# Patient Record
Sex: FEMALE | Race: WHITE | NOT HISPANIC OR LATINO | Employment: FULL TIME | URBAN - METROPOLITAN AREA
[De-identification: names, ages, dates, MRNs, and addresses within clinical notes are randomized per-mention and may not be internally consistent; named-entity substitution may affect disease eponyms.]

---

## 2017-06-27 ENCOUNTER — HOSPITAL ENCOUNTER (OUTPATIENT)
Dept: RADIOLOGY | Facility: MEDICAL CENTER | Age: 47
End: 2017-06-27
Attending: NURSE PRACTITIONER
Payer: MEDICAID

## 2017-06-27 DIAGNOSIS — Z12.31 VISIT FOR SCREENING MAMMOGRAM: ICD-10-CM

## 2017-06-27 PROCEDURE — 77063 BREAST TOMOSYNTHESIS BI: CPT

## 2017-08-09 ENCOUNTER — OFFICE VISIT (OUTPATIENT)
Dept: MEDICAL GROUP | Facility: CLINIC | Age: 47
End: 2017-08-09
Payer: MEDICAID

## 2017-08-09 VITALS
RESPIRATION RATE: 16 BRPM | TEMPERATURE: 97.7 F | HEIGHT: 65 IN | OXYGEN SATURATION: 96 % | WEIGHT: 256 LBS | DIASTOLIC BLOOD PRESSURE: 78 MMHG | HEART RATE: 76 BPM | BODY MASS INDEX: 42.65 KG/M2 | SYSTOLIC BLOOD PRESSURE: 114 MMHG

## 2017-08-09 DIAGNOSIS — R53.83 LETHARGIC: ICD-10-CM

## 2017-08-09 DIAGNOSIS — G47.00 INSOMNIA, UNSPECIFIED TYPE: ICD-10-CM

## 2017-08-09 DIAGNOSIS — Z23 NEED FOR VACCINATION: ICD-10-CM

## 2017-08-09 DIAGNOSIS — Z13.6 SCREENING FOR CARDIOVASCULAR CONDITION: ICD-10-CM

## 2017-08-09 DIAGNOSIS — R19.7 DIARRHEA IN ADULT PATIENT: ICD-10-CM

## 2017-08-09 DIAGNOSIS — F41.9 ANXIETY: ICD-10-CM

## 2017-08-09 PROCEDURE — 90715 TDAP VACCINE 7 YRS/> IM: CPT | Performed by: PHYSICIAN ASSISTANT

## 2017-08-09 PROCEDURE — 90471 IMMUNIZATION ADMIN: CPT | Performed by: PHYSICIAN ASSISTANT

## 2017-08-09 PROCEDURE — 99213 OFFICE O/P EST LOW 20 MIN: CPT | Mod: 25 | Performed by: PHYSICIAN ASSISTANT

## 2017-08-09 RX ORDER — HYDROXYZINE HYDROCHLORIDE 25 MG/1
25 TABLET, FILM COATED ORAL 3 TIMES DAILY PRN
Qty: 90 TAB | Refills: 0 | Status: SHIPPED | OUTPATIENT
Start: 2017-08-09 | End: 2017-09-11 | Stop reason: SDUPTHER

## 2017-08-09 ASSESSMENT — PAIN SCALES - GENERAL: PAINLEVEL: NO PAIN

## 2017-08-09 NOTE — ASSESSMENT & PLAN NOTE
The pateint states that she hasn't slept well in a very long time. She sates that she has insomnia, often not falling asleep until 1am after laying for hours. She states that she frequently wakes up in the evening and some times ruffin not fall back asleep. She states her boyfriend would tell her if she snored but she doesn't think she snores. She drinks 3 cups of coffee every morning.

## 2017-08-09 NOTE — ASSESSMENT & PLAN NOTE
Since her gallbladder removal about 1 year ago, the patient states she has had intermittent diarrhea with no apparent particular food correlation. She feels it may be correlated to stress but has recently become avoidant of her normal daily activities because she is afraid she will need to use the restroom and it will be locked.

## 2017-08-09 NOTE — ASSESSMENT & PLAN NOTE
Patient is unsure why but she has been extremely lethargic as of late. She states that she will come home from work and fall directly asleep because she is so tired.

## 2017-08-09 NOTE — MR AVS SNAPSHOT
"Lori Mendes   2017 11:20 AM   Office Visit   MRN: [id number]    [facility]  [phone]    Description:  Female : 1970   Provider:  Renetta Lozoya PA-C           Reason for Visit     Diarrhea x 1 year - diet changes - nothing works    Immunizations Tdap      Allergies as of 2017     Allergen Noted Reactions    Morphine 2015         You were diagnosed with     Screening for cardiovascular condition   [430174]       Lethargic   [382880]       Insomnia, unspecified type   [8359378]       Diarrhea in adult patient   [846977]       Anxiety   [131256]       Need for vaccination   [469784]         Vital Signs     Blood Pressure Pulse Temperature Respirations Height Weight    114/78 mmHg 76 36.5 °C (97.7 °F) 16 1.651 m (5' 5\") 116.121 kg (256 lb)    Body Mass Index Oxygen Saturation Smoking Status             42.60 kg/m2 96% Never Smoker          Basic Information     Date Of Birth Sex Race Ethnicity Preferred Language    1970 Female White Non- English      Your appointments     Sep 20, 2017  9:00 AM   Established Patient with Renetta Lozoya PA-C   [facility] (--)    [address] [address] [phone]           You will be receiving a confirmation call a few days before your appointment from our automated call confirmation system.            Pablo 15, 2018 11:20 AM   Telemedicine Clinic New Patient with A Rotation, [facility], [facility] [place]   Centralized Scheduling (--)    [address]  [place] [id number] [phone]              Problem List              ICD-10-CM Priority Class Noted - Resolved    Anxiety disorder (Chronic) F41.9   2012 - Present    Vitamin D deficiency E55.9   2012 - Present    Lumbar radiculitis M54.16   2012 - Present    Obesity (BMI 30-39.9) E66.9   2013 - Present    Other screening mammogram Z12.31   " 3/20/2013 - Present    Seasonal allergies J30.2   3/20/2013 - Present    Epigastric abdominal pain R10.13   10/8/2014 - Present    Acute cholecystitis K81.0   10/9/2014 - Present    Liver enzyme elevation R74.8   10/10/2014 - Present    Depression F32.9   9/1/2015 - Present    Absolute anemia D64.9   1/8/2016 - Present    Left ear pain H92.02   9/30/2016 - Present    Insomnia G47.00   8/9/2017 - Present    Lethargic R53.83   8/9/2017 - Present    Diarrhea in adult patient R19.7   8/9/2017 - Present      Health Maintenance        Date Due Completion Dates    PAP SMEAR 9/27/2015 9/27/2012    IMM INFLUENZA (1) 9/1/2017 ---    MAMMOGRAM 6/27/2018 6/27/2017, 3/23/2016, 10/27/2014, 8/28/2013    IMM DTaP/Tdap/Td Vaccine (2 - Td) 8/9/2027 8/9/2017            Current Immunizations     Tdap Vaccine 8/9/2017      Below and/or attached are the medications your provider expects you to take. Review all of your home medications and newly ordered medications with your provider and/or pharmacist. Follow medication instructions as directed by your provider and/or pharmacist. Please keep your medication list with you and share with your provider. Update the information when medications are discontinued, doses are changed, or new medications (including over-the-counter products) are added; and carry medication information at all times in the event of emergency situations     Allergies:  MORPHINE - (reactions not documented)               Medications  Valid as of: August 09, 2017 -  3:07 PM    Generic Name Brand Name Tablet Size Instructions for use    Cholecalciferol (Cap) VITAMIN D3 5000 UNIT Take 1 Cap by mouth every day.        Citalopram Hydrobromide (Tab) CELEXA 40 MG Take 1 Tab by mouth every day.        DiphenhydrAMINE HCl   Take  by mouth.        Gabapentin (Cap) NEURONTIN 300 MG Take 2 Caps by mouth 2 Times a Day.        HydrOXYzine HCl (Tab) ATARAX 25 MG Take 1 Tab by mouth 3 times a day as needed for Anxiety.         Norgestimate-Eth Estradiol (Tab) ORTHO-CYCLEN 0.25-35 MG-MCG Take 1 Tab by mouth every day.        .                 Medicines prescribed today were sent to:     [facility] [place] - [address]    [address] [place] [address]    Phone: [phone] Fax: [phone]    Open 24 Hours?: No    [id number] - [place], [place] - [address]    [address] [place] [address]    Phone: [phone] Fax: [phone]    Open 24 Hours?: No      Medication refill instructions:       If your prescription bottle indicates you have medication refills left, it is not necessary to call your provider’s office. Please contact your pharmacy and they will refill your medication.    If your prescription bottle indicates you do not have any refills left, you may request refills at any time through one of the following ways: The online Urvew system (except Urgent Care), by calling your provider’s office, or by asking your pharmacy to contact your provider’s office with a refill request. Medication refills are processed only during regular business hours and may not be available until the next business day. Your provider may request additional information or to have a follow-up visit with you prior to refilling your medication.   *Please Note: Medication refills are assigned a new Rx number when refilled electronically. Your pharmacy may indicate that no refills were authorized even though a new prescription for the same medication is available at the pharmacy. Please request the medicine by name with the pharmacy before contacting your provider for a refill.        Your To Do List     Future Labs/Procedures Complete By Expires    CBC WITHOUT DIFFERENTIAL  As directed 2/9/2018    TSH WITH REFLEX TO FT4  As directed 8/9/2018      Referral     A referral request has been sent to our patient care coordination department. Please allow 3-5 business days for us to  process this request and contact you either by phone or mail. If you do not hear from us by the 5th business day, please call us at [phone].           [employer] Data Access Code: Activation code not generated  Current Cequel Data Status: Active

## 2017-08-09 NOTE — PROGRESS NOTES
Chief Complaint   Patient presents with   • Diarrhea     x 1 year - diet changes - nothing works   • Immunizations     Tdap       HISTORY OF PRESENT ILLNESS: Patient is a 47 y.o. female established patient who presents today for evaluation and management of:    Diarrhea in adult patient  Since her gallbladder removal about 1 year ago, the patient states she has had intermittent diarrhea with no apparent particular food correlation. She feels it may be correlated to stress but has recently become avoidant of her normal daily activities because she is afraid she will need to use the restroom and it will be locked.     Insomnia  The pateint states that she hasn't slept well in a very long time. She sates that she has insomnia, often not falling asleep until 1am after laying for hours. She states that she frequently wakes up in the evening and some times ruffin not fall back asleep. She states her boyfriend would tell her if she snored but she doesn't think she snores. She drinks 3 cups of coffee every morning.     Lethargic  Patient is unsure why but she has been extremely lethargic as of late. She states that she will come home from work and fall directly asleep because she is so tired.        Patient Active Problem List    Diagnosis Date Noted   • Insomnia 08/09/2017   • Lethargic 08/09/2017   • Diarrhea in adult patient 08/09/2017   • Left ear pain 09/30/2016   • Absolute anemia 01/08/2016   • Depression 09/01/2015   • Liver enzyme elevation 10/10/2014   • Acute cholecystitis 10/09/2014   • Epigastric abdominal pain 10/08/2014   • Other screening mammogram 03/20/2013   • Seasonal allergies 03/20/2013   • Obesity (BMI 30-39.9) 01/04/2013   • Vitamin D deficiency 02/07/2012   • Lumbar radiculitis 02/07/2012   • Anxiety disorder 01/09/2012       Allergies:Morphine    Current Outpatient Prescriptions   Medication Sig Dispense Refill   • hydrOXYzine (ATARAX) 25 MG Tab Take 1 Tab by mouth 3 times a day as needed for Anxiety.  90 Tab 0   • citalopram (CELEXA) 40 MG Tab Take 1 Tab by mouth every day. 30 Tab 11   • gabapentin (NEURONTIN) 300 MG Cap Take 2 Caps by mouth 2 Times a Day. 120 Cap 11   • cholecalciferol (VITAMIN D3) 5000 UNIT Cap Take 1 Cap by mouth every day. 30 Cap 5   • DiphenhydrAMINE HCl (ALLERGY MED PO) Take  by mouth.     • norgestimate-ethinyl estradiol (ORTHO-CYCLEN) 0.25-35 MG-MCG per tablet Take 1 Tab by mouth every day. 28 Tab 11     No current facility-administered medications for this visit.       Social History   Substance Use Topics   • Smoking status: Never Smoker    • Smokeless tobacco: Former User     Types: Chew     Quit date: 01/02/2000      Comment: unable to quantify chewing   • Alcohol Use: No       Family Status   Relation Status Death Age   • Mother Alive      64 in 2011   • Father Alive      65 in 2011     Family History   Problem Relation Age of Onset   • Hypertension Father    • Cancer Maternal Grandmother 50     breast cancer, cervical cancer and lung cancer   • Cancer Paternal Grandmother 60     breast cancer       Review of Systems:   Constitutional: Negative for fever, chills, weight loss and malaise.   HENT: Negative for ear pain, nosebleeds, congestion, sore throat and neck pain.    Eyes: Negative for blurred vision.   Respiratory: Negative for cough, sputum production, shortness of breath and wheezing.    Cardiovascular: Negative for chest pain, palpitations, orthopnea and leg swelling.   Gastrointestinal: Positive for occasional nausea. Negative for heartburn, vomiting and abdominal pain.   Genitourinary: Negative for dysuria, urgency and frequency.   Musculoskeletal: Negative for myalgias, back pain and joint pain.   Skin: Negative for rash and itching.   Neurological: Negative for dizziness, tingling, tremors, sensory change, focal weakness and headaches.   Endo/Heme/Allergies: Does not bruise/bleed easily.   Psychiatric/Behavioral: Negative for depression, suicidal ideas and memory loss.   "The patient is frequently nervous/anxious and does have insomnia.      Exam:  Blood pressure 114/78, pulse 76, temperature 36.5 °C (97.7 °F), resp. rate 16, height 1.651 m (5' 5\"), weight 116.121 kg (256 lb), SpO2 96 %.  Body mass index is 42.6 kg/(m^2).  General:  Obese female in NAD  Head: is grossly normal.  Neck: Supple without masses. Thyroid is not visibly enlarged.  Pulmonary: Clear to ausculation. Normal effort. No rales, ronchi, or wheezing.  Cardiovascular: Regular rate and rhythm without murmur. Carotid and radial pulses are intact and equal bilaterally.  Extremities: no clubbing, cyanosis, or edema.    Medical decision-making and discussion:  1. Screening for cardiovascular condition  - LIPID PANEL    2. Lethargic  - LIPID PANEL  - TSH WITH REFLEX TO FT4; Future  - CMP (12)  - CBC WITHOUT DIFFERENTIAL; Future  - REFERRAL TO SLEEP STUDIES    3. Insomnia, unspecified type  Patient advised regarding sleep hygiene. The patient should turn off all screens including cell phones, television and computer screens approximately 1 hour before the time they intend to fall asleep, instead reading books or magazines. They should stop drinking caffeine 4-6 hours before they intend to fall asleep and should be consuming no more than 1-2 small servings of caffeine daily. They should increase daily exercise especially in the morning but should limit exercise close to bed time. If they have problems with overnight waking to urinate they should consume their daily water intake in the early part of the day, having their last glass of fluids approximately 1-2 hours before they intend to fall asleep.   - REFERRAL TO SLEEP STUDIES    4. Diarrhea in adult patient  Patient advised to consume constipating foods as she is able and to cut out her daily probiotic pill for now to determine if this may be the cause.   - CLOSTRIDIUM DIFFICILE CULTURE    5. Anxiety  Advised to complete a 4 week gradual taper off of celexa including " taking 1/2 dose daily for 2 weeks then, 1/2 dose every other day for 2 weeks. Advised to continuously check in and advised her family that she is doing this so that if she or anyone notices she is becoming extra grumpy or anxious or sad that she may have actually have been helped by her celexa.   Advised to use atarax for rescue anxiety attacks and, if needed she can take 1-2 pills QAM.   - hydrOXYzine (ATARAX) 25 MG Tab; Take 1 Tab by mouth 3 times a day as needed for Anxiety.  Dispense: 90 Tab; Refill: 0    6. Need for vaccination  - Tdap =>8yo IM      Return in about 6 weeks (around 9/20/2017) for anxiety medication follow up, florian female annual appt.

## 2017-09-11 DIAGNOSIS — F41.9 ANXIETY: ICD-10-CM

## 2017-09-12 RX ORDER — HYDROXYZINE HYDROCHLORIDE 25 MG/1
25 TABLET, FILM COATED ORAL 3 TIMES DAILY PRN
Qty: 90 TAB | Refills: 0 | Status: SHIPPED | OUTPATIENT
Start: 2017-09-12 | End: 2017-09-20 | Stop reason: SDUPTHER

## 2017-09-12 NOTE — TELEPHONE ENCOUNTER
From: Lori Mendes  Sent: 9/11/2017 12:52 PM PDT  Subject: Medication Renewal Request    Lori Mendes would like a refill of the following medications:  hydrOXYzine (ATARAX) 25 MG Tab [Renetta Lozoya, P.A.-C.]    Preferred pharmacy: [facility] - [place], [place] - [address]    Comment:  Medication is working very well. Need to refill ASAP, I do not have enough to make it to my next appointment Sept. 20th.

## 2017-09-14 ENCOUNTER — HOSPITAL ENCOUNTER (OUTPATIENT)
Facility: MEDICAL CENTER | Age: 47
End: 2017-09-14
Attending: PHYSICIAN ASSISTANT
Payer: MEDICAID

## 2017-09-14 PROCEDURE — 87493 C DIFF AMPLIFIED PROBE: CPT

## 2017-09-15 ENCOUNTER — NON-PROVIDER VISIT (OUTPATIENT)
Dept: MEDICAL GROUP | Facility: CLINIC | Age: 47
End: 2017-09-15
Payer: MEDICAID

## 2017-09-15 ENCOUNTER — HOSPITAL ENCOUNTER (OUTPATIENT)
Facility: MEDICAL CENTER | Age: 47
End: 2017-09-15
Attending: NURSE PRACTITIONER
Payer: MEDICAID

## 2017-09-15 ENCOUNTER — HOSPITAL ENCOUNTER (OUTPATIENT)
Facility: MEDICAL CENTER | Age: 47
End: 2017-09-15
Attending: PHYSICIAN ASSISTANT
Payer: MEDICAID

## 2017-09-15 DIAGNOSIS — F32.A DEPRESSION, UNSPECIFIED DEPRESSION TYPE: ICD-10-CM

## 2017-09-15 DIAGNOSIS — R53.83 LETHARGIC: ICD-10-CM

## 2017-09-15 DIAGNOSIS — Z01.89 ROUTINE LAB DRAW: ICD-10-CM

## 2017-09-15 LAB
AMBIGUOUS DTTM AMBI4: NORMAL
C DIFF DNA SPEC QL NAA+PROBE: NEGATIVE
C DIFF TOX GENS STL QL NAA+PROBE: NEGATIVE
SIGNIFICANT IND 70042: NORMAL
SOURCE SOURCE: NORMAL

## 2017-09-15 PROCEDURE — 85027 COMPLETE CBC AUTOMATED: CPT

## 2017-09-15 PROCEDURE — 80053 COMPREHEN METABOLIC PANEL: CPT | Mod: 91

## 2017-09-15 PROCEDURE — 82306 VITAMIN D 25 HYDROXY: CPT

## 2017-09-15 PROCEDURE — 36415 COLL VENOUS BLD VENIPUNCTURE: CPT | Performed by: NURSE PRACTITIONER

## 2017-09-15 PROCEDURE — 80061 LIPID PANEL: CPT

## 2017-09-15 PROCEDURE — 80053 COMPREHEN METABOLIC PANEL: CPT

## 2017-09-15 PROCEDURE — 84443 ASSAY THYROID STIM HORMONE: CPT

## 2017-09-15 PROCEDURE — 85025 COMPLETE CBC W/AUTO DIFF WBC: CPT

## 2017-09-15 PROCEDURE — 84439 ASSAY OF FREE THYROXINE: CPT

## 2017-09-15 PROCEDURE — 99000 SPECIMEN HANDLING OFFICE-LAB: CPT | Performed by: NURSE PRACTITIONER

## 2017-09-16 LAB
25(OH)D3 SERPL-MCNC: 27 NG/ML (ref 30–100)
ALBUMIN SERPL BCP-MCNC: 3.8 G/DL (ref 3.2–4.9)
ALBUMIN SERPL BCP-MCNC: 3.9 G/DL (ref 3.2–4.9)
ALBUMIN/GLOB SERPL: 1.2 G/DL
ALBUMIN/GLOB SERPL: 1.4 G/DL
ALP SERPL-CCNC: 50 U/L (ref 30–99)
ALP SERPL-CCNC: 52 U/L (ref 30–99)
ALT SERPL-CCNC: 13 U/L (ref 2–50)
ALT SERPL-CCNC: 15 U/L (ref 2–50)
ANION GAP SERPL CALC-SCNC: 9 MMOL/L (ref 0–11.9)
ANION GAP SERPL CALC-SCNC: 9 MMOL/L (ref 0–11.9)
AST SERPL-CCNC: 12 U/L (ref 12–45)
AST SERPL-CCNC: 17 U/L (ref 12–45)
BASOPHILS # BLD AUTO: 0.7 % (ref 0–1.8)
BASOPHILS # BLD: 0.07 K/UL (ref 0–0.12)
BILIRUB SERPL-MCNC: 0.4 MG/DL (ref 0.1–1.5)
BILIRUB SERPL-MCNC: 0.4 MG/DL (ref 0.1–1.5)
BUN SERPL-MCNC: 14 MG/DL (ref 8–22)
BUN SERPL-MCNC: 15 MG/DL (ref 8–22)
CALCIUM SERPL-MCNC: 9.6 MG/DL (ref 8.5–10.5)
CALCIUM SERPL-MCNC: 9.6 MG/DL (ref 8.5–10.5)
CHLORIDE SERPL-SCNC: 104 MMOL/L (ref 96–112)
CHLORIDE SERPL-SCNC: 106 MMOL/L (ref 96–112)
CHOLEST SERPL-MCNC: 202 MG/DL (ref 100–199)
CO2 SERPL-SCNC: 24 MMOL/L (ref 20–33)
CO2 SERPL-SCNC: 24 MMOL/L (ref 20–33)
CREAT SERPL-MCNC: 0.59 MG/DL (ref 0.5–1.4)
CREAT SERPL-MCNC: 0.59 MG/DL (ref 0.5–1.4)
EOSINOPHIL # BLD AUTO: 0.17 K/UL (ref 0–0.51)
EOSINOPHIL NFR BLD: 1.8 % (ref 0–6.9)
ERYTHROCYTE [DISTWIDTH] IN BLOOD BY AUTOMATED COUNT: 48 FL (ref 35.9–50)
ERYTHROCYTE [DISTWIDTH] IN BLOOD BY AUTOMATED COUNT: 49 FL (ref 35.9–50)
GFR SERPL CREATININE-BSD FRML MDRD: >60 ML/MIN/1.73 M 2
GFR SERPL CREATININE-BSD FRML MDRD: >60 ML/MIN/1.73 M 2
GLOBULIN SER CALC-MCNC: 2.8 G/DL (ref 1.9–3.5)
GLOBULIN SER CALC-MCNC: 3.3 G/DL (ref 1.9–3.5)
GLUCOSE SERPL-MCNC: 77 MG/DL (ref 65–99)
GLUCOSE SERPL-MCNC: 88 MG/DL (ref 65–99)
HCT VFR BLD AUTO: 41.6 % (ref 37–47)
HCT VFR BLD AUTO: 42.2 % (ref 37–47)
HDLC SERPL-MCNC: 98 MG/DL
HGB BLD-MCNC: 13.4 G/DL (ref 12–16)
HGB BLD-MCNC: 13.5 G/DL (ref 12–16)
IMM GRANULOCYTES # BLD AUTO: 0.07 K/UL (ref 0–0.11)
IMM GRANULOCYTES NFR BLD AUTO: 0.7 % (ref 0–0.9)
LDLC SERPL CALC-MCNC: 83 MG/DL
LYMPHOCYTES # BLD AUTO: 3.44 K/UL (ref 1–4.8)
LYMPHOCYTES NFR BLD: 36.4 % (ref 22–41)
MCH RBC QN AUTO: 29.7 PG (ref 27–33)
MCH RBC QN AUTO: 30.3 PG (ref 27–33)
MCHC RBC AUTO-ENTMCNC: 31.8 G/DL (ref 33.6–35)
MCHC RBC AUTO-ENTMCNC: 32.5 G/DL (ref 33.6–35)
MCV RBC AUTO: 93.3 FL (ref 81.4–97.8)
MCV RBC AUTO: 93.6 FL (ref 81.4–97.8)
MONOCYTES # BLD AUTO: 0.52 K/UL (ref 0–0.85)
MONOCYTES NFR BLD AUTO: 5.5 % (ref 0–13.4)
NEUTROPHILS # BLD AUTO: 5.19 K/UL (ref 2–7.15)
NEUTROPHILS NFR BLD: 54.9 % (ref 44–72)
NRBC # BLD AUTO: 0 K/UL
NRBC BLD AUTO-RTO: 0 /100 WBC
PLATELET # BLD AUTO: 311 K/UL (ref 164–446)
PLATELET # BLD AUTO: 324 K/UL (ref 164–446)
PMV BLD AUTO: 11.6 FL (ref 9–12.9)
PMV BLD AUTO: 11.7 FL (ref 9–12.9)
POTASSIUM SERPL-SCNC: 4.6 MMOL/L (ref 3.6–5.5)
POTASSIUM SERPL-SCNC: 5 MMOL/L (ref 3.6–5.5)
PROT SERPL-MCNC: 6.6 G/DL (ref 6–8.2)
PROT SERPL-MCNC: 7.2 G/DL (ref 6–8.2)
RBC # BLD AUTO: 4.46 M/UL (ref 4.2–5.4)
RBC # BLD AUTO: 4.51 M/UL (ref 4.2–5.4)
SODIUM SERPL-SCNC: 137 MMOL/L (ref 135–145)
SODIUM SERPL-SCNC: 139 MMOL/L (ref 135–145)
T4 FREE SERPL-MCNC: 0.77 NG/DL (ref 0.53–1.43)
TRIGL SERPL-MCNC: 105 MG/DL (ref 0–149)
TSH SERPL DL<=0.005 MIU/L-ACNC: 2.58 UIU/ML (ref 0.3–3.7)
WBC # BLD AUTO: 9.5 K/UL (ref 4.8–10.8)
WBC # BLD AUTO: 9.6 K/UL (ref 4.8–10.8)

## 2017-09-20 ENCOUNTER — OFFICE VISIT (OUTPATIENT)
Dept: MEDICAL GROUP | Facility: CLINIC | Age: 47
End: 2017-09-20
Payer: MEDICAID

## 2017-09-20 VITALS
DIASTOLIC BLOOD PRESSURE: 68 MMHG | WEIGHT: 261 LBS | HEART RATE: 92 BPM | BODY MASS INDEX: 43.49 KG/M2 | OXYGEN SATURATION: 95 % | SYSTOLIC BLOOD PRESSURE: 128 MMHG | TEMPERATURE: 97.1 F | HEIGHT: 65 IN | RESPIRATION RATE: 16 BRPM

## 2017-09-20 DIAGNOSIS — Z23 NEED FOR VACCINATION: ICD-10-CM

## 2017-09-20 DIAGNOSIS — M54.16 LUMBAR RADICULITIS: ICD-10-CM

## 2017-09-20 DIAGNOSIS — Z30.41 ENCOUNTER FOR BIRTH CONTROL PILLS MAINTENANCE: ICD-10-CM

## 2017-09-20 DIAGNOSIS — F41.9 ANXIETY: ICD-10-CM

## 2017-09-20 DIAGNOSIS — E55.9 VITAMIN D DEFICIENCY: ICD-10-CM

## 2017-09-20 DIAGNOSIS — R19.7 DIARRHEA IN ADULT PATIENT: ICD-10-CM

## 2017-09-20 DIAGNOSIS — F32.A DEPRESSION, UNSPECIFIED DEPRESSION TYPE: ICD-10-CM

## 2017-09-20 DIAGNOSIS — E66.01 MORBID OBESITY WITH BMI OF 40.0-44.9, ADULT (HCC): ICD-10-CM

## 2017-09-20 PROCEDURE — 99214 OFFICE O/P EST MOD 30 MIN: CPT | Mod: 25 | Performed by: PHYSICIAN ASSISTANT

## 2017-09-20 PROCEDURE — 90686 IIV4 VACC NO PRSV 0.5 ML IM: CPT | Performed by: PHYSICIAN ASSISTANT

## 2017-09-20 PROCEDURE — 90471 IMMUNIZATION ADMIN: CPT | Performed by: PHYSICIAN ASSISTANT

## 2017-09-20 RX ORDER — HYDROXYZINE HYDROCHLORIDE 25 MG/1
25 TABLET, FILM COATED ORAL 3 TIMES DAILY PRN
Qty: 90 TAB | Refills: 11 | Status: SHIPPED | OUTPATIENT
Start: 2017-09-20 | End: 2018-04-26

## 2017-09-20 RX ORDER — NORGESTIMATE AND ETHINYL ESTRADIOL 0.25-0.035
1 KIT ORAL DAILY
Qty: 28 TAB | Refills: 11 | Status: SHIPPED | OUTPATIENT
Start: 2017-09-20 | End: 2018-03-12 | Stop reason: SDUPTHER

## 2017-09-20 RX ORDER — CITALOPRAM 40 MG/1
40 TABLET ORAL DAILY
Qty: 30 TAB | Refills: 11 | Status: SHIPPED | OUTPATIENT
Start: 2017-09-20 | End: 2017-10-17 | Stop reason: SDUPTHER

## 2017-09-20 RX ORDER — GABAPENTIN 300 MG/1
600 CAPSULE ORAL 2 TIMES DAILY
Qty: 120 CAP | Refills: 11 | Status: SHIPPED | OUTPATIENT
Start: 2017-09-20 | End: 2017-12-14 | Stop reason: SDUPTHER

## 2017-09-20 ASSESSMENT — PATIENT HEALTH QUESTIONNAIRE - PHQ9: CLINICAL INTERPRETATION OF PHQ2 SCORE: 0

## 2017-09-20 NOTE — PROGRESS NOTES
Chief Complaint   Patient presents with   • Labs Only       HISTORY OF PRESENT ILLNESS: Patient is a 47 y.o. female established patient who presents today for evaluation and management of:    Depression  Patient had been taking celexa 40 mg once per day. She stated that she wanted to stop taking this medication and so weaned herself off slowly. She found at this time that she became very aggressive with her boyfriend and so started taking her 40 mg Celexa dose every day. Her symptoms have since dissipated.    Diarrhea in adult patient  Since starting Atarax twice per day, patient states that her diarrhea has been alleviated.    Lumbar radiculitis  Well-controlled with twice a day 300 mg gabapentin. Patient is not having any adverse side effects at this time.    Morbid obesity with BMI of 40.0-44.9, adult ([facility])  Patient has been exercising approximately 5 days per week in an effort to reduce her weight..     Vitamin D deficiency  Patient continues to take over-the-counter vitamin D supplements 5000 units per day.       Patient Active Problem List    Diagnosis Date Noted   • Morbid obesity with BMI of 40.0-44.9, adult ([facility]) 09/20/2017   • Insomnia 08/09/2017   • Lethargic 08/09/2017   • Diarrhea in adult patient 08/09/2017   • Left ear pain 09/30/2016   • Depression 09/01/2015   • Acute cholecystitis 10/09/2014   • Other screening mammogram 03/20/2013   • Seasonal allergies 03/20/2013   • Obesity (BMI 30-39.9) 01/04/2013   • Vitamin D deficiency 02/07/2012   • Lumbar radiculitis 02/07/2012   • Anxiety disorder 01/09/2012       Allergies:Morphine    Current Outpatient Prescriptions   Medication Sig Dispense Refill   • citalopram (CELEXA) 40 MG Tab Take 1 Tab by mouth every day. 30 Tab 11   • hydrOXYzine (ATARAX) 25 MG Tab Take 1 Tab by mouth 3 times a day as needed for Anxiety. 90 Tab 11   • norgestimate-ethinyl estradiol (ORTHO-CYCLEN) 0.25-35 MG-MCG per tablet Take 1 Tab by mouth every day. 28 Tab 11   • gabapentin  (NEURONTIN) 300 MG Cap Take 2 Caps by mouth 2 Times a Day. 120 Cap 11   • cholecalciferol (VITAMIN D3) 5000 UNIT Cap Take 1 Cap by mouth every day. 30 Cap 5   • DiphenhydrAMINE HCl (ALLERGY MED PO) Take  by mouth.       No current facility-administered medications for this visit.        Social History   Substance Use Topics   • Smoking status: Never Smoker   • Smokeless tobacco: Former User     Types: Chew     Quit date: 1/2/2000      Comment: unable to quantify chewing   • Alcohol use No       Family Status   Relation Status   • Mother Alive    64 in 2011   • Father Alive    65 in 2011     Family History   Problem Relation Age of Onset   • Hypertension Father    • Cancer Maternal Grandmother 50     breast cancer, cervical cancer and lung cancer   • Cancer Paternal Grandmother 60     breast cancer       Review of Systems:   Constitutional: Negative for fever, chills, weight loss and malaise.   HENT: Negative for ear pain, nosebleeds, sore throat and neck pain.    Eyes: Negative for blurred vision.   Respiratory: Negative for cough, sputum production, shortness of breath and wheezing.    Cardiovascular: Negative for chest pain, palpitations, orthopnea and leg swelling.   Gastrointestinal: See history of present illness above. Negative for heartburn, nausea, vomiting and abdominal pain.   Genitourinary: Negative for dysuria, urgency and frequency.   Musculoskeletal: Negative for myalgias, back pain and joint pain.   Skin: Negative for rash and itching.   Neurological: See history of present illness above. Negative for dizziness, tingling, tremors, focal weakness and headaches.   Endo/Heme/Allergies: Does not bruise/bleed easily.   Psychiatric/Behavioral: See history of present illness above. Negative for suicidal ideas and memory loss.  The patient is occasionally nervous/anxious and does not have insomnia.       Exam:  Blood pressure 128/68, pulse 92, temperature 36.2 °C (97.1 °F), resp. rate 16, height 1.651 m (5'  "5\"), weight 118.4 kg (261 lb), SpO2 95 %.  Body mass index is 43.43 kg/m².  General:  Obese female in NAD  Head: is grossly normal.  Neck: Supple without masses. Thyroid is not visibly enlarged.  Pulmonary: Normal effort.   Cardiovascular:Carotid and radial pulses are intact and equal bilaterally.  Extremities: no clubbing, cyanosis, or edema.    Medical decision-making and discussion:  1. Depression, unspecified depression type  Counseling offered and refused.  - citalopram (CELEXA) 40 MG Tab; Take 1 Tab by mouth every day.  Dispense: 30 Tab; Refill: 11    2. Anxiety  Since this seems to be the cause of the patient's diarrhea, Atarax should be continued twice per day to prevent further abdominal pain and diarrhea.  - hydrOXYzine (ATARAX) 25 MG Tab; Take 1 Tab by mouth 3 times a day as needed for Anxiety.  Dispense: 90 Tab; Refill: 11    3. Lumbar radiculitis  - gabapentin (NEURONTIN) 300 MG Cap; Take 2 Caps by mouth 2 Times a Day.  Dispense: 120 Cap; Refill: 11    4. Need for vaccination  - Flu Quad Inj >3 Year Pre-Filled PF    5. Diarrhea in adult patient  See #2 above.    6. Encounter for birth control pills maintenance  Patient is not a smoker and does not high risk for clotting.  - norgestimate-ethinyl estradiol (ORTHO-CYCLEN) 0.25-35 MG-MCG per tablet; Take 1 Tab by mouth every day.  Dispense: 28 Tab; Refill: 11    7. Morbid obesity with BMI of 40.0-44.9, adult ([facility])  - Patient identified as having weight management issue.  Appropriate orders and counseling given.    8. Vitamin D deficiency  Patient will continue taking over-the-counter 5000 units vitamin D3 every day.      Please note that this dictation was created using voice recognition software. I have made every reasonable attempt to correct obvious errors, but I expect that there are errors of grammar and possibly content that I did not discover before finalizing the note.      Return for Female annual.  "

## 2017-09-20 NOTE — ASSESSMENT & PLAN NOTE
Patient had been taking celexa 40 mg once per day. She stated that she wanted to stop taking this medication and so weaned herself off slowly. She found at this time that she became very aggressive with her boyfriend and so started taking her 40 mg Celexa dose every day. Her symptoms have since dissipated.

## 2017-09-20 NOTE — ASSESSMENT & PLAN NOTE
Well-controlled with twice a day 300 mg gabapentin. Patient is not having any adverse side effects at this time.

## 2017-09-21 DIAGNOSIS — Z30.41 ENCOUNTER FOR BIRTH CONTROL PILLS MAINTENANCE: ICD-10-CM

## 2017-09-21 RX ORDER — NORGESTIMATE AND ETHINYL ESTRADIOL 0.25-0.035
1 KIT ORAL DAILY
Qty: 28 TAB | Refills: 11 | OUTPATIENT
Start: 2017-09-21

## 2017-10-17 DIAGNOSIS — F32.A DEPRESSION, UNSPECIFIED DEPRESSION TYPE: ICD-10-CM

## 2017-10-17 RX ORDER — CITALOPRAM 40 MG/1
40 TABLET ORAL DAILY
Qty: 90 TAB | Refills: 0 | Status: SHIPPED | OUTPATIENT
Start: 2017-10-17 | End: 2018-01-11 | Stop reason: SDUPTHER

## 2017-10-17 NOTE — TELEPHONE ENCOUNTER
Was the patient seen in the last year in this department? Yes     Does patient have an active prescription for medications requested? No     Received Request Via: Pharmacy - requesting 90 day supply

## 2017-10-23 ENCOUNTER — OFFICE VISIT (OUTPATIENT)
Dept: MEDICAL GROUP | Facility: CLINIC | Age: 47
End: 2017-10-23
Payer: MEDICAID

## 2017-10-23 VITALS
HEIGHT: 65 IN | WEIGHT: 264 LBS | OXYGEN SATURATION: 94 % | HEART RATE: 98 BPM | BODY MASS INDEX: 43.99 KG/M2 | DIASTOLIC BLOOD PRESSURE: 78 MMHG | TEMPERATURE: 98.6 F | RESPIRATION RATE: 16 BRPM | SYSTOLIC BLOOD PRESSURE: 116 MMHG

## 2017-10-23 DIAGNOSIS — J01.90 ACUTE NON-RECURRENT SINUSITIS, UNSPECIFIED LOCATION: ICD-10-CM

## 2017-10-23 DIAGNOSIS — L03.211 CELLULITIS OF FACE: ICD-10-CM

## 2017-10-23 DIAGNOSIS — H69.93 EUSTACHIAN TUBE DYSFUNCTION, BILATERAL: ICD-10-CM

## 2017-10-23 PROCEDURE — 99212 OFFICE O/P EST SF 10 MIN: CPT | Performed by: PHYSICIAN ASSISTANT

## 2017-10-23 RX ORDER — METHYLPREDNISOLONE 4 MG/1
TABLET ORAL
Qty: 21 TAB | Refills: 0 | Status: SHIPPED | OUTPATIENT
Start: 2017-10-23 | End: 2018-03-26

## 2017-10-23 RX ORDER — AMOXICILLIN AND CLAVULANATE POTASSIUM 875; 125 MG/1; MG/1
1 TABLET, FILM COATED ORAL 2 TIMES DAILY
Qty: 20 TAB | Refills: 0 | Status: SHIPPED | OUTPATIENT
Start: 2017-10-23 | End: 2017-11-02

## 2017-10-23 NOTE — PROGRESS NOTES
"Chief Complaint   Patient presents with   • Sinus Problem       HPI: 7 days of illness including: nasal congestion, clear/whitish rhinorrhea, conjunctivitis, ear pain/congestion, facial pain, bilateral, facial pressure, bilateral, fever greater than 101, myalgias and a very red painful nose. Sinus pain and pressure: bilateral maxillary, bilateral frontal  Symptoms negative for sore throat, swollen glands, cough, chest pain, hemoptysis, wheezing  Treatments tried: OTC medications and neti pot   Since onset, symptoms are worse   Similarly ill exposures: yes, at work.   Medical history negative for asthma  She  reports that she has never smoked. She quit smokeless tobacco use about 17 years ago. Her smokeless tobacco use included Chew.    ROS  No fever. No productive cough. No skin rashes.  No N/V/D      Blood pressure 116/78, pulse 98, temperature 37 °C (98.6 °F), resp. rate 16, height 1.651 m (5' 5\"), weight 119.7 kg (264 lb), SpO2 94 %.  Patient thought to be at high risk for communicable respiratory infection. Safety precautions taken during this visit:  Patient mask worn: No  Provider mask worn:No     Exam:  Gen: alert, No conversational dyspnea. No audible wheeze, nontoxic.  PERRL, conjunctiva slightly injected. No photophobia. No eye discharge.  Ears: normal pinnae. TM: poor mobility, no fluid line visible  Nares patent with thin mucus. Tip of external nose is very erythematous, hot to touch, shiny, and tense with edema.   Sinuses tender over maxillary / frontal sinuses.  Throat: erythematous injection. No exudate.   Neck: supple, with  no adenopathy in the neck or supraclavicular regions  Lungs:  clear to auscultation  Skin: warm and dry. No rash.    Medical Decision Making/Plan:     A/P  1. Acute non-recurrent sinusitis, unspecified location  MethylPREDNISolone (MEDROL DOSEPAK) 4 MG Tablet Therapy Pack    amoxicillin-clavulanate (AUGMENTIN) 875-125 MG Tab   2. Eustachian tube dysfunction, bilateral  " MethylPREDNISolone (MEDROL DOSEPAK) 4 MG Tablet Therapy Pack    amoxicillin-clavulanate (AUGMENTIN) 875-125 MG Tab   3. Cellulitis of face  amoxicillin-clavulanate (AUGMENTIN) 875-125 MG Tab     Treatments advised today in addition to orders above  include: Nasal decongestant, sinus rinse or nasal saline, OTC cough/cold product of patient's choice PRN, fluids and rest and heat application to sinuses, continue Neti Pot.    Followup for worsening symptoms, difficulty breathing, lack of expected recovery, or should new symptoms or problems arise.

## 2017-10-24 DIAGNOSIS — H69.93 EUSTACHIAN TUBE DYSFUNCTION, BILATERAL: ICD-10-CM

## 2017-10-24 DIAGNOSIS — J01.90 ACUTE NON-RECURRENT SINUSITIS, UNSPECIFIED LOCATION: ICD-10-CM

## 2017-10-24 DIAGNOSIS — L03.211 CELLULITIS OF FACE: ICD-10-CM

## 2017-10-30 RX ORDER — AMOXICILLIN AND CLAVULANATE POTASSIUM 875; 125 MG/1; MG/1
1 TABLET, FILM COATED ORAL 2 TIMES DAILY
Qty: 20 TAB | Refills: 0 | OUTPATIENT
Start: 2017-10-30 | End: 2017-11-09

## 2017-10-30 NOTE — TELEPHONE ENCOUNTER
Please see below message from pt. She is requesting another medication stated amoxicillin does not work and is making her sick.

## 2017-10-30 NOTE — TELEPHONE ENCOUNTER
From: Lori Mendes  Sent: 10/24/2017 10:14 PM PDT  Subject: Medication Renewal Request    Lori Mendes would like a refill of the following medications:  amoxicillin-clavulanate (AUGMENTIN) 875-125 MG Tab [Renetta Lozoya, P.A.-C.]    Preferred pharmacy: [facility] - [place], [place] - [address] [place] [address] MAIN    Comment:  The amoxicillin that was prescribed to me on Monday, October 23, 2107, is making me very sick. I have bad diarrhea and vomiting. Could you please prescribe another medicine? Thank you, Lori Mendes [phone]

## 2017-12-14 DIAGNOSIS — M54.16 LUMBAR RADICULITIS: ICD-10-CM

## 2017-12-14 RX ORDER — GABAPENTIN 300 MG/1
600 CAPSULE ORAL 2 TIMES DAILY
Qty: 360 CAP | Refills: 0 | Status: SHIPPED | OUTPATIENT
Start: 2017-12-14 | End: 2018-03-12 | Stop reason: SDUPTHER

## 2018-01-11 DIAGNOSIS — F32.A DEPRESSION, UNSPECIFIED DEPRESSION TYPE: ICD-10-CM

## 2018-01-15 RX ORDER — CITALOPRAM 40 MG/1
TABLET ORAL
Qty: 90 TAB | Refills: 0 | Status: SHIPPED | OUTPATIENT
Start: 2018-01-15 | End: 2018-03-12 | Stop reason: SDUPTHER

## 2018-01-16 ENCOUNTER — APPOINTMENT (OUTPATIENT)
Dept: SCHEDULING | Facility: IMAGING CENTER | Age: 48
End: 2018-01-16
Payer: COMMERCIAL

## 2018-03-12 DIAGNOSIS — M54.16 LUMBAR RADICULITIS: ICD-10-CM

## 2018-03-12 RX ORDER — GABAPENTIN 300 MG/1
CAPSULE ORAL
Qty: 360 CAP | Refills: 0 | Status: SHIPPED | OUTPATIENT
Start: 2018-03-12 | End: 2018-06-28 | Stop reason: SDUPTHER

## 2018-03-22 ENCOUNTER — NON-PROVIDER VISIT (OUTPATIENT)
Dept: URGENT CARE | Facility: PHYSICIAN GROUP | Age: 48
End: 2018-03-22

## 2018-03-22 ENCOUNTER — OFFICE VISIT (OUTPATIENT)
Dept: URGENT CARE | Facility: PHYSICIAN GROUP | Age: 48
End: 2018-03-22
Payer: MEDICAID

## 2018-03-22 ENCOUNTER — APPOINTMENT (OUTPATIENT)
Dept: RADIOLOGY | Facility: IMAGING CENTER | Age: 48
End: 2018-03-22
Attending: PHYSICIAN ASSISTANT
Payer: MEDICAID

## 2018-03-22 VITALS
SYSTOLIC BLOOD PRESSURE: 110 MMHG | RESPIRATION RATE: 16 BRPM | OXYGEN SATURATION: 99 % | HEART RATE: 84 BPM | BODY MASS INDEX: 44.15 KG/M2 | TEMPERATURE: 99 F | WEIGHT: 265 LBS | HEIGHT: 65 IN | DIASTOLIC BLOOD PRESSURE: 74 MMHG

## 2018-03-22 DIAGNOSIS — R10.84 DIFFUSE ABDOMINAL PAIN: ICD-10-CM

## 2018-03-22 DIAGNOSIS — K62.5 BRBPR (BRIGHT RED BLOOD PER RECTUM): ICD-10-CM

## 2018-03-22 DIAGNOSIS — R19.7 DIARRHEA, UNSPECIFIED TYPE: ICD-10-CM

## 2018-03-22 DIAGNOSIS — R11.0 NAUSEA: ICD-10-CM

## 2018-03-22 DIAGNOSIS — R31.9 HEMATURIA, UNSPECIFIED TYPE: ICD-10-CM

## 2018-03-22 LAB
APPEARANCE UR: CLEAR
BILIRUB UR STRIP-MCNC: NEGATIVE MG/DL
COLOR UR AUTO: YELLOW
GLUCOSE UR STRIP.AUTO-MCNC: NEGATIVE MG/DL
INT CON NEG: NORMAL
INT CON POS: NORMAL
KETONES UR STRIP.AUTO-MCNC: NORMAL MG/DL
LEUKOCYTE ESTERASE UR QL STRIP.AUTO: NEGATIVE
NITRITE UR QL STRIP.AUTO: NEGATIVE
PH UR STRIP.AUTO: 6.5 [PH] (ref 5–8)
POC URINE PREGNANCY TEST: NEGATIVE
PROT UR QL STRIP: NEGATIVE MG/DL
RBC UR QL AUTO: NORMAL
SP GR UR STRIP.AUTO: 1.01
UROBILINOGEN UR STRIP-MCNC: NEGATIVE MG/DL

## 2018-03-22 PROCEDURE — 81025 URINE PREGNANCY TEST: CPT | Performed by: PHYSICIAN ASSISTANT

## 2018-03-22 PROCEDURE — 74019 RADEX ABDOMEN 2 VIEWS: CPT | Performed by: FAMILY MEDICINE

## 2018-03-22 PROCEDURE — 81002 URINALYSIS NONAUTO W/O SCOPE: CPT | Performed by: PHYSICIAN ASSISTANT

## 2018-03-22 PROCEDURE — 99215 OFFICE O/P EST HI 40 MIN: CPT | Mod: 25 | Performed by: PHYSICIAN ASSISTANT

## 2018-03-22 RX ORDER — POLYETHYLENE GLYCOL 3350 17 G/17G
17 POWDER, FOR SOLUTION ORAL DAILY
Qty: 1 BOTTLE | Refills: 0 | Status: SHIPPED | OUTPATIENT
Start: 2018-03-22 | End: 2018-04-26

## 2018-03-22 RX ORDER — ONDANSETRON 4 MG/1
4 TABLET, ORALLY DISINTEGRATING ORAL EVERY 8 HOURS PRN
Qty: 20 TAB | Refills: 0 | Status: SHIPPED | OUTPATIENT
Start: 2018-03-22 | End: 2018-04-26

## 2018-03-22 NOTE — PROGRESS NOTES
"  Chief Complaint   Patient presents with   • Abdominal Pain     \"feel bloated\" Fatigue hx gallbladder removal       HISTORY OF PRESENT ILLNESS: Patient is a 47 y.o. female who presents today for the following:    Abdominal pain x 2 months  Worse x 1 month  Bloating x 3 days  + fatigue, pain across the top of the abdomen, chills  Worse with eating; still has pain even without eating  N/D - diarrhea after eating; 2-3 episodes daily  Denies fevers, recent travel, antibiotics, bad food/drink, sick contacts  Blood on the TP but in the bowl or the stool; denies history of hemorrhoids  BM - intermittent constipation    Patient Active Problem List    Diagnosis Date Noted   • Acute non-recurrent sinusitis 10/23/2017   • Cellulitis of face 10/23/2017   • Morbid obesity with BMI of 40.0-44.9, adult ([facility]) 09/20/2017   • Insomnia 08/09/2017   • Lethargic 08/09/2017   • Diarrhea in adult patient 08/09/2017   • Left ear pain 09/30/2016   • Depression 09/01/2015   • Acute cholecystitis 10/09/2014   • Other screening mammogram 03/20/2013   • Seasonal allergies 03/20/2013   • Obesity (BMI 30-39.9) 01/04/2013   • Vitamin D deficiency 02/07/2012   • Lumbar radiculitis 02/07/2012   • Anxiety disorder 01/09/2012       Allergies:Morphine    Current Outpatient Prescriptions Ordered in [facility]   Medication Sig Dispense Refill   • polyethylene glycol 3350 (MIRALAX) Powder Take 17 g by mouth every day. 1 Bottle 0   • ondansetron (ZOFRAN ODT) 4 MG TABLET DISPERSIBLE Take 1 Tab by mouth every 8 hours as needed for Nausea. 20 Tab 0   • citalopram (CELEXA) 40 MG Tab Take 1 Tab by mouth every day. 30 Tab 0   • gabapentin (NEURONTIN) 300 MG Cap TAKE 2 CAPSULES BY MOUTH TWO TIMES DAILY 360 Cap 0   • norgestimate-ethinyl estradiol (SPRINTEC 28) 0.25-35 MG-MCG per tablet Take 1 Tab by mouth every day. 28 Tab 0   • MethylPREDNISolone (MEDROL DOSEPAK) 4 MG Tablet Therapy Pack As directed on the packaging label. 21 Tab 0   • hydrOXYzine (ATARAX) 25 MG Tab " "Take 1 Tab by mouth 3 times a day as needed for Anxiety. 90 Tab 11   • cholecalciferol (VITAMIN D3) 5000 UNIT Cap Take 1 Cap by mouth every day. 30 Cap 5   • DiphenhydrAMINE HCl (ALLERGY MED PO) Take  by mouth.       No current Epic-ordered facility-administered medications on file.        Past Medical History:   Diagnosis Date   • Anxiety disorder 1/9/2012   • Lumbar radiculitis 2/7/2012   • Obesity (BMI 30-39.9) 1/4/2013   • S/P laparoscopic cholecystectomy 10/9/14       Social History   Substance Use Topics   • Smoking status: Never Smoker   • Smokeless tobacco: Former User     Types: Chew     Quit date: 1/2/2000      Comment: unable to quantify chewing   • Alcohol use No       Family Status   Relation Status   • Mother Alive    64 in 2011   • Father Alive    65 in 2011   • Maternal Grandmother    • Paternal Grandmother      Family History   Problem Relation Age of Onset   • Hypertension Father    • Cancer Maternal Grandmother 50     breast cancer, cervical cancer and lung cancer   • Cancer Paternal Grandmother 60     breast cancer       ROS:    Review of Systems   Constitutional: Negative for fever, chills, weight loss and malaise/fatigue.   HENT: Negative for ear pain, nosebleeds, congestion, sore throat and neck pain.    Eyes: Negative for blurred vision.   Respiratory: Negative for cough, sputum production, shortness of breath and wheezing.    Cardiovascular: Negative for chest pain, palpitations, orthopnea and leg swelling.   Gastrointestinal: Negative for heartburn, nausea, vomiting and abdominal pain.   Genitourinary: Negative for dysuria, urgency and frequency.       Exam:  Blood pressure 110/74, pulse 84, temperature 37.2 °C (99 °F), resp. rate 16, height 1.651 m (5' 5\"), weight 120.2 kg (265 lb), SpO2 99 %.  General: Well developed, well nourished. No distress.  HEENT: Head is grossly normal. Moist mucous membranes.  Pulmonary: Clear to ausculation and percussion.  Normal effort. No rales, ronchi, or " wheezing.   Cardiovascular: Regular rate and rhythm without murmur. No edema.   Back: No CVA tenderness noted  Abdomen: Soft,  nondistended. No hepatosplenomegaly. Diffuse tenderness without guarding or rebound. Bowel sounds within normal limits.  Neurologic: Grossly nonfocal.  Lymph: No cervical lymphadenopathy noted.  Skin: Warm, dry, good turgor. No rashes in visible areas.   Psych: Normal mood. Alert and oriented x3. Judgment and insight is normal.    UA: Large blood, otherwise negative    Urine hcg: negative    2V abdomen, per radiology:  Impression       1.  There is a nonobstructive nonspecific bowel gas pattern.  There is no evidence of free intraperitoneal air.     Assessment/Plan:  Discussed reviewing diet, fluid intake, activity level. Patient to try MiraLAX to see if this helps bloating. Follow-up with primary care provider next week as scheduled. May need to consider GI referral.  1. BRBPR (bright red blood per rectum)     2. Diffuse abdominal pain  POCT Urinalysis    [id number] VIEWS    POCT PREGNANCY    polyethylene glycol 3350 (MIRALAX) Powder   3. Nausea  ondansetron (ZOFRAN ODT) 4 MG TABLET DISPERSIBLE   4. Diarrhea, unspecified type     5. Hematuria, unspecified type

## 2018-03-26 ENCOUNTER — OFFICE VISIT (OUTPATIENT)
Dept: MEDICAL GROUP | Facility: CLINIC | Age: 48
End: 2018-03-26
Payer: MEDICAID

## 2018-03-26 VITALS
OXYGEN SATURATION: 98 % | DIASTOLIC BLOOD PRESSURE: 80 MMHG | BODY MASS INDEX: 43.49 KG/M2 | RESPIRATION RATE: 16 BRPM | TEMPERATURE: 98.9 F | HEIGHT: 65 IN | WEIGHT: 261 LBS | SYSTOLIC BLOOD PRESSURE: 126 MMHG | HEART RATE: 102 BPM

## 2018-03-26 DIAGNOSIS — E66.01 MORBID OBESITY WITH BMI OF 40.0-44.9, ADULT (HCC): ICD-10-CM

## 2018-03-26 DIAGNOSIS — R19.7 DIARRHEA IN ADULT PATIENT: ICD-10-CM

## 2018-03-26 DIAGNOSIS — Z90.49 S/P CHOLECYSTECTOMY: ICD-10-CM

## 2018-03-26 PROBLEM — J01.90 ACUTE NON-RECURRENT SINUSITIS: Status: RESOLVED | Noted: 2017-10-23 | Resolved: 2018-03-26

## 2018-03-26 PROCEDURE — 99213 OFFICE O/P EST LOW 20 MIN: CPT | Performed by: PHYSICIAN ASSISTANT

## 2018-03-26 RX ORDER — CHOLESTYRAMINE 4 G/9G
1 POWDER, FOR SUSPENSION ORAL DAILY
Qty: 41 EACH | Refills: 1 | Status: SHIPPED | OUTPATIENT
Start: 2018-03-26 | End: 2018-04-12 | Stop reason: SDUPTHER

## 2018-03-27 PROBLEM — L03.211 CELLULITIS OF FACE: Status: RESOLVED | Noted: 2017-10-23 | Resolved: 2018-03-27

## 2018-03-27 PROBLEM — R53.83 LETHARGIC: Status: RESOLVED | Noted: 2017-08-09 | Resolved: 2018-03-27

## 2018-03-27 NOTE — ASSESSMENT & PLAN NOTE
"This patient has tried many treatments to alleviate her diarrhea. She most recently tried using hydroxyzine which worked at first but is no longer alleviating or diarrhea. See \"status post cholecystectomy\" note above.  "

## 2018-03-27 NOTE — PROGRESS NOTES
"Chief Complaint   Patient presents with   • Diarrhea       HISTORY OF PRESENT ILLNESS: Patient is a 47 y.o. female established patient who presents today for evaluation and management of:    S/P cholecystectomy  Completed in October 2014, the patient states she continues to have diarrhea since the surgery was completed. She states this diarrhea has recently gotten worse without any diet changes.    Diarrhea in adult patient  This patient has tried many treatments to alleviate her diarrhea. She most recently tried using hydroxyzine which worked at first but is no longer alleviating or diarrhea. See \"status post cholecystectomy\" note above.       Patient Active Problem List    Diagnosis Date Noted   • Morbid obesity with BMI of 40.0-44.9, adult ([facility]) 09/20/2017   • Insomnia 08/09/2017   • Diarrhea in adult patient 08/09/2017   • Depression 09/01/2015   • S/P cholecystectomy 10/09/2014   • Obesity (BMI 30-39.9) 01/04/2013   • Vitamin D deficiency 02/07/2012   • Lumbar radiculitis 02/07/2012   • Anxiety disorder 01/09/2012     Allergies:Morphine    Current Outpatient Prescriptions   Medication Sig Dispense Refill   • cholestyramine (QUESTRAN) 4 g packet Take 4 g by mouth every day. Divided with meals. Increase by 1 gram per week for four weeks until symptoms stop. 41 Each 1   • polyethylene glycol 3350 (MIRALAX) Powder Take 17 g by mouth every day. 1 Bottle 0   • ondansetron (ZOFRAN ODT) 4 MG TABLET DISPERSIBLE Take 1 Tab by mouth every 8 hours as needed for Nausea. 20 Tab 0   • citalopram (CELEXA) 40 MG Tab Take 1 Tab by mouth every day. 30 Tab 0   • gabapentin (NEURONTIN) 300 MG Cap TAKE 2 CAPSULES BY MOUTH TWO TIMES DAILY 360 Cap 0   • norgestimate-ethinyl estradiol (SPRINTEC 28) 0.25-35 MG-MCG per tablet Take 1 Tab by mouth every day. 28 Tab 0   • hydrOXYzine (ATARAX) 25 MG Tab Take 1 Tab by mouth 3 times a day as needed for Anxiety. 90 Tab 11   • cholecalciferol (VITAMIN D3) 5000 UNIT Cap Take 1 Cap by mouth every " "day. 30 Cap 5   • DiphenhydrAMINE HCl (ALLERGY MED PO) Take  by mouth.       No current facility-administered medications for this visit.        Social History   Substance Use Topics   • Smoking status: Never Smoker   • Smokeless tobacco: Former User     Types: Chew     Quit date: 2000      Comment: unable to quantify chewing   • Alcohol use No       Family Status   Relation Status   • Mother Alive    64 in    • Father Alive    65 in    • Maternal Grandmother    • Paternal Grandmother      Family History   Problem Relation Age of Onset   • Hypertension Father    • Cancer Maternal Grandmother 50     breast cancer, cervical cancer and lung cancer   • Cancer Paternal Grandmother 60     breast cancer       Review of Systems:   Constitutional: Negative for fever, chills, weight loss and malaise positive for fatigue.   Eyes: Negative for blurred vision.   Respiratory: Negative for cough, or shortness of breath   Cardiovascular: Negative for chest pain, palpitations, orthopnea and leg swelling.   Gastrointestinal: Negative for heartburn, nausea, vomiting and abdominal pain.   Genitourinary: Negative for dysuria, urgency and frequency.   Musculoskeletal: Negative for myalgias, back pain and joint pain.   Skin: Negative for rash and itching.   Neurological: Negative for dizziness, tingling, tremors, sensory change, focal weakness and headaches.   Endo/Heme/Allergies: Does not bruise/bleed easily.   Psychiatric/Behavioral: Negative for depression, suicidal ideas and memory loss.  The patient is not nervous/anxious and does not have insomnia.      Exam:  Blood pressure 126/80, pulse (!) 102, temperature 37.2 °C (98.9 °F), resp. rate 16, height 1.651 m (5' 5\"), weight 118.4 kg (261 lb), SpO2 98 %.  Body mass index is 43.43 kg/m².  General: Morbidly Obese female in NAD  Head: is grossly normal.  Neck: Supple without masses. Thyroid is not visibly enlarged.  Pulmonary: Clear to ausculation. Normal " effort. No rales, ronchi, or wheezing.  Cardiovascular: Regular rate and rhythm without murmur. Carotid and radial pulses are intact and equal bilaterally.  Extremities: no clubbing, cyanosis, or edema.    Medical decision-making and discussion:  1. Diarrhea in adult patient  - cholestyramine (QUESTRAN) 4 g packet; Take 4 g by mouth every day. Divided with meals. Increase by 1 gram per week for four weeks until symptoms stop.  Dispense: 41 Each; Refill: 1    2. S/P cholecystectomy  - cholestyramine (QUESTRAN) 4 g packet; Take 4 g by mouth every day. Divided with meals. Increase by 1 gram per week for four weeks until symptoms stop.  Dispense: 41 Each; Refill: 1    3. Morbid obesity with BMI of 40.0-44.9, adult (HCC)  - Patient identified as having weight management issue.  Appropriate orders and counseling given.      Please note that this dictation was created using voice recognition software. I have made every reasonable attempt to correct obvious errors, but I expect that there are errors of grammar and possibly content that I did not discover before finalizing the note.      Return in about 4 weeks (around 4/23/2018) for diarrhea follow up.

## 2018-03-27 NOTE — ASSESSMENT & PLAN NOTE
Completed in October 2014, the patient states she continues to have diarrhea since the surgery was completed. She states this diarrhea has recently gotten worse without any diet changes.

## 2018-03-27 NOTE — ASSESSMENT & PLAN NOTE
This patient has lost about 3 lbs in the past 5 months. She has had a great deal of diarrhea. She is not exercising any more and hasn't reduced her calorie intake.

## 2018-04-11 ENCOUNTER — PATIENT MESSAGE (OUTPATIENT)
Dept: MEDICAL GROUP | Facility: CLINIC | Age: 48
End: 2018-04-11

## 2018-04-11 DIAGNOSIS — Z90.49 S/P CHOLECYSTECTOMY: ICD-10-CM

## 2018-04-11 DIAGNOSIS — R19.7 DIARRHEA IN ADULT PATIENT: ICD-10-CM

## 2018-04-12 RX ORDER — CHOLESTYRAMINE 4 G/9G
1 POWDER, FOR SUSPENSION ORAL DAILY
Qty: 41 EACH | Refills: 1 | Status: SHIPPED | OUTPATIENT
Start: 2018-04-12 | End: 2018-04-26 | Stop reason: SDUPTHER

## 2018-04-13 NOTE — TELEPHONE ENCOUNTER
From: Lori Mendes  To: Renetta Lozoya P.A.-C.  Sent: 4/11/2018 8:11 AM PDT  Subject: Prescription Question    I need a refill on my Cholestyramine. It is not on my refill list. I have one refill; however, the pharmacy will not let me refill this yet. If you could please let them know I can have more, seems to be working very good.  Thank you,  Lori Mendes  [phone]

## 2018-04-26 ENCOUNTER — OFFICE VISIT (OUTPATIENT)
Dept: MEDICAL GROUP | Facility: CLINIC | Age: 48
End: 2018-04-26
Payer: MEDICAID

## 2018-04-26 VITALS
HEART RATE: 85 BPM | TEMPERATURE: 98.6 F | HEIGHT: 65 IN | RESPIRATION RATE: 16 BRPM | WEIGHT: 262 LBS | SYSTOLIC BLOOD PRESSURE: 128 MMHG | BODY MASS INDEX: 43.65 KG/M2 | OXYGEN SATURATION: 98 % | DIASTOLIC BLOOD PRESSURE: 76 MMHG

## 2018-04-26 DIAGNOSIS — Z90.49 S/P CHOLECYSTECTOMY: ICD-10-CM

## 2018-04-26 DIAGNOSIS — R19.7 DIARRHEA IN ADULT PATIENT: ICD-10-CM

## 2018-04-26 PROCEDURE — 99214 OFFICE O/P EST MOD 30 MIN: CPT | Performed by: PHYSICIAN ASSISTANT

## 2018-04-26 RX ORDER — PHENTERMINE HYDROCHLORIDE 37.5 MG/1
37.5 CAPSULE ORAL EVERY MORNING
Qty: 30 CAP | Refills: 0 | Status: SHIPPED | OUTPATIENT
Start: 2018-04-26 | End: 2018-05-26

## 2018-04-26 RX ORDER — CHOLESTYRAMINE 4 G/9G
1 POWDER, FOR SUSPENSION ORAL 3 TIMES DAILY
Qty: 270 EACH | Refills: 3 | Status: SHIPPED | OUTPATIENT
Start: 2018-04-26 | End: 2018-08-13 | Stop reason: SDUPTHER

## 2018-04-30 PROBLEM — E66.01 MORBID OBESITY WITH BMI OF 40.0-44.9, ADULT (HCC): Status: RESOLVED | Noted: 2017-09-20 | Resolved: 2018-04-30

## 2018-04-30 NOTE — ASSESSMENT & PLAN NOTE
This patient has lost about 3 lbs in the past month. She is not exercising and hasn't reduced her calorie intake.

## 2018-04-30 NOTE — PROGRESS NOTES
"Chief Complaint   Patient presents with   • Diarrhea     Feeling better with meds        HISTORY OF PRESENT ILLNESS: Patient is a 47 y.o. female established patient who presents today for evaluation and management of:    Class 3 obesity due to excess calories without serious comorbidity with body mass index (BMI) of 40.0 to 44.9 in adult ([facility])  This patient has lost about 3 lbs in the past month. She is not exercising and hasn't reduced her calorie intake.     S/P cholecystectomy  Completed in October 2014, the patient states she continues to have diarrhea since the surgery was completed and has been using cholestyramine with great relief of her diarrhea symptoms. She is requesting refill of this medication at this time.    Diarrhea in adult patient  This patient has tried many treatments to alleviate her diarrhea. See \"status post cholecystectomy\" note above.       Patient Active Problem List    Diagnosis Date Noted   • Insomnia 08/09/2017   • Diarrhea in adult patient 08/09/2017   • Depression 09/01/2015   • S/P cholecystectomy 10/09/2014   • Class 3 obesity due to excess calories without serious comorbidity with body mass index (BMI) of 40.0 to 44.9 in adult ([facility]) 01/04/2013   • Vitamin D deficiency 02/07/2012   • Lumbar radiculitis 02/07/2012   • Anxiety disorder 01/09/2012       Allergies:Morphine    Current Outpatient Prescriptions   Medication Sig Dispense Refill   • cholestyramine (QUESTRAN) 4 g packet Take 4 g by mouth 3 times a day. 270 Each 3   • phentermine 37.5 MG capsule Take 1 Cap by mouth every morning for 30 days. 30 Cap 0   • citalopram (CELEXA) 40 MG Tab Take 1 Tab by mouth every day. 30 Tab 0   • gabapentin (NEURONTIN) 300 MG Cap TAKE 2 CAPSULES BY MOUTH TWO TIMES DAILY 360 Cap 0   • norgestimate-ethinyl estradiol (SPRINTEC 28) 0.25-35 MG-MCG per tablet Take 1 Tab by mouth every day. 28 Tab 0   • cholecalciferol (VITAMIN D3) 5000 UNIT Cap Take 1 Cap by mouth every day. 30 Cap 5     No current " "facility-administered medications for this visit.        Social History   Substance Use Topics   • Smoking status: Never Smoker   • Smokeless tobacco: Former User     Types: Chew     Quit date: 2000      Comment: unable to quantify chewing   • Alcohol use No       Family Status   Relation Status   • Mother Alive    64 in    • Father Alive    65 in    • Maternal Grandmother    • Paternal Grandmother      Family History   Problem Relation Age of Onset   • Hypertension Father    • Cancer Maternal Grandmother 50     breast cancer, cervical cancer and lung cancer   • Cancer Paternal Grandmother 60     breast cancer       Review of Systems:   Constitutional: Negative for fever, chills, weight loss and malaise/fatigue.   HENT: Negative for ear pain, nosebleeds, congestion, sore throat and neck pain.    Eyes: Negative for blurred vision.   Respiratory: Negative for cough, and shortness of breath Cardiovascular: Negative for chest pain and leg swelling.   Gastrointestinal: See history of present illness above. Negative for heartburn, nausea, vomiting and abdominal pain.   Skin: Negative for rash and itching.   Neurological: Negative for dizziness and headaches.   Endo/Heme/Allergies: Does not bruise/bleed easily.   Psychiatric/Behavioral: Negative for depression, suicidal ideas and memory loss.  The patient is not nervous/anxious and does not have insomnia.      Exam:  Blood pressure 128/76, pulse 85, temperature 37 °C (98.6 °F), resp. rate 16, height 1.651 m (5' 5\"), weight 118.8 kg (262 lb), SpO2 98 %.  Body mass index is 43.6 kg/m².  General:  Obese female in NAD  Head: is grossly normal.  Neck: Supple without masses. Thyroid is not visibly enlarged.  Pulmonary: Clear to ausculation. Normal effort. No rales, ronchi, or wheezing.  Cardiovascular: Regular rate and rhythm without murmur. Carotid and radial pulses are intact and equal bilaterally.  Extremities: no clubbing, cyanosis, or " edema.    Medical decision-making and discussion:  1. Diarrhea in adult patient  - cholestyramine (QUESTRAN) 4 g packet; Take 4 g by mouth 3 times a day.  Dispense: 270 Each; Refill: 3    2. S/P cholecystectomy  - cholestyramine (QUESTRAN) 4 g packet; Take 4 g by mouth 3 times a day.  Dispense: 270 Each; Refill: 3    3. Class 3 obesity due to excess calories without serious comorbidity with body mass index (BMI) of 40.0 to 44.9 in adult (HCC)  Diet and exercise were reviewed at length today. The patient was advised to begin counting calories in order to reduce her daily calorie intake and to increase her daily cardio vascular exercises to at least one hour daily and referred to lose weight. The following medication was also prescribed in order to help the patient with weight loss.  - phentermine 37.5 MG capsule; Take 1 Cap by mouth every morning for 30 days.  Dispense: 30 Cap; Refill: 0  - [facility] S[facility]; Future  - Controlled Substance Treatment Agreement      Please note that this dictation was created using voice recognition software. I have made every reasonable attempt to correct obvious errors, but I expect that there are errors of grammar and possibly content that I did not discover before finalizing the note.      Return in about 4 weeks (around 5/24/2018) for weight recheck.

## 2018-04-30 NOTE — ASSESSMENT & PLAN NOTE
Completed in October 2014, the patient states she continues to have diarrhea since the surgery was completed and has been using cholestyramine with great relief of her diarrhea symptoms. She is requesting refill of this medication at this time.

## 2018-04-30 NOTE — ASSESSMENT & PLAN NOTE
"This patient has tried many treatments to alleviate her diarrhea. See \"status post cholecystectomy\" note above.  "

## 2018-05-30 ENCOUNTER — OFFICE VISIT (OUTPATIENT)
Dept: MEDICAL GROUP | Facility: CLINIC | Age: 48
End: 2018-05-30
Payer: MEDICAID

## 2018-05-30 VITALS
WEIGHT: 254 LBS | TEMPERATURE: 97.8 F | DIASTOLIC BLOOD PRESSURE: 82 MMHG | RESPIRATION RATE: 14 BRPM | BODY MASS INDEX: 42.32 KG/M2 | HEART RATE: 82 BPM | SYSTOLIC BLOOD PRESSURE: 124 MMHG | OXYGEN SATURATION: 97 % | HEIGHT: 65 IN

## 2018-05-30 DIAGNOSIS — F32.A DEPRESSION, UNSPECIFIED DEPRESSION TYPE: ICD-10-CM

## 2018-05-30 PROCEDURE — 99213 OFFICE O/P EST LOW 20 MIN: CPT | Performed by: PHYSICIAN ASSISTANT

## 2018-05-30 RX ORDER — CITALOPRAM 40 MG/1
40 TABLET ORAL DAILY
Qty: 90 TAB | Refills: 3 | Status: SHIPPED | OUTPATIENT
Start: 2018-05-30 | End: 2019-04-09 | Stop reason: SDUPTHER

## 2018-05-30 RX ORDER — PHENTERMINE HYDROCHLORIDE 37.5 MG/1
37.5 TABLET ORAL EVERY MORNING
Qty: 30 TAB | Refills: 0 | Status: SHIPPED | OUTPATIENT
Start: 2018-05-30 | End: 2018-06-28 | Stop reason: SDUPTHER

## 2018-05-30 RX ORDER — PHENTERMINE HYDROCHLORIDE 37.5 MG/1
TABLET ORAL
Refills: 0 | COMMUNITY
Start: 2018-04-27 | End: 2018-05-30 | Stop reason: SDUPTHER

## 2018-05-30 RX ORDER — TOPIRAMATE 25 MG/1
25 TABLET ORAL 2 TIMES DAILY
Qty: 60 TAB | Refills: 1 | Status: SHIPPED | OUTPATIENT
Start: 2018-05-30 | End: 2018-06-28 | Stop reason: SDUPTHER

## 2018-05-30 ASSESSMENT — PAIN SCALES - GENERAL: PAINLEVEL: NO PAIN

## 2018-05-30 NOTE — ASSESSMENT & PLAN NOTE
This patient is currently using phentermine 37.5 mg every morning for weight loss. She has lost 8 pounds since April 26, 2018. She has been watching her diet much more closely although she has not started exercising to reduce her weight.    1. This provider has no reason to believe this patient is using the controlled substance provided in any way other than prescribed or diverting this medication for use by another person.   2. This controlled substance has alleviated the symptoms it was prescribed to control.   3. This patient denies use of drugs, including alcohol or schedule I controlled substances or prescription drugs that may interact negatively with the controlled substance prescribed or have not been prescribed by a practitioner who is treating the patient.   4. The patient has never attempted to obtain early refills of this prescription.   5. The patient has never claimed that the controlled substance has been lost or stolen.   6. The  is consistent and indicates appropriate use of the medication.   7. No previous blood or urine tests have indicated inappropriate use of this controlled substance by the patient.   8. No controlled substance not authorized under the patient's treatment plan is present in the body of the patient as indicated by urine testing.   9. The patient has not demonstrated aberrant behavior or intoxication.   10. The patient has not increased their dose of the controlled substance without authorization by the provider.   11. The patient has not been reluctant to stop using the controlled substance or requested or demanded a controlled substance that is likely to be abused or cause dependency or addiction.   12. The patient has not been reluctant to cooperate with any examination, analysis or test recommended by the provider.   13. This patient has no substance abuse history.   14. There has been no major change in health of the patient or any diagnosis concerning the mental health  of the patient that would affect the medical appropriateness of prescribing the controlled substance for the patient.   15. There is no other evidence that this patient is chronically using opioids, misusing, abusing, illegally using or addicted to any drug or failing to comply with the instructions of the practitioner concerning the use of the controlled substance.   16. There is no other factor that the practitioner determines is necessary to make an informed professional judgment concerning the medical appropriateness of the prescription.  17. The [place]  for this patient has been reviewed and it has been determined that this prescription is medically necessary.   18. This patient has not been issued another prescription for this same controlled substance for ongoing treatment.   19. Medical records from previous providers have been attempted to be obtained and reviewed and do not show increased risk of abuse of this medication.
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
## 2018-05-31 NOTE — PROGRESS NOTES
Chief Complaint   Patient presents with   • Medication Refill     follow-up / phentermine        HISTORY OF PRESENT ILLNESS: Patient is a 47 y.o. female established patient who presents today for evaluation and management of:    Class 3 obesity due to excess calories without serious comorbidity with body mass index (BMI) of 40.0 to 44.9 in adult (HCC)  This patient is currently using phentermine 37.5 mg every morning for weight loss. She has lost 8 pounds since April 26, 2018. She has been watching her diet much more closely although she has not started exercising to reduce her weight.    1. This provider has no reason to believe this patient is using the controlled substance provided in any way other than prescribed or diverting this medication for use by another person.   2. This controlled substance has alleviated the symptoms it was prescribed to control.   3. This patient denies use of drugs, including alcohol or schedule I controlled substances or prescription drugs that may interact negatively with the controlled substance prescribed or have not been prescribed by a practitioner who is treating the patient.   4. The patient has never attempted to obtain early refills of this prescription.   5. The patient has never claimed that the controlled substance has been lost or stolen.   6. The  is consistent and indicates appropriate use of the medication.   7. No previous blood or urine tests have indicated inappropriate use of this controlled substance by the patient.   8. No controlled substance not authorized under the patient's treatment plan is present in the body of the patient as indicated by urine testing.   9. The patient has not demonstrated aberrant behavior or intoxication.   10. The patient has not increased their dose of the controlled substance without authorization by the provider.   11. The patient has not been reluctant to stop using the controlled substance or requested or demanded a controlled  substance that is likely to be abused or cause dependency or addiction.   12. The patient has not been reluctant to cooperate with any examination, analysis or test recommended by the provider.   13. This patient has no substance abuse history.   14. There has been no major change in health of the patient or any diagnosis concerning the mental health of the patient that would affect the medical appropriateness of prescribing the controlled substance for the patient.   15. There is no other evidence that this patient is chronically using opioids, misusing, abusing, illegally using or addicted to any drug or failing to comply with the instructions of the practitioner concerning the use of the controlled substance.   16. There is no other factor that the practitioner determines is necessary to make an informed professional judgment concerning the medical appropriateness of the prescription.  17. The [place]  for this patient has been reviewed and it has been determined that this prescription is medically necessary.   18. This patient has not been issued another prescription for this same controlled substance for ongoing treatment.   19. Medical records from previous providers have been attempted to be obtained and reviewed and do not show increased risk of abuse of this medication.     Depression  Patient had been taking celexa 40 mg once per day. She stated that she wanted to stop taking this medication and so weaned herself off slowly. She found at this time that she became very aggressive with her boyfriend and so started taking her 40 mg Celexa dose every day. Her symptoms have since dissipated. She is requesting medication refills at this time as she feels very well controlled. She denies suicidal and homicidal ideations.       Patient Active Problem List    Diagnosis Date Noted   • Insomnia 08/09/2017   • Diarrhea in adult patient 08/09/2017   • Depression 09/01/2015   • S/P cholecystectomy 10/09/2014   • Class  3 obesity due to excess calories without serious comorbidity with body mass index (BMI) of 40.0 to 44.9 in adult ([facility]) 2013   • Vitamin D deficiency 2012   • Lumbar radiculitis 2012   • Anxiety disorder 2012       Allergies:Morphine    Current Outpatient Prescriptions   Medication Sig Dispense Refill   • topiramate (TOPAMAX) 25 MG Tab Take 1 Tab by mouth 2 times a day. 60 Tab 1   • phentermine (ADIPEX-P) 37.5 MG tablet Take 1 Tab by mouth every morning for 30 days. 30 Tab 0   • citalopram (CELEXA) 40 MG Tab Take 1 Tab by mouth every day. 90 Tab 3   • cholestyramine (QUESTRAN) 4 g packet Take 4 g by mouth 3 times a day. 270 Each 3   • gabapentin (NEURONTIN) 300 MG Cap TAKE 2 CAPSULES BY MOUTH TWO TIMES DAILY 360 Cap 0   • norgestimate-ethinyl estradiol (SPRINTEC 28) 0.25-35 MG-MCG per tablet Take 1 Tab by mouth every day. 28 Tab 0   • cholecalciferol (VITAMIN D3) 5000 UNIT Cap Take 1 Cap by mouth every day. 30 Cap 5     No current facility-administered medications for this visit.        Social History   Substance Use Topics   • Smoking status: Never Smoker   • Smokeless tobacco: Former User     Types: Chew     Quit date: 2000      Comment: unable to quantify chewing   • Alcohol use No       Family Status   Relation Status   • Mother Alive    64 in    • Father Alive    65 in    • Maternal Grandmother    • Paternal Grandmother      Family History   Problem Relation Age of Onset   • Hypertension Father    • Cancer Maternal Grandmother 50     breast cancer, cervical cancer and lung cancer   • Cancer Paternal Grandmother 60     breast cancer       Review of Systems:   Constitutional: Negative for fever, chills, unintended weight loss and malaise/fatigue.   HENT: Negative for ear pain, nosebleeds, congestion, sore throat and neck pain.    Respiratory: Negative for cough or shortness of breath  Cardiovascular: Negative for chest pain and leg swelling.   Gastrointestinal:  "Negative for heartburn, nausea, vomiting and abdominal pain.   Neurological: Negative for dizziness and headaches.   Endo/Heme/Allergies: Does not bruise/bleed easily.   Psychiatric/Behavioral: Negative for depression, suicidal ideas and memory loss.  The patient is not nervous/anxious and does not have insomnia.      Exam:  Blood pressure 124/82, pulse 82, temperature 36.6 °C (97.8 °F), resp. rate 14, height 1.651 m (5' 5\"), weight 115.2 kg (254 lb), SpO2 97 %.  Body mass index is 42.27 kg/m².  General:  Obese female in NAD  Head: is grossly normal.  Neck: Supple without masses. Thyroid is not visibly enlarged.  Pulmonary: Clear to ausculation. Normal effort. No rales, ronchi, or wheezing.  Cardiovascular: Regular rate and rhythm without murmur. Carotid and radial pulses are intact and equal bilaterally.  Extremities: no clubbing, cyanosis, or edema.    Medical decision-making and discussion:  1. Class 3 obesity due to excess calories without serious comorbidity with body mass index (BMI) of 40.0 to 44.9 in adult (HCC)  Continue diet control.   - topiramate (TOPAMAX) 25 MG Tab; Take 1 Tab by mouth 2 times a day.  Dispense: 60 Tab; Refill: 1  - phentermine (ADIPEX-P) 37.5 MG tablet; Take 1 Tab by mouth every morning for 30 days.  Dispense: 30 Tab; Refill: 0    2. Depression, unspecified depression type  - citalopram (CELEXA) 40 MG Tab; Take 1 Tab by mouth every day.  Dispense: 90 Tab; Refill: 3      Please note that this dictation was created using voice recognition software. I have made every reasonable attempt to correct obvious errors, but I expect that there are errors of grammar and possibly content that I did not discover before finalizing the note.      Return for Female annual ASAP, 4 weeks weight check.  "

## 2018-06-28 ENCOUNTER — OFFICE VISIT (OUTPATIENT)
Dept: MEDICAL GROUP | Facility: CLINIC | Age: 48
End: 2018-06-28
Payer: COMMERCIAL

## 2018-06-28 VITALS
TEMPERATURE: 98.4 F | SYSTOLIC BLOOD PRESSURE: 128 MMHG | OXYGEN SATURATION: 94 % | BODY MASS INDEX: 41.82 KG/M2 | DIASTOLIC BLOOD PRESSURE: 72 MMHG | RESPIRATION RATE: 16 BRPM | HEIGHT: 65 IN | HEART RATE: 100 BPM | WEIGHT: 251 LBS

## 2018-06-28 DIAGNOSIS — M54.16 LUMBAR RADICULITIS: ICD-10-CM

## 2018-06-28 DIAGNOSIS — Z12.11 SPECIAL SCREENING FOR MALIGNANT NEOPLASM OF COLON: ICD-10-CM

## 2018-06-28 PROCEDURE — 99213 OFFICE O/P EST LOW 20 MIN: CPT | Performed by: PHYSICIAN ASSISTANT

## 2018-06-28 RX ORDER — PHENTERMINE HYDROCHLORIDE 37.5 MG/1
37.5 TABLET ORAL EVERY MORNING
Qty: 30 TAB | Refills: 0 | Status: SHIPPED | OUTPATIENT
Start: 2018-06-30 | End: 2018-07-17 | Stop reason: SDUPTHER

## 2018-06-28 RX ORDER — GABAPENTIN 300 MG/1
600 CAPSULE ORAL 2 TIMES DAILY
Qty: 360 CAP | Refills: 3 | Status: SHIPPED | OUTPATIENT
Start: 2018-06-28 | End: 2019-07-17 | Stop reason: SDUPTHER

## 2018-06-28 NOTE — ASSESSMENT & PLAN NOTE
This patient is currently using phentermine 37.5 mg every morning for weight loss. She has lost 11 pounds since April 26, 2018. She has been watching her diet much more closely although she has not started exercising to reduce her weight.    1. This provider has no reason to believe this patient is using the controlled substance provided in any way other than prescribed or diverting this medication for use by another person.   2. This controlled substance has alleviated the symptoms it was prescribed to control.   3. This patient denies use of drugs, including alcohol or schedule I controlled substances or prescription drugs that may interact negatively with the controlled substance prescribed or have not been prescribed by a practitioner who is treating the patient.   4. The patient has never attempted to obtain early refills of this prescription.   5. The patient has never claimed that the controlled substance has been lost or stolen.   6. The  is consistent and indicates appropriate use of the medication.   7. No previous blood or urine tests have indicated inappropriate use of this controlled substance by the patient.   8. No controlled substance not authorized under the patient's treatment plan is present in the body of the patient as indicated by urine testing.   9. The patient has not demonstrated aberrant behavior or intoxication.   10. The patient has not increased their dose of the controlled substance without authorization by the provider.   11. The patient has not been reluctant to stop using the controlled substance or requested or demanded a controlled substance that is likely to be abused or cause dependency or addiction.   12. The patient has not been reluctant to cooperate with any examination, analysis or test recommended by the provider.   13. This patient has no substance abuse history.   14. There has been no major change in health of the patient or any diagnosis concerning the mental health  of the patient that would affect the medical appropriateness of prescribing the controlled substance for the patient.   15. There is no other evidence that this patient is chronically using opioids, misusing, abusing, illegally using or addicted to any drug or failing to comply with the instructions of the practitioner concerning the use of the controlled substance.   16. There is no other factor that the practitioner determines is necessary to make an informed professional judgment concerning the medical appropriateness of the prescription.  17. The [place]  for this patient has been reviewed and it has been determined that this prescription is medically necessary.   18. This patient has not been issued another prescription for this same controlled substance for ongoing treatment.   19. Medical records from previous providers have been attempted to be obtained and reviewed and do not show increased risk of abuse of this medication.

## 2018-06-28 NOTE — PROGRESS NOTES
Chief Complaint   Patient presents with   • Diarrhea     FV       HISTORY OF PRESENT ILLNESS: Patient is a 48 y.o. female established patient who presents today for evaluation and management of:    Lumbar radiculitis  Patient is requesting medication refills at this time. Well-controlled with twice a day 300 mg gabapentin. Patient is not having any adverse side effects at this time.    Class 3 obesity due to excess calories without serious comorbidity with body mass index (BMI) of 40.0 to 44.9 in adult (HCC)  This patient is currently using phentermine 37.5 mg every morning for weight loss. She has lost 11 pounds since April 26, 2018. She has been watching her diet much more closely although she has not started exercising to reduce her weight.    1. This provider has no reason to believe this patient is using the controlled substance provided in any way other than prescribed or diverting this medication for use by another person.   2. This controlled substance has alleviated the symptoms it was prescribed to control.   3. This patient denies use of drugs, including alcohol or schedule I controlled substances or prescription drugs that may interact negatively with the controlled substance prescribed or have not been prescribed by a practitioner who is treating the patient.   4. The patient has never attempted to obtain early refills of this prescription.   5. The patient has never claimed that the controlled substance has been lost or stolen.   6. The  is consistent and indicates appropriate use of the medication.   7. No previous blood or urine tests have indicated inappropriate use of this controlled substance by the patient.   8. No controlled substance not authorized under the patient's treatment plan is present in the body of the patient as indicated by urine testing.   9. The patient has not demonstrated aberrant behavior or intoxication.   10. The patient has not increased their dose of the controlled  substance without authorization by the provider.   11. The patient has not been reluctant to stop using the controlled substance or requested or demanded a controlled substance that is likely to be abused or cause dependency or addiction.   12. The patient has not been reluctant to cooperate with any examination, analysis or test recommended by the provider.   13. This patient has no substance abuse history.   14. There has been no major change in health of the patient or any diagnosis concerning the mental health of the patient that would affect the medical appropriateness of prescribing the controlled substance for the patient.   15. There is no other evidence that this patient is chronically using opioids, misusing, abusing, illegally using or addicted to any drug or failing to comply with the instructions of the practitioner concerning the use of the controlled substance.   16. There is no other factor that the practitioner determines is necessary to make an informed professional judgment concerning the medical appropriateness of the prescription.  17. The [place]  for this patient has been reviewed and it has been determined that this prescription is medically necessary.   18. This patient has not been issued another prescription for this same controlled substance for ongoing treatment.   19. Medical records from previous providers have been attempted to be obtained and reviewed and do not show increased risk of abuse of this medication.        Patient Active Problem List    Diagnosis Date Noted   • Insomnia 08/09/2017   • Diarrhea in adult patient 08/09/2017   • Depression 09/01/2015   • S/P cholecystectomy 10/09/2014   • Class 3 obesity due to excess calories without serious comorbidity with body mass index (BMI) of 40.0 to 44.9 in adult ([facility]) 01/04/2013   • Vitamin D deficiency 02/07/2012   • Lumbar radiculitis 02/07/2012   • Anxiety disorder 01/09/2012       Allergies:Morphine    Current Outpatient  Prescriptions   Medication Sig Dispense Refill   • [START ON 2018] phentermine (ADIPEX-P) 37.5 MG tablet Take 1 Tab by mouth every morning for 30 days. 30 Tab 0   • gabapentin (NEURONTIN) 300 MG Cap Take 2 Caps by mouth 2 Times a Day. 360 Cap 3   • topiramate (TOPAMAX) 25 MG Tab Take 1 Tab by mouth 2 times a day. 60 Tab 1   • citalopram (CELEXA) 40 MG Tab Take 1 Tab by mouth every day. 90 Tab 3   • cholestyramine (QUESTRAN) 4 g packet Take 4 g by mouth 3 times a day. 270 Each 3   • norgestimate-ethinyl estradiol (SPRINTEC 28) 0.25-35 MG-MCG per tablet Take 1 Tab by mouth every day. 28 Tab 0   • cholecalciferol (VITAMIN D3) 5000 UNIT Cap Take 1 Cap by mouth every day. 30 Cap 5     No current facility-administered medications for this visit.        Social History   Substance Use Topics   • Smoking status: Never Smoker   • Smokeless tobacco: Former User     Types: Chew     Quit date: 2000      Comment: unable to quantify chewing   • Alcohol use No       Family Status   Relation Status   • Mother Alive    64 in    • Father Alive    65 in    • Maternal Grandmother    • Paternal Grandmother      Family History   Problem Relation Age of Onset   • Hypertension Father    • Cancer Maternal Grandmother 50     breast cancer, cervical cancer and lung cancer   • Cancer Paternal Grandmother 60     breast cancer       Review of Systems: See HPI above.  Constitutional: Negative for fever, chills, unintended weight loss and malaise/fatigue.   HENT: Negative for ear pain, nosebleeds, congestion, sore throat and neck pain.    Eyes: Negative for blurred vision.   Respiratory: Negative for cough, sputum production, shortness of breath and wheezing.    Cardiovascular: Negative for chest pain, palpitations, orthopnea and leg swelling.   Gastrointestinal: Positive for mild diarrhea with start of topiramate which has dissipated.  Negative for heartburn, nausea, vomiting and abdominal pain.   Genitourinary:  "Negative for dysuria, urgency and frequency.   Musculoskeletal: Negative for myalgias, back pain and joint pain.   Skin: Negative for rash and itching.   Neurological: Negative for dizziness, tingling, tremors, sensory change, focal weakness and headaches.   Endo/Heme/Allergies: Does not bruise/bleed easily.   Psychiatric/Behavioral: Negative for depression, suicidal ideas and memory loss.  The patient is not nervous/anxious and does not have insomnia.      Exam:  Blood pressure 128/72, pulse 100, temperature 36.9 °C (98.4 °F), resp. rate 16, height 1.651 m (5' 5\"), weight 113.9 kg (251 lb), SpO2 94 %.  Body mass index is 41.77 kg/m².  General:  Obese female in NAD  Head: is grossly normal.  Neck: Supple without masses. Thyroid is not visibly enlarged.  Pulmonary: Clear to ausculation. Normal effort. No rales, ronchi, or wheezing.  Cardiovascular: Regular rate and rhythm without murmur. Carotid and radial pulses are intact and equal bilaterally.  Extremities: no clubbing, cyanosis, or edema.    Medical decision-making and discussion:  1. Class 3 obesity due to excess calories without serious comorbidity with body mass index (BMI) of 40.0 to 44.9 in adult (HCC)  Patient was advised to continue using topiramate as prescribed.  - phentermine (ADIPEX-P) 37.5 MG tablet; Take 1 Tab by mouth every morning for 30 days.  Dispense: 30 Tab; Refill: 0    2. Lumbar radiculitis    - gabapentin (NEURONTIN) 300 MG Cap; Take 2 Caps by mouth 2 Times a Day.  Dispense: 360 Cap; Refill: 3    3. Special screening for malignant neoplasm of colon    - COLOGUARD (FIT DNA)      Please note that this dictation was created using voice recognition software. I have made every reasonable attempt to correct obvious errors, but I expect that there are errors of grammar and possibly content that I did not discover before finalizing the note.      Return in about 5 weeks (around 8/2/2018) for weight check.  "

## 2018-06-28 NOTE — ASSESSMENT & PLAN NOTE
Patient is requesting medication refills at this time. Well-controlled with twice a day 300 mg gabapentin. Patient is not having any adverse side effects at this time.

## 2018-07-18 RX ORDER — PHENTERMINE HYDROCHLORIDE 37.5 MG/1
37.5 TABLET ORAL EVERY MORNING
Qty: 30 TAB | Refills: 0 | Status: SHIPPED | OUTPATIENT
Start: 2018-07-18 | End: 2018-08-13 | Stop reason: SDUPTHER

## 2018-07-18 NOTE — TELEPHONE ENCOUNTER
From: Lori Mendes  To: Renetta Lozoya P.A.-C.  Sent: 7/17/2018 2:32 PM PDT  Subject: Medication Renewal Request    Lori eMndes would like a refill of the following medications:     phentermine (ADIPEX-P) 37.5 MG tablet [Renetta Lozoya P.A.-C.]    Preferred pharmacy: [facility] - [address]

## 2018-08-13 ENCOUNTER — OFFICE VISIT (OUTPATIENT)
Dept: MEDICAL GROUP | Facility: CLINIC | Age: 48
End: 2018-08-13
Payer: COMMERCIAL

## 2018-08-13 VITALS
OXYGEN SATURATION: 98 % | WEIGHT: 248.8 LBS | RESPIRATION RATE: 14 BRPM | TEMPERATURE: 97.5 F | HEIGHT: 65 IN | HEART RATE: 97 BPM | DIASTOLIC BLOOD PRESSURE: 76 MMHG | BODY MASS INDEX: 41.45 KG/M2 | SYSTOLIC BLOOD PRESSURE: 128 MMHG

## 2018-08-13 DIAGNOSIS — K91.1 DUMPING SYNDROME: ICD-10-CM

## 2018-08-13 DIAGNOSIS — Z90.49 S/P CHOLECYSTECTOMY: ICD-10-CM

## 2018-08-13 PROCEDURE — 99213 OFFICE O/P EST LOW 20 MIN: CPT | Performed by: PHYSICIAN ASSISTANT

## 2018-08-13 RX ORDER — PHENTERMINE HYDROCHLORIDE 37.5 MG/1
37.5 TABLET ORAL EVERY MORNING
Qty: 30 TAB | Refills: 0 | Status: SHIPPED | OUTPATIENT
Start: 2018-08-20 | End: 2018-09-19

## 2018-08-13 RX ORDER — CHOLESTYRAMINE 4 G/9G
1.5 POWDER, FOR SUSPENSION ORAL 3 TIMES DAILY
Qty: 240 EACH | Refills: 2 | Status: SHIPPED | OUTPATIENT
Start: 2018-08-13 | End: 2018-11-11

## 2018-08-13 RX ORDER — HYDROXYZINE HYDROCHLORIDE 25 MG/1
TABLET, FILM COATED ORAL
Refills: 11 | COMMUNITY
Start: 2018-07-20 | End: 2018-09-21 | Stop reason: SDUPTHER

## 2018-08-13 NOTE — ASSESSMENT & PLAN NOTE
This patient is currently using phentermine 37.5 mg every morning for weight loss. She has taken a 4 day break from this medication while experiencing diarrhea over the past month but did take this medication this morning. She has lost 14 pounds since April 26, 2018. She has been watching her diet much more closely although she still has not started exercising to reduce her weight.    1. This provider has no reason to believe this patient is using the controlled substance provided in any way other than prescribed or diverting this medication for use by another person.   2. This controlled substance has alleviated the symptoms it was prescribed to control.   3. This patient denies use of drugs, including alcohol or schedule I controlled substances or prescription drugs that may interact negatively with the controlled substance prescribed or have not been prescribed by a practitioner who is treating the patient.   4. The patient has never attempted to obtain early refills of this prescription.   5. The patient has never claimed that the controlled substance has been lost or stolen.   6. The  is consistent and indicates appropriate use of the medication.   7. No previous blood or urine tests have indicated inappropriate use of this controlled substance by the patient.   8. No controlled substance not authorized under the patient's treatment plan is present in the body of the patient as indicated by urine testing.   9. The patient has not demonstrated aberrant behavior or intoxication.   10. The patient has not increased their dose of the controlled substance without authorization by the provider.   11. The patient has not been reluctant to stop using the controlled substance or requested or demanded a controlled substance that is likely to be abused or cause dependency or addiction.   12. The patient has not been reluctant to cooperate with any examination, analysis or test recommended by the provider.   13. This  patient has no substance abuse history.   14. There has been no major change in health of the patient or any diagnosis concerning the mental health of the patient that would affect the medical appropriateness of prescribing the controlled substance for the patient.   15. There is no other evidence that this patient is chronically using opioids, misusing, abusing, illegally using or addicted to any drug or failing to comply with the instructions of the practitioner concerning the use of the controlled substance.   16. There is no other factor that the practitioner determines is necessary to make an informed professional judgment concerning the medical appropriateness of the prescription.  17. The [place]  for this patient has been reviewed and it has been determined that this prescription is medically necessary.   18. This patient has not been issued another prescription for this same controlled substance for ongoing treatment.   19. Medical records from previous providers have been attempted to be obtained and reviewed and do not show increased risk of abuse of this medication.

## 2018-08-13 NOTE — ASSESSMENT & PLAN NOTE
Had been much improved with 4g TID cholestyramine but recently this stopped working without a major diet change. Patient increased to 5g TID and probiotics and diarrhea resolved but she is now gassy and bloated. She is now avoiding greasy, fibrous foods again due to discomfort.

## 2018-08-13 NOTE — PROGRESS NOTES
Chief Complaint   Patient presents with   • Weight Loss       HISTORY OF PRESENT ILLNESS: Patient is a 48 y.o. female established patient who presents today for evaluation and management of:    Class 3 obesity due to excess calories without serious comorbidity with body mass index (BMI) of 40.0 to 44.9 in adult (HCC)  This patient is currently using phentermine 37.5 mg every morning for weight loss. She has taken a 4 day break from this medication while experiencing diarrhea over the past month but did take this medication this morning. She has lost 14 pounds since April 26, 2018. She has been watching her diet much more closely although she still has not started exercising to reduce her weight.    1. This provider has no reason to believe this patient is using the controlled substance provided in any way other than prescribed or diverting this medication for use by another person.   2. This controlled substance has alleviated the symptoms it was prescribed to control.   3. This patient denies use of drugs, including alcohol or schedule I controlled substances or prescription drugs that may interact negatively with the controlled substance prescribed or have not been prescribed by a practitioner who is treating the patient.   4. The patient has never attempted to obtain early refills of this prescription.   5. The patient has never claimed that the controlled substance has been lost or stolen.   6. The  is consistent and indicates appropriate use of the medication.   7. No previous blood or urine tests have indicated inappropriate use of this controlled substance by the patient.   8. No controlled substance not authorized under the patient's treatment plan is present in the body of the patient as indicated by urine testing.   9. The patient has not demonstrated aberrant behavior or intoxication.   10. The patient has not increased their dose of the controlled substance without authorization by the provider.   11.  The patient has not been reluctant to stop using the controlled substance or requested or demanded a controlled substance that is likely to be abused or cause dependency or addiction.   12. The patient has not been reluctant to cooperate with any examination, analysis or test recommended by the provider.   13. This patient has no substance abuse history.   14. There has been no major change in health of the patient or any diagnosis concerning the mental health of the patient that would affect the medical appropriateness of prescribing the controlled substance for the patient.   15. There is no other evidence that this patient is chronically using opioids, misusing, abusing, illegally using or addicted to any drug or failing to comply with the instructions of the practitioner concerning the use of the controlled substance.   16. There is no other factor that the practitioner determines is necessary to make an informed professional judgment concerning the medical appropriateness of the prescription.  17. The [place]  for this patient has been reviewed and it has been determined that this prescription is medically necessary.   18. This patient has not been issued another prescription for this same controlled substance for ongoing treatment.   19. Medical records from previous providers have been attempted to be obtained and reviewed and do not show increased risk of abuse of this medication.     Dumping syndrome  Had been much improved with 4g TID cholestyramine but recently this stopped working without a major diet change. Patient increased to 5g TID and probiotics and diarrhea resolved but she is now gassy and bloated. She is now avoiding greasy, fibrous foods again due to discomfort.        Patient Active Problem List    Diagnosis Date Noted   • Insomnia 08/09/2017   • Dumping syndrome 08/09/2017   • Depression 09/01/2015   • S/P cholecystectomy 10/09/2014   • Class 3 obesity due to excess calories without serious  comorbidity with body mass index (BMI) of 40.0 to 44.9 in adult (HCC) 2013   • Vitamin D deficiency 2012   • Lumbar radiculitis 2012   • Anxiety disorder 2012       Allergies:Morphine    Current Outpatient Prescriptions   Medication Sig Dispense Refill   • cholestyramine (QUESTRAN) 4 g packet Take 6 g by mouth 3 times a day for 90 days. 240 Each 2   • [START ON 2018] phentermine (ADIPEX-P) 37.5 MG tablet Take 1 Tab by mouth every morning for 30 days. 30 Tab 0   • hydrOXYzine HCl (ATARAX) 25 MG Tab   11   • topiramate (TOPAMAX) 25 MG Tab TAKE 1 TABLET BY MOUTH TWO TIMES DAILY 90 Tab 1   • SPRINTEC 28 0.25-35 MG-MCG per tablet TAKE 1 TABLET BY MOUTH DAILY 28 Tab 11   • gabapentin (NEURONTIN) 300 MG Cap Take 2 Caps by mouth 2 Times a Day. 360 Cap 3   • citalopram (CELEXA) 40 MG Tab Take 1 Tab by mouth every day. 90 Tab 3   • cholecalciferol (VITAMIN D3) 5000 UNIT Cap Take 1 Cap by mouth every day. 30 Cap 5     No current facility-administered medications for this visit.        Social History   Substance Use Topics   • Smoking status: Never Smoker   • Smokeless tobacco: Former User     Types: Chew     Quit date: 2000      Comment: unable to quantify chewing   • Alcohol use No       Family Status   Relation Status   • Mo Alive        64 in    • Fa Alive        65 in    • MGMo    • PGMo      Family History   Problem Relation Age of Onset   • Hypertension Father    • Cancer Maternal Grandmother 50        breast cancer, cervical cancer and lung cancer   • Cancer Paternal Grandmother 60        breast cancer       Review of Systems: See HPI above.   Constitutional: Negative for fever, chills, unintentional weight loss and malaise/fatigue.   HENT: Negative for ear pain, nosebleeds, congestion, sore throat and neck pain.    Eyes: Negative for blurred vision.   Respiratory: Negative for cough, sputum production, shortness of breath and wheezing.    Cardiovascular: Negative  "for chest pain, palpitations, orthopnea and leg swelling.   Gastrointestinal: Negative for heartburn, nausea, vomiting and positive for diarrhea and abdominal pain.   Neurological: Negative for dizziness and headaches.   Endo/Heme/Allergies: Does not bruise/bleed easily.   Psychiatric/Behavioral: Negative for depression, suicidal ideas and memory loss.  The patient is not nervous/anxious and does not have insomnia.      Exam:  Blood pressure 128/76, pulse 97, temperature 36.4 °C (97.5 °F), resp. rate 14, height 1.651 m (5' 5\"), weight 112.9 kg (248 lb 12.8 oz), SpO2 98 %.  Body mass index is 41.4 kg/m².  General:  Obese female in NAD  Head: is grossly normal.  Neck: Supple without masses. Thyroid is not visibly enlarged.  Pulmonary: Clear to ausculation. Normal effort. No rales, ronchi, or wheezing.  Cardiovascular: Regular rate and rhythm without murmur. Carotid and radial pulses are intact and equal bilaterally.  Extremities: no clubbing, cyanosis, or edema.    Medical decision-making and discussion:  1. Dumping syndrome  Try stopping probiotics to see if gas stops and stay at 5G TID cholestyramine then, if symptoms persist, see the following provider.   - REFERRAL TO GASTROENTEROLOGY  - cholestyramine (QUESTRAN) 4 g packet; Take 5-6 g by mouth 3 times a day for 90 days.  Dispense: 240 Each; Refill: 2    2. S/P cholecystectomy    - cholestyramine (QUESTRAN) 4 g packet; Take 5-6 g by mouth 3 times a day for 90 days.  Dispense: 240 Each; Refill: 2    3. Class 3 obesity due to excess calories without serious comorbidity with body mass index (BMI) of 40.0 to 44.9 in adult ([facility])    - phentermine (ADIPEX-P) 37.5 MG tablet; Take 1 Tab by mouth every morning for 30 days.  Dispense: 30 Tab; Refill: 0      Please note that this dictation was created using voice recognition software. I have made every reasonable attempt to correct obvious errors, but I expect that there are errors of grammar and possibly content that I did " not discover before finalizing the note.      Return in about 5 weeks (around 9/17/2018) for weight check and diarrhea.

## 2018-08-17 ENCOUNTER — HOSPITAL ENCOUNTER (OUTPATIENT)
Dept: RADIOLOGY | Facility: MEDICAL CENTER | Age: 48
End: 2018-08-17
Attending: PHYSICIAN ASSISTANT
Payer: COMMERCIAL

## 2018-08-17 DIAGNOSIS — Z12.39 SCREENING BREAST EXAMINATION: ICD-10-CM

## 2018-08-17 PROCEDURE — 77067 SCR MAMMO BI INCL CAD: CPT

## 2018-08-22 ENCOUNTER — TELEPHONE (OUTPATIENT)
Dept: MEDICAL GROUP | Facility: CLINIC | Age: 48
End: 2018-08-22

## 2018-08-22 NOTE — TELEPHONE ENCOUNTER
----- Message from Renetta Lozoya P.A.-C. sent at 8/21/2018 12:50 PM PDT -----  Mammogram has been read as normal. Please recheck in one year.

## 2018-09-18 ENCOUNTER — HOSPITAL ENCOUNTER (OUTPATIENT)
Facility: MEDICAL CENTER | Age: 48
End: 2018-09-18
Attending: PHYSICIAN ASSISTANT
Payer: COMMERCIAL

## 2018-09-18 ENCOUNTER — OFFICE VISIT (OUTPATIENT)
Dept: MEDICAL GROUP | Facility: CLINIC | Age: 48
End: 2018-09-18
Payer: COMMERCIAL

## 2018-09-18 VITALS
HEIGHT: 65 IN | HEART RATE: 96 BPM | DIASTOLIC BLOOD PRESSURE: 84 MMHG | SYSTOLIC BLOOD PRESSURE: 128 MMHG | OXYGEN SATURATION: 99 % | RESPIRATION RATE: 16 BRPM | TEMPERATURE: 97 F | WEIGHT: 248 LBS | BODY MASS INDEX: 41.32 KG/M2

## 2018-09-18 DIAGNOSIS — Z01.419 WELL FEMALE EXAM WITH ROUTINE GYNECOLOGICAL EXAM: ICD-10-CM

## 2018-09-18 DIAGNOSIS — Z23 NEED FOR VACCINATION: ICD-10-CM

## 2018-09-18 DIAGNOSIS — Z12.4 SCREENING FOR MALIGNANT NEOPLASM OF CERVIX: ICD-10-CM

## 2018-09-18 PROCEDURE — 90686 IIV4 VACC NO PRSV 0.5 ML IM: CPT | Performed by: PHYSICIAN ASSISTANT

## 2018-09-18 PROCEDURE — 90471 IMMUNIZATION ADMIN: CPT | Performed by: PHYSICIAN ASSISTANT

## 2018-09-18 PROCEDURE — 99396 PREV VISIT EST AGE 40-64: CPT | Mod: 25 | Performed by: PHYSICIAN ASSISTANT

## 2018-09-18 PROCEDURE — 87624 HPV HI-RISK TYP POOLED RSLT: CPT

## 2018-09-18 PROCEDURE — 99000 SPECIMEN HANDLING OFFICE-LAB: CPT | Performed by: PHYSICIAN ASSISTANT

## 2018-09-18 PROCEDURE — 88175 CYTOPATH C/V AUTO FLUID REDO: CPT

## 2018-09-18 ASSESSMENT — PATIENT HEALTH QUESTIONNAIRE - PHQ9: CLINICAL INTERPRETATION OF PHQ2 SCORE: 0

## 2018-09-18 NOTE — PROGRESS NOTES
SUBJECTIVE: 48 y.o. female for routine pap and checkup.  Chief Complaint   Patient presents with   • Gynecologic Exam         Last Pap:   Contraception: sprintec  H/O Abnormal Pap no  H/O STI no  Current partner: male, monogamous     LMP: Patient's last menstrual period was 09/10/2018.    Allergies: Morphine     ROS:  Menses every month with mild cramping  Bleeding is inconsistent.    mild analgesics required during menses.  No menstrual depression, labile mood, anxiety, bloating/fluid retention, insomnia, migraine headaches, libido changes   no menopause symptoms of hot flashes, night sweats, sleep disruption, mood changes, vaginal dryness.   No pelvic pain, or dyspareunia. No abnormal  vaginal discharge   No breast tenderness, mass, nipple discharge, changes in size or contour, or abnormal cyclic discomfort.  No urinary tract symptoms, no incontinence.   No abdominal pain, change in bowel habits, black or bloody stools.    No unusual headaches, no visual changes.   No prolonged cough. No dyspnea or chest pain on exertion.    No skin lesions, concerns.     Preventive Care:  Mammogram  2018 Birads 2   DEXA none.   Colonoscopy - has cologuard  HPV vaccine not eligible    Current Outpatient Prescriptions   Medication Sig Dispense Refill   • hydrOXYzine HCl (ATARAX) 25 MG Tab   11   • cholestyramine (QUESTRAN) 4 g packet Take 6 g by mouth 3 times a day for 90 days. 240 Each 2   • phentermine (ADIPEX-P) 37.5 MG tablet Take 1 Tab by mouth every morning for 30 days. 30 Tab 0   • topiramate (TOPAMAX) 25 MG Tab TAKE 1 TABLET BY MOUTH TWO TIMES DAILY 90 Tab 1   • SPRINTEC 28 0.25-35 MG-MCG per tablet TAKE 1 TABLET BY MOUTH DAILY 28 Tab 11   • gabapentin (NEURONTIN) 300 MG Cap Take 2 Caps by mouth 2 Times a Day. 360 Cap 3   • citalopram (CELEXA) 40 MG Tab Take 1 Tab by mouth every day. 90 Tab 3   • cholecalciferol (VITAMIN D3) 5000 UNIT Cap Take 1 Cap by mouth every day. 30 Cap 5     No current  "facility-administered medications for this visit.      She  has a past medical history of Anxiety disorder (1/9/2012); Lumbar radiculitis (2/7/2012); Obesity (BMI 30-39.9) (1/4/2013); and S/P laparoscopic cholecystectomy (10/9/14). She also has no past medical history of Breast cancer (HCC).  She  has a past surgical history that includes breast biopsy and madhu by laparoscopy (10/9/2014).     Family History:   Family History   Problem Relation Age of Onset   • Hypertension Father    • Cancer Maternal Grandmother 50        breast cancer, cervical cancer and lung cancer   • Breast Cancer Maternal Grandmother    • Cancer Paternal Grandmother 60        breast cancer   • Breast Cancer Paternal Grandmother        Family History negative for : Colon cancer, thyroid disease,  osteoporosis.     OBJECTIVE:   /84   Pulse 96   Temp 36.1 °C (97 °F)   Resp 16   Ht 1.651 m (5' 5\")   Wt 112.5 kg (248 lb)   LMP 09/10/2018   SpO2 99%   BMI 41.27 kg/m²   Body mass index is 41.27 kg/m².      HEAD AND NECK:  Ears normal.  Throat, oral cavity and tongue normal.  Neck supple. No adenopathy or masses in the neck or supraclavicular regions.  No carotid bruits. No thyromegaly.   NEURO: Cranial nerves are normal. DTR's normal and symmetric.    CHEST:  Clear, good air entry, no wheezes or rales.   HEART:  Regular rate and rhythm.  S1 and S2 normal. No edema or JVD.   ABDOMEN:  Soft without tenderness, guarding, mass or organomegaly.  No CVA tenderness or inguinal adenopathy.   EXTREMITIES:  Extremities, reflexes and peripheral pulses are normal.    SKIN:  No rashes or suspicious skin lesions noted.     Breast Exam: Performed with instruction during examination. No axillary lymphadenopathy. No fixed or dominant masses. No nipple retraction or skin abnormalities.    PELVIC EXAMINATION    Chaperone Larisa Crain MA    Labia: normal, no lesions or erythema noted   Urethral Orifice: normal  Vagina: vaginal canal clear, no " lesions  Cervix: two clear-purple to grey polyps noted at the 7 and 5 o'clock positions around the os of the cervix.  No polyps, masses or lesions noted. thick white discharge noted.     BIMANUAL EXAM   Vaginal Walls: normal  Cervix: No CMT  Uterus: not able to palpate due to habitus.   Adnexa: not able to palpate due to habitus.   Rectal: not performed  Kegel muscles very weak to squeeze on bimanual exam.       <ASSESSMENT>  1. Screening for malignant neoplasm of cervix  ThinPrep Pap, w/HPV rflx to genotype   2. Need for vaccination  INFLUENZA VACCINE QUAD INJ >3Y(PF)   3. Well female exam with routine gynecological exam         Discussed breast self exam, mammography screening, use and side effects of HRT, menopause, osteoporosis and adequate intake of calcium and vitamin D   Follow-up in 3 years for next Gyn exam and Pap if all tests collected today are normal.

## 2018-09-19 ENCOUNTER — PATIENT MESSAGE (OUTPATIENT)
Dept: MEDICAL GROUP | Facility: CLINIC | Age: 48
End: 2018-09-19

## 2018-09-19 DIAGNOSIS — Z12.4 SCREENING FOR MALIGNANT NEOPLASM OF CERVIX: ICD-10-CM

## 2018-09-19 DIAGNOSIS — G56.03 BILATERAL CARPAL TUNNEL SYNDROME: ICD-10-CM

## 2018-09-19 NOTE — TELEPHONE ENCOUNTER
From: Lori Mendes  To: Renetta Lozoya P.A.-C.  Sent: 9/19/2018 10:49 AM PDT  Subject: Non-Urgent Medical Question    I forgot to ask for a referral for my carpal tunnel when I saw you Tuesday, September 18, 2018. It has been really bothering me. Thank you, Lori Mendes.

## 2018-09-20 LAB
CYTOLOGY REG CYTOL: NORMAL
HPV HR 12 DNA CVX QL NAA+PROBE: NEGATIVE
HPV16 DNA SPEC QL NAA+PROBE: NEGATIVE
HPV18 DNA SPEC QL NAA+PROBE: NEGATIVE
SPECIMEN SOURCE: NORMAL

## 2018-09-24 RX ORDER — HYDROXYZINE HYDROCHLORIDE 25 MG/1
TABLET, FILM COATED ORAL
Qty: 90 TAB | Refills: 11 | Status: SHIPPED | OUTPATIENT
Start: 2018-09-24 | End: 2019-08-01 | Stop reason: SDUPTHER

## 2018-09-24 NOTE — TELEPHONE ENCOUNTER
Was the patient seen in the last year in this department? Yes    Does patient have an active prescription for medications requested? Yes    Received Request Via: Patient   Last Appointment 9/18/18  Last Labs 9/15/17

## 2018-09-26 ENCOUNTER — APPOINTMENT (OUTPATIENT)
Dept: SCHEDULING | Facility: IMAGING CENTER | Age: 48
End: 2018-09-26
Payer: COMMERCIAL

## 2018-10-23 RX ORDER — TOPIRAMATE 25 MG/1
TABLET ORAL
Qty: 90 TAB | Refills: 1 | Status: SHIPPED | OUTPATIENT
Start: 2018-10-23 | End: 2019-01-09 | Stop reason: SDUPTHER

## 2018-11-26 ENCOUNTER — HOSPITAL ENCOUNTER (OUTPATIENT)
Dept: LAB | Facility: MEDICAL CENTER | Age: 48
End: 2018-11-26
Attending: INTERNAL MEDICINE
Payer: COMMERCIAL

## 2018-11-26 LAB
BASOPHILS # BLD AUTO: 1.2 % (ref 0–1.8)
BASOPHILS # BLD: 0.1 K/UL (ref 0–0.12)
EOSINOPHIL # BLD AUTO: 0.2 K/UL (ref 0–0.51)
EOSINOPHIL NFR BLD: 2.3 % (ref 0–6.9)
ERYTHROCYTE [DISTWIDTH] IN BLOOD BY AUTOMATED COUNT: 46.3 FL (ref 35.9–50)
HCT VFR BLD AUTO: 42.5 % (ref 37–47)
HGB BLD-MCNC: 13.6 G/DL (ref 12–16)
IMM GRANULOCYTES # BLD AUTO: 0.03 K/UL (ref 0–0.11)
IMM GRANULOCYTES NFR BLD AUTO: 0.3 % (ref 0–0.9)
LYMPHOCYTES # BLD AUTO: 3.18 K/UL (ref 1–4.8)
LYMPHOCYTES NFR BLD: 36.9 % (ref 22–41)
MCH RBC QN AUTO: 29.1 PG (ref 27–33)
MCHC RBC AUTO-ENTMCNC: 32 G/DL (ref 33.6–35)
MCV RBC AUTO: 91 FL (ref 81.4–97.8)
MONOCYTES # BLD AUTO: 0.44 K/UL (ref 0–0.85)
MONOCYTES NFR BLD AUTO: 5.1 % (ref 0–13.4)
NEUTROPHILS # BLD AUTO: 4.67 K/UL (ref 2–7.15)
NEUTROPHILS NFR BLD: 54.2 % (ref 44–72)
NRBC # BLD AUTO: 0 K/UL
NRBC BLD-RTO: 0 /100 WBC
PLATELET # BLD AUTO: 325 K/UL (ref 164–446)
PMV BLD AUTO: 12 FL (ref 9–12.9)
RBC # BLD AUTO: 4.67 M/UL (ref 4.2–5.4)
WBC # BLD AUTO: 8.6 K/UL (ref 4.8–10.8)

## 2018-11-26 PROCEDURE — 80053 COMPREHEN METABOLIC PANEL: CPT

## 2018-11-26 PROCEDURE — 85025 COMPLETE CBC W/AUTO DIFF WBC: CPT

## 2018-11-26 PROCEDURE — 84443 ASSAY THYROID STIM HORMONE: CPT

## 2018-11-26 PROCEDURE — 36415 COLL VENOUS BLD VENIPUNCTURE: CPT

## 2018-11-27 LAB
ALBUMIN SERPL BCP-MCNC: 4 G/DL (ref 3.2–4.9)
ALBUMIN/GLOB SERPL: 1.3 G/DL
ALP SERPL-CCNC: 65 U/L (ref 30–99)
ALT SERPL-CCNC: 15 U/L (ref 2–50)
ANION GAP SERPL CALC-SCNC: 9 MMOL/L (ref 0–11.9)
AST SERPL-CCNC: 13 U/L (ref 12–45)
BILIRUB SERPL-MCNC: 0.7 MG/DL (ref 0.1–1.5)
BUN SERPL-MCNC: 13 MG/DL (ref 8–22)
CALCIUM SERPL-MCNC: 9.2 MG/DL (ref 8.5–10.5)
CHLORIDE SERPL-SCNC: 105 MMOL/L (ref 96–112)
CO2 SERPL-SCNC: 24 MMOL/L (ref 20–33)
CREAT SERPL-MCNC: 0.74 MG/DL (ref 0.5–1.4)
FASTING STATUS PATIENT QL REPORTED: NORMAL
GLOBULIN SER CALC-MCNC: 3 G/DL (ref 1.9–3.5)
GLUCOSE SERPL-MCNC: 90 MG/DL (ref 65–99)
POTASSIUM SERPL-SCNC: 4.1 MMOL/L (ref 3.6–5.5)
PROT SERPL-MCNC: 7 G/DL (ref 6–8.2)
SODIUM SERPL-SCNC: 138 MMOL/L (ref 135–145)
TSH SERPL DL<=0.005 MIU/L-ACNC: 2.58 UIU/ML (ref 0.38–5.33)

## 2018-12-10 RX ORDER — TOPIRAMATE 25 MG/1
TABLET ORAL
Qty: 90 TAB | Refills: 0 | OUTPATIENT
Start: 2018-12-10

## 2018-12-11 NOTE — TELEPHONE ENCOUNTER
Was the patient seen in the last year in this department? Yes    Does patient have an active prescription for medications requested? Yes    Received Request Via: Pharmacy     Last Appointment 9/18/18  Last Labs 11/27/18

## 2019-01-10 RX ORDER — TOPIRAMATE 25 MG/1
TABLET ORAL
Qty: 90 TAB | Refills: 0 | Status: SHIPPED | OUTPATIENT
Start: 2019-01-10 | End: 2019-03-13 | Stop reason: SDUPTHER

## 2019-06-18 ENCOUNTER — HOSPITAL ENCOUNTER (OUTPATIENT)
Dept: RADIOLOGY | Facility: MEDICAL CENTER | Age: 49
End: 2019-06-18
Attending: INTERNAL MEDICINE
Payer: COMMERCIAL

## 2019-06-18 DIAGNOSIS — R10.13 ABDOMINAL PAIN, EPIGASTRIC: ICD-10-CM

## 2019-06-18 DIAGNOSIS — Z30.41 ENCOUNTER FOR BIRTH CONTROL PILLS MAINTENANCE: ICD-10-CM

## 2019-06-18 DIAGNOSIS — R11.0 NAUSEA: ICD-10-CM

## 2019-06-18 PROCEDURE — A9541 TC99M SULFUR COLLOID: HCPCS

## 2019-06-18 RX ORDER — NORGESTIMATE AND ETHINYL ESTRADIOL 0.25-0.035
KIT ORAL
Qty: 28 TAB | Refills: 2 | Status: SHIPPED | OUTPATIENT
Start: 2019-06-18 | End: 2019-08-01

## 2019-06-18 NOTE — TELEPHONE ENCOUNTER
Was the patient seen in the last year in this department? Yes    Does patient have an active prescription for medications requested? Yes    Received Request Via: Pharmacy    Hospital Outpatient Visit on 11/26/2018   Component Date Value   • WBC 11/26/2018 8.6    • RBC 11/26/2018 4.67    • Hemoglobin 11/26/2018 13.6    • Hematocrit 11/26/2018 42.5    • MCV 11/26/2018 91.0    • MCH 11/26/2018 29.1    • MCHC 11/26/2018 32.0*   • RDW 11/26/2018 46.3    • Platelet Count 11/26/2018 325    • MPV 11/26/2018 12.0    • Neutrophils-Polys 11/26/2018 54.20    • Lymphocytes 11/26/2018 36.90    • Monocytes 11/26/2018 5.10    • Eosinophils 11/26/2018 2.30    • Basophils 11/26/2018 1.20    • Immature Granulocytes 11/26/2018 0.30    • Nucleated RBC 11/26/2018 0.00    • Neutrophils (Absolute) 11/26/2018 4.67    • Lymphs (Absolute) 11/26/2018 3.18    • Monos (Absolute) 11/26/2018 0.44    • Eos (Absolute) 11/26/2018 0.20    • Baso (Absolute) 11/26/2018 0.10    • Immature Granulocytes (a* 11/26/2018 0.03    • NRBC (Absolute) 11/26/2018 0.00    • TSH 11/26/2018 2.580    • GFR If  11/26/2018 >60    • GFR If Non  Ameri* 11/26/2018 >60    • Sodium 11/26/2018 138    • Potassium 11/26/2018 4.1    • Chloride 11/26/2018 105    • Co2 11/26/2018 24    • Anion Gap 11/26/2018 9.0    • Glucose 11/26/2018 90    • Bun 11/26/2018 13    • Creatinine 11/26/2018 0.74    • Calcium 11/26/2018 9.2    • AST(SGOT) 11/26/2018 13    • ALT(SGPT) 11/26/2018 15    • Alkaline Phosphatase 11/26/2018 65    • Total Bilirubin 11/26/2018 0.7    • Albumin 11/26/2018 4.0    • Total Protein 11/26/2018 7.0    • Globulin 11/26/2018 3.0    • A-G Ratio 11/26/2018 1.3    • Fasting Status 11/26/2018 Unknown    Hospital Outpatient Visit on 09/18/2018   Component Date Value   • Cytology Reg 09/18/2018 See Path Report    • Source 09/18/2018 Cervical    • HPV Genotype 16 09/18/2018 Negative    • HPV Genotype 18 09/18/2018 Negative    • HPV Other High Risk Zina*  09/18/2018 Negative    ]

## 2019-07-17 ENCOUNTER — PATIENT MESSAGE (OUTPATIENT)
Dept: MEDICAL GROUP | Facility: CLINIC | Age: 49
End: 2019-07-17

## 2019-07-17 DIAGNOSIS — M54.16 LUMBAR RADICULITIS: ICD-10-CM

## 2019-07-17 RX ORDER — GABAPENTIN 300 MG/1
600 CAPSULE ORAL 2 TIMES DAILY
Qty: 360 CAP | Refills: 0 | Status: SHIPPED | OUTPATIENT
Start: 2019-07-17 | End: 2019-08-01 | Stop reason: SDUPTHER

## 2019-07-17 NOTE — TELEPHONE ENCOUNTER
From: Lori Mendes  To: Renetta Lozoya P.A.-C.  Sent: 7/17/2019 8:15 AM PDT  Subject: Prescription Question    I am out of refill's of my Gabapentin. Do I need to come in for a refill or can you request it for me over the phone.  Lori Mendes  [phone]

## 2019-08-01 ENCOUNTER — OFFICE VISIT (OUTPATIENT)
Dept: MEDICAL GROUP | Facility: CLINIC | Age: 49
End: 2019-08-01
Payer: COMMERCIAL

## 2019-08-01 VITALS
HEART RATE: 74 BPM | WEIGHT: 251 LBS | HEIGHT: 65 IN | SYSTOLIC BLOOD PRESSURE: 128 MMHG | TEMPERATURE: 97.7 F | DIASTOLIC BLOOD PRESSURE: 82 MMHG | RESPIRATION RATE: 14 BRPM | BODY MASS INDEX: 41.82 KG/M2 | OXYGEN SATURATION: 94 %

## 2019-08-01 DIAGNOSIS — F33.0 MILD EPISODE OF RECURRENT MAJOR DEPRESSIVE DISORDER (HCC): ICD-10-CM

## 2019-08-01 DIAGNOSIS — M54.16 LUMBAR RADICULITIS: ICD-10-CM

## 2019-08-01 DIAGNOSIS — E66.01 CLASS 3 SEVERE OBESITY DUE TO EXCESS CALORIES WITHOUT SERIOUS COMORBIDITY WITH BODY MASS INDEX (BMI) OF 40.0 TO 44.9 IN ADULT (HCC): ICD-10-CM

## 2019-08-01 PROCEDURE — 99213 OFFICE O/P EST LOW 20 MIN: CPT | Performed by: PHYSICIAN ASSISTANT

## 2019-08-01 RX ORDER — CITALOPRAM 40 MG/1
40 TABLET ORAL DAILY
Qty: 90 TAB | Refills: 3 | Status: SHIPPED
Start: 2019-08-01 | End: 2020-01-16

## 2019-08-01 RX ORDER — GABAPENTIN 300 MG/1
600 CAPSULE ORAL 2 TIMES DAILY
Qty: 360 CAP | Refills: 3 | Status: SHIPPED | OUTPATIENT
Start: 2019-08-01 | End: 2020-05-06 | Stop reason: SDUPTHER

## 2019-08-01 RX ORDER — HYDROXYZINE HYDROCHLORIDE 25 MG/1
25 TABLET, FILM COATED ORAL 3 TIMES DAILY PRN
Qty: 90 TAB | Refills: 3 | Status: ON HOLD
Start: 2019-08-01 | End: 2020-06-11

## 2019-08-01 NOTE — PROGRESS NOTES
Chief Complaint   Patient presents with   • Depression   • Back Pain       HISTORY OF PRESENT ILLNESS: Patient is a 49 y.o. female established patient who presents today for evaluation and management of:    Class 3 severe obesity due to excess calories without serious comorbidity with body mass index (BMI) of 40.0 to 44.9 in adult ([facility])  This is a chronic condition.  This patient has tried phentermine in the past and is now exploring ideas regarding her gut bacteria.    Lumbar radiculitis  This is a chronic condition that involves bilateral lower back and leg pain and remains well controlled on gabapentin 600 mg twice per day.  This patient denies adverse side effects from this medication and is requesting refills today.    Mild episode of recurrent major depressive disorder ([facility])  This is a chronic condition that remains well controlled on Celexa 40 mg tablets daily and hydroxyzine 25 mg tablets as needed.  This patient states she recently broke up with her boyfriend and is feeling good about this at this time.  She denies adverse side effects and is requesting refills at this time.       Patient Active Problem List    Diagnosis Date Noted   • Insomnia 08/09/2017   • Dumping syndrome 08/09/2017   • Mild episode of recurrent major depressive disorder ([facility]) 09/01/2015   • S/P cholecystectomy 10/09/2014   • Class 3 severe obesity due to excess calories without serious comorbidity with body mass index (BMI) of 40.0 to 44.9 in adult ([facility]) 01/04/2013   • Vitamin D deficiency 02/07/2012   • Lumbar radiculitis 02/07/2012   • Anxiety disorder 01/09/2012       Allergies:Morphine    Current Outpatient Medications   Medication Sig Dispense Refill   • riFAXIMin (XIFAXAN) 550 MG Tab tablet Take 550 mg by mouth 2 times a day as needed for Other. For stomach upset     • gabapentin (NEURONTIN) 300 MG Cap Take 2 Caps by mouth 2 Times a Day. 360 Cap 3   • hydrOXYzine HCl (ATARAX) 25 MG Tab Take 1 Tab by mouth 3 times a day as needed.  90 Tab 3   • citalopram (CELEXA) 40 MG Tab Take 1 Tab by mouth every day. 90 Tab 3   • cholecalciferol (VITAMIN D3) 5000 UNIT Cap Take 1 Cap by mouth every day. (Patient not taking: Reported on 2019) 30 Cap 5     No current facility-administered medications for this visit.        Social History     Tobacco Use   • Smoking status: Never Smoker   • Smokeless tobacco: Former User     Types: Chew   • Tobacco comment: unable to quantify chewing   Substance Use Topics   • Alcohol use: No     Alcohol/week: 0.0 oz   • Drug use: No       Family Status   Relation Name Status   • Mo  Alive        64 in    • Fa  Alive        65 in    • MGMo     • PGMo       Family History   Problem Relation Age of Onset   • Hypertension Father    • Cancer Maternal Grandmother 50        breast cancer, cervical cancer and lung cancer   • Breast Cancer Maternal Grandmother    • Cancer Paternal Grandmother 60        breast cancer   • Breast Cancer Paternal Grandmother    • Diabetes Paternal Grandmother    • Osteoporosis Paternal Grandmother        Review of Systems: See HPI above.   Constitutional: Negative for fever, chills, weight loss and malaise.   HENT: Negative for ear pain, nosebleeds, congestion, sore throat and neck pain.    Eyes: Negative for blurred vision.   Respiratory: Negative for shortness of breath, cough, sputum production and wheezing.    Cardiovascular: Negative for chest pain, palpitations, orthopnea and leg swelling.   Gastrointestinal: Negative for heartburn, nausea, vomiting and abdominal pain.   Genitourinary: Negative for dysuria, urgency and frequency.   Musculoskeletal: Negative for myalgias, back pain and joint pain.   Skin: Negative for rash and itching.   Neurological: Negative for dizziness, tingling, tremors, sensory change, focal weakness and headaches.   Endo/Heme/Allergies: Does not bruise/bleed easily.   Psychiatric/Behavioral: Positive for well-controlled depression without suicidal  "ideas and memory loss.  The patient is not nervous/anxious and does not have insomnia.      Exam:  /82   Pulse 74   Temp 36.5 °C (97.7 °F) (Temporal)   Resp 14   Ht 1.651 m (5' 5\")   Wt 113.9 kg (251 lb)   SpO2 94%   Body mass index is 41.77 kg/m².  General:  Obese female in NAD  Head: is grossly normal.  Neck: Supple without masses. Thyroid is not visibly enlarged.  Pulmonary: Clear to ausculation. Normal effort. No rales, ronchi, or wheezing.  Cardiovascular: Regular rate and rhythm without murmur. Carotid pulses are intact and equal bilaterally.  Extremities: no clubbing, cyanosis, or edema.  Behavioral: Normal mood and affect today.  Judgment and insight is normal.    Medical decision-making and discussion:  1. Lumbar radiculitis    - gabapentin (NEURONTIN) 300 MG Cap; Take 2 Caps by mouth 2 Times a Day.  Dispense: 360 Cap; Refill: 3    2. Class 3 severe obesity due to excess calories without serious comorbidity with body mass index (BMI) of 40.0 to 44.9 in adult (HCC)    - Patient identified as having weight management issue.  Appropriate orders and counseling given.    3. Mild episode of recurrent major depressive disorder (HCC)    - hydrOXYzine HCl (ATARAX) 25 MG Tab; Take 1 Tab by mouth 3 times a day as needed.  Dispense: 90 Tab; Refill: 3  - citalopram (CELEXA) 40 MG Tab; Take 1 Tab by mouth every day.  Dispense: 90 Tab; Refill: 3      Please note that this dictation was created using voice recognition software. I have made every reasonable attempt to correct obvious errors, but I expect that there are errors of grammar and possibly content that I did not discover before finalizing the note.      Return in about 6 months (around 2/1/2020) for Chronic conditions.  "

## 2019-08-01 NOTE — ASSESSMENT & PLAN NOTE
This is a chronic condition.  This patient has tried phentermine in the past and is now exploring ideas regarding her gut bacteria.

## 2019-08-01 NOTE — ASSESSMENT & PLAN NOTE
This is a chronic condition that involves bilateral lower back and leg pain and remains well controlled on gabapentin 600 mg twice per day.  This patient denies adverse side effects from this medication and is requesting refills today.

## 2019-08-01 NOTE — ASSESSMENT & PLAN NOTE
This is a chronic condition that remains well controlled on Celexa 40 mg tablets daily and hydroxyzine 25 mg tablets as needed.  This patient states she recently broke up with her boyfriend and is feeling good about this at this time.  She denies adverse side effects and is requesting refills at this time.

## 2019-12-05 ENCOUNTER — OFFICE VISIT (OUTPATIENT)
Dept: MEDICAL GROUP | Facility: CLINIC | Age: 49
End: 2019-12-05
Payer: COMMERCIAL

## 2019-12-05 VITALS
HEART RATE: 67 BPM | BODY MASS INDEX: 41.15 KG/M2 | HEIGHT: 65 IN | RESPIRATION RATE: 14 BRPM | OXYGEN SATURATION: 97 % | DIASTOLIC BLOOD PRESSURE: 82 MMHG | TEMPERATURE: 98.2 F | WEIGHT: 247 LBS | SYSTOLIC BLOOD PRESSURE: 128 MMHG

## 2019-12-05 DIAGNOSIS — Z30.41 ENCOUNTER FOR BIRTH CONTROL PILLS MAINTENANCE: ICD-10-CM

## 2019-12-05 DIAGNOSIS — Z12.11 SCREENING FOR COLORECTAL CANCER: ICD-10-CM

## 2019-12-05 DIAGNOSIS — Z13.6 SCREENING FOR CARDIOVASCULAR CONDITION: ICD-10-CM

## 2019-12-05 DIAGNOSIS — F33.0 MILD EPISODE OF RECURRENT MAJOR DEPRESSIVE DISORDER (HCC): ICD-10-CM

## 2019-12-05 DIAGNOSIS — Z12.12 SCREENING FOR COLORECTAL CANCER: ICD-10-CM

## 2019-12-05 DIAGNOSIS — R19.7 DIARRHEA IN ADULT PATIENT: ICD-10-CM

## 2019-12-05 PROCEDURE — 99214 OFFICE O/P EST MOD 30 MIN: CPT | Performed by: PHYSICIAN ASSISTANT

## 2019-12-05 RX ORDER — NORGESTIMATE AND ETHINYL ESTRADIOL 0.25-0.035
KIT ORAL
Qty: 84 TAB | Refills: 0 | Status: ON HOLD
Start: 2019-12-05 | End: 2020-01-14

## 2019-12-05 RX ORDER — CITALOPRAM 20 MG/1
30 TABLET ORAL DAILY
Qty: 135 TAB | Refills: 1 | Status: SHIPPED | OUTPATIENT
Start: 2019-12-05 | End: 2020-01-16 | Stop reason: SDUPTHER

## 2019-12-05 ASSESSMENT — PATIENT HEALTH QUESTIONNAIRE - PHQ9
1. LITTLE INTEREST OR PLEASURE IN DOING THINGS: MORE THAN HALF THE DAYS
5. POOR APPETITE OR OVEREATING: MORE THAN HALF THE DAYS
SUM OF ALL RESPONSES TO PHQ9 QUESTIONS 1 AND 2: 4
2. FEELING DOWN, DEPRESSED, IRRITABLE, OR HOPELESS: MORE THAN HALF THE DAYS
9. THOUGHTS THAT YOU WOULD BE BETTER OFF DEAD, OR OF HURTING YOURSELF: NOT AT ALL
7. TROUBLE CONCENTRATING ON THINGS, SUCH AS READING THE NEWSPAPER OR WATCHING TELEVISION: SEVERAL DAYS
8. MOVING OR SPEAKING SO SLOWLY THAT OTHER PEOPLE COULD HAVE NOTICED. OR THE OPPOSITE, BEING SO FIGETY OR RESTLESS THAT YOU HAVE BEEN MOVING AROUND A LOT MORE THAN USUAL: NOT AT ALL
SUM OF ALL RESPONSES TO PHQ QUESTIONS 1-9: 13
4. FEELING TIRED OR HAVING LITTLE ENERGY: NEARLY EVERY DAY
3. TROUBLE FALLING OR STAYING ASLEEP OR SLEEPING TOO MUCH: MORE THAN HALF THE DAYS
6. FEELING BAD ABOUT YOURSELF - OR THAT YOU ARE A FAILURE OR HAVE LET YOURSELF OR YOUR FAMILY DOWN: SEVERAL DAYS

## 2019-12-05 NOTE — ASSESSMENT & PLAN NOTE
This patient has been getting annual mammograms, as requested and feels her hormones have been superbly regulated on her current hormone replacement regimen. She hasn't had this in over 2 months and is starting to feel this. She is requesting refills at this time.

## 2019-12-05 NOTE — PROGRESS NOTES
Chief Complaint   Patient presents with   • Depression       HISTORY OF PRESENT ILLNESS: Patient is a 49 y.o. female established patient who presents today for evaluation and management of:    Mild episode of recurrent major depressive disorder (HCC)  This is a chronic condition that remains well controlled on Celexa 40 mg tablets daily and hydroxyzine 25 mg tablets as needed.  This patient states she recently broke up with her boyfriend and is feeling good about this at this time.  She denies adverse side effects and is requesting refills at this time.     Encounter for birth control pills maintenance  This patient has been getting annual mammograms, as requested and feels her hormones have been superbly regulated on her current hormone replacement regimen. She hasn't had this in over 2 months and is starting to feel this. She is requesting refills at this time.     Diarrhea in adult patient  This patient has been using occasional rifaximin 550mg tab for small intestine bacteria overgrowth which has been causing diarrhea. She is requesting refills for this at this time as she is starting to have return of symptoms.        Patient Active Problem List    Diagnosis Date Noted   • Encounter for birth control pills maintenance 12/05/2019   • Diarrhea in adult patient 12/05/2019   • Insomnia 08/09/2017   • Dumping syndrome 08/09/2017   • Mild episode of recurrent major depressive disorder (HCC) 09/01/2015   • S/P cholecystectomy 10/09/2014   • Class 3 severe obesity due to excess calories without serious comorbidity with body mass index (BMI) of 40.0 to 44.9 in adult ([facility]) 01/04/2013   • Vitamin D deficiency 02/07/2012   • Lumbar radiculitis 02/07/2012   • Anxiety disorder 01/09/2012       Allergies:Morphine    Current Outpatient Medications   Medication Sig Dispense Refill   • riFAXIMin (XIFAXAN) 550 MG Tab tablet Take 1 Tab by mouth 2 times a day as needed. For stomach upset 60 Tab 2   • citalopram (CELEXA) 20 MG Tab  Take 1.5 Tabs by mouth every day. 135 Tab 1   • norgestimate-ethinyl estradiol (SPRINTEC 28) 0.25-35 MG-MCG per tablet TAKE 1 TABLET BY MOUTH DAILY 84 Tab 0   • gabapentin (NEURONTIN) 300 MG Cap Take 2 Caps by mouth 2 Times a Day. 360 Cap 3   • hydrOXYzine HCl (ATARAX) 25 MG Tab Take 1 Tab by mouth 3 times a day as needed. 90 Tab 3   • citalopram (CELEXA) 40 MG Tab Take 1 Tab by mouth every day. 90 Tab 3     No current facility-administered medications for this visit.        Social History     Tobacco Use   • Smoking status: Never Smoker   • Smokeless tobacco: Former User     Types: Chew   • Tobacco comment: unable to quantify chewing   Substance Use Topics   • Alcohol use: No     Alcohol/week: 0.0 oz   • Drug use: No       Family Status   Relation Name Status   • Mo  Alive        64 in    • Fa  Alive        65 in    • MGMo     • PGMo       Family History   Problem Relation Age of Onset   • Hypertension Father    • Cancer Maternal Grandmother 50        breast cancer, cervical cancer and lung cancer   • Breast Cancer Maternal Grandmother    • Cancer Paternal Grandmother 60        breast cancer   • Breast Cancer Paternal Grandmother    • Diabetes Paternal Grandmother    • Osteoporosis Paternal Grandmother        Review of Systems: See HPI above.  Constitutional: Negative for fever, chills, weight loss and malaise. Positive for lethargy   HENT: Negative for ear pain, nosebleeds, congestion, sore throat and neck pain.    Eyes: Negative for blurred vision.   Respiratory: Negative for shortness of breath, cough, sputum production and wheezing.    Cardiovascular: Negative for chest pain, palpitations, orthopnea and leg swelling.   Gastrointestinal: Negative for heartburn, nausea, vomiting and abdominal pain.   Genitourinary: Negative for dysuria, urgency and frequency.   Neurological: Negative for dizziness, tingling, tremors, sensory change, focal weakness and headaches.   Endo/Heme/Allergies: Does  "not bruise/bleed easily.   Psychiatric/Behavioral: positive for depression without suicidal ideas and memory loss.  The patient is not nervous/anxious and does not have insomnia.      Exam:  /82 (BP Location: Right arm, Patient Position: Sitting, BP Cuff Size: Large adult)   Pulse 67   Temp 36.8 °C (98.2 °F) (Temporal)   Resp 14   Ht 1.651 m (5' 5\")   Wt 112 kg (247 lb)   SpO2 97%   Body mass index is 41.1 kg/m².  General:  Obese female in NAD  Head: is grossly normal.  Neck: Supple without masses. Thyroid is not visibly enlarged.  Pulmonary: Clear to ausculation. Normal effort. No rales, ronchi, or wheezing.  Cardiovascular: Regular rate and rhythm without murmur. Carotid pulses are intact and equal bilaterally.  Extremities: no clubbing, cyanosis, or edema.    Medical decision-making and discussion:  1. Encounter for birth control pills maintenance    - norgestimate-ethinyl estradiol (SPRINTEC 28) 0.25-35 MG-MCG per tablet; TAKE 1 TABLET BY MOUTH DAILY  Dispense: 84 Tab; Refill: 0    2. Diarrhea in adult patient    - riFAXIMin (XIFAXAN) 550 MG Tab tablet; Take 1 Tab by mouth 2 times a day as needed. For stomach upset  Dispense: 60 Tab; Refill: 2  - Comp Metabolic Panel; Future  - ESTIMATED GFR; Future    3. Screening for cardiovascular condition    - Lipid Profile; Future    4. Mild episode of recurrent major depressive disorder (HCC)    - citalopram (CELEXA) 20 MG Tab; Take 1.5 Tabs by mouth every day.  Dispense: 135 Tab; Refill: 1  - Comp Metabolic Panel; Future  - ESTIMATED GFR; Future  - CBC WITH DIFFERENTIAL; Future  - TSH; Future    5. Screening for colorectal cancer    - OCCULT BLOOD FECES IMMUNOASSAY (FIT); Future      Please note that this dictation was created using voice recognition software. I have made every reasonable attempt to correct obvious errors, but I expect that there are errors of grammar and possibly content that I did not discover before finalizing the note.      Return in " about 6 months (around 6/5/2020).

## 2019-12-05 NOTE — ASSESSMENT & PLAN NOTE
This patient has been using occasional rifaximin 550mg tab for small intestine bacteria overgrowth which has been causing diarrhea. She is requesting refills for this at this time as she is starting to have return of symptoms.

## 2019-12-12 ENCOUNTER — TELEPHONE (OUTPATIENT)
Dept: MEDICAL GROUP | Facility: PHYSICIAN GROUP | Age: 49
End: 2019-12-12

## 2019-12-12 NOTE — TELEPHONE ENCOUNTER
MEDICATION PRIOR AUTHORIZATION:    1. Name of Medication: riFAXIMin (XIFAXAN) 550 MG Tab tablet    2. Requested By (Name of Pharmacy): [facility]     3. Is insurance on file current? yes    4. What is the name & phone number of the 3rd party payor?   [employer]   Key: francie

## 2019-12-18 ENCOUNTER — HOSPITAL ENCOUNTER (OUTPATIENT)
Dept: RADIOLOGY | Facility: MEDICAL CENTER | Age: 49
End: 2019-12-18
Attending: PHYSICIAN ASSISTANT
Payer: COMMERCIAL

## 2019-12-18 DIAGNOSIS — Z12.31 VISIT FOR SCREENING MAMMOGRAM: ICD-10-CM

## 2019-12-18 DIAGNOSIS — R92.8 ABNORMAL MAMMOGRAM OF LEFT BREAST: ICD-10-CM

## 2019-12-18 PROCEDURE — 77063 BREAST TOMOSYNTHESIS BI: CPT

## 2019-12-18 NOTE — PROGRESS NOTES
Called patient to inform her of her abnormal mammogram. Ultrasound is ordered. Patient will schedule to have this ultrasound completed as soon as possible.

## 2019-12-19 ENCOUNTER — HOSPITAL ENCOUNTER (OUTPATIENT)
Dept: RADIOLOGY | Facility: MEDICAL CENTER | Age: 49
End: 2019-12-19
Attending: PHYSICIAN ASSISTANT
Payer: COMMERCIAL

## 2019-12-19 DIAGNOSIS — C50.419: ICD-10-CM

## 2019-12-19 DIAGNOSIS — R92.8 ABNORMAL MAMMOGRAM OF LEFT BREAST: ICD-10-CM

## 2019-12-19 LAB — PATHOLOGY CONSULT NOTE: NORMAL

## 2019-12-19 PROCEDURE — 88305 TISSUE EXAM BY PATHOLOGIST: CPT

## 2019-12-19 PROCEDURE — 76642 ULTRASOUND BREAST LIMITED: CPT | Mod: LT

## 2019-12-19 PROCEDURE — 19285 PERQ DEV BREAST 1ST US IMAG: CPT | Mod: LT

## 2019-12-19 PROCEDURE — 88360 TUMOR IMMUNOHISTOCHEM/MANUAL: CPT

## 2019-12-19 PROCEDURE — 306637 US-LOCALIZATION BREAST SINGLE

## 2019-12-19 PROCEDURE — 76942 ECHO GUIDE FOR BIOPSY: CPT

## 2019-12-19 PROCEDURE — 19286 PERQ DEV BREAST ADD US IMAG: CPT

## 2019-12-19 PROCEDURE — 10035 PLMT SFT TISS LOCLZJ DEV 1ST: CPT

## 2019-12-19 PROCEDURE — 19083 BX BREAST 1ST LESION US IMAG: CPT

## 2019-12-19 NOTE — PROGRESS NOTES
conversation with radiologist this morning about probable malignancy of breast. Referrals placed now. Patient is already scheduled for biopsy of neoplasm and lymph node.

## 2019-12-20 ENCOUNTER — TELEPHONE (OUTPATIENT)
Dept: RADIOLOGY | Facility: MEDICAL CENTER | Age: 49
End: 2019-12-20

## 2019-12-20 ENCOUNTER — PATIENT MESSAGE (OUTPATIENT)
Dept: MEDICAL GROUP | Facility: CLINIC | Age: 49
End: 2019-12-20

## 2019-12-23 NOTE — TELEPHONE ENCOUNTER
From: Lori Mendes  To: Renetta Lozoya P.A.-C.  Sent: 12/20/2019 12:16 PM PST  Subject: Non-Urgent Medical Question    I have scheduled my appt with  and have a call into the [facility] ; however, I don't have my results back from the biopsy and am a little confused. They didn't really say much yesterday except they thought it was 90% cancerous. Do you have further information, or are we still just waiting.  Thank you,  Bita Mendes

## 2019-12-27 ENCOUNTER — PATIENT OUTREACH (OUTPATIENT)
Dept: HEALTH INFORMATION MANAGEMENT | Facility: OTHER | Age: 49
End: 2019-12-27

## 2019-12-27 ENCOUNTER — PATIENT MESSAGE (OUTPATIENT)
Dept: HEALTH INFORMATION MANAGEMENT | Facility: OTHER | Age: 49
End: 2019-12-27

## 2019-12-27 NOTE — PROGRESS NOTES
Oncology Nurse Navigation   Referral received from Dr. Najera.  Phoned pt for f/u.  Discussed role of ONN and sent intro letter via pt portal.  Pt states she is awaiting a consult with a plastic surgeon as well as genetic testing.  She works full time and is the sole income for the household.  She lives with her son who is 18 and will be joining the  soon.  Her deductible will reset at the beginning of the year.  She can anticipate at least a week off of work and does not have any paid time off.  Discussed potential resources should finances become a concern.  Pt grateful for information and will call ONN with update on POC.

## 2019-12-30 ENCOUNTER — PATIENT MESSAGE (OUTPATIENT)
Dept: MEDICAL GROUP | Facility: CLINIC | Age: 49
End: 2019-12-30

## 2019-12-30 DIAGNOSIS — C50.419: ICD-10-CM

## 2020-01-01 NOTE — PROGRESS NOTES
"  Non face to face prolonged services of clinical data and chart information reviewed for a total time of 30 performed on 1/1 for Lori Mendes    Reviewed were:    Referral    Previous encounters        Imaging all breast imaging including mammography, CT/tomography and staging MRI/PET    Previous procedures biopsy and surgical procedures including pathology studies and interpretation        Notes C50.919 (ICD-10-CM) - Breast cancer (HCC)    Media family history and germline molecular studies on family members    Miscellaneous reports    Patient summaries family history as documented by referring clinician        Counseling, testing information and materials prepared    \"I spent 30 minutes  from 1300 to 1330 reviewing past records, prior to visit pertaining to genetic risk.\"     Dagoberto Raymundo M.D.  edited  "

## 2020-01-02 ENCOUNTER — OFFICE VISIT (OUTPATIENT)
Dept: HEMATOLOGY ONCOLOGY | Facility: MEDICAL CENTER | Age: 50
End: 2020-01-02
Payer: COMMERCIAL

## 2020-01-02 VITALS
OXYGEN SATURATION: 94 % | TEMPERATURE: 98.9 F | HEART RATE: 88 BPM | HEIGHT: 66 IN | RESPIRATION RATE: 18 BRPM | SYSTOLIC BLOOD PRESSURE: 126 MMHG | BODY MASS INDEX: 39.58 KG/M2 | DIASTOLIC BLOOD PRESSURE: 86 MMHG | WEIGHT: 246.25 LBS

## 2020-01-02 DIAGNOSIS — E55.9 VITAMIN D DEFICIENCY: ICD-10-CM

## 2020-01-02 DIAGNOSIS — E66.01 CLASS 3 SEVERE OBESITY DUE TO EXCESS CALORIES WITHOUT SERIOUS COMORBIDITY WITH BODY MASS INDEX (BMI) OF 40.0 TO 44.9 IN ADULT (HCC): ICD-10-CM

## 2020-01-02 DIAGNOSIS — C50.412 MALIGNANT NEOPLASM OF UPPER-OUTER QUADRANT OF LEFT FEMALE BREAST, UNSPECIFIED ESTROGEN RECEPTOR STATUS (HCC): ICD-10-CM

## 2020-01-02 DIAGNOSIS — C50.419: ICD-10-CM

## 2020-01-02 DIAGNOSIS — M54.16 LUMBAR RADICULITIS: ICD-10-CM

## 2020-01-02 DIAGNOSIS — Z90.49 S/P CHOLECYSTECTOMY: ICD-10-CM

## 2020-01-02 DIAGNOSIS — Z80.3 FAMILY HISTORY OF BREAST CANCER: ICD-10-CM

## 2020-01-02 DIAGNOSIS — K91.1 DUMPING SYNDROME: ICD-10-CM

## 2020-01-02 DIAGNOSIS — Z30.41 ENCOUNTER FOR BIRTH CONTROL PILLS MAINTENANCE: ICD-10-CM

## 2020-01-02 DIAGNOSIS — F33.0 MILD EPISODE OF RECURRENT MAJOR DEPRESSIVE DISORDER (HCC): ICD-10-CM

## 2020-01-02 DIAGNOSIS — R19.7 DIARRHEA IN ADULT PATIENT: ICD-10-CM

## 2020-01-02 PROCEDURE — 99203 OFFICE O/P NEW LOW 30 MIN: CPT | Performed by: MEDICAL GENETICS

## 2020-01-02 RX ORDER — HYDROXYZINE HYDROCHLORIDE 25 MG/1
25 TABLET, FILM COATED ORAL 3 TIMES DAILY PRN
COMMUNITY
End: 2020-01-16

## 2020-01-02 NOTE — NON-PROVIDER
Instructed patient on how to obtain a saliva specimen for [employer] to send off for genetic testing.     The retrieval of saliva specimen was  successful and sent out 01/02/2020. Patient verbalized understanding

## 2020-01-02 NOTE — PROGRESS NOTES
GENETIC RISK ASSESSMENT    Lori Mendes is a 49 y.o. year old patient who was referred by [facility] for cancer genetic risk assessment.    Chief Complaint: breast cancer and family history of breast cancer    HPI: The patient was well until 3 weeks ago at which time a suspicious physical exam caused the patient to be referred for imaging ). A suspicious (mammogram study identified a (left) breast mass. A biopsy  was performed and a specimen was submitted to pathology.     A diagnosis of  carcinoma was made.   The patient's paternal and maternal grandmothers: breast cancer age 50  Review of personal and family histories suggested that a cancer genetic risk assessment was in order.    Review of Systems (ROS):  Constitutional N  Eyes N  ENT N   Respiratory N  Cardiovascular N  Gastrointestinal N  Genitourinary N  Musculoskeletal N  Skin N  Neurological N  Endocrine N  Psychiatric N  Hematologic/lymphatic N  Allergic/Immunologic none  All other systems reviewed and are negative.    History (Past/Family)  Family History:   Family History   Problem Relation Age of Onset   • Hypertension Father    • Cancer Maternal Grandmother 50        breast cancer, cervical cancer and lung cancer   • Breast Cancer Maternal Grandmother    • Cancer Paternal Grandmother 60        breast cancer   • Breast Cancer Paternal Grandmother    • Diabetes Paternal Grandmother    • Osteoporosis Paternal Grandmother       3 generation pedigree built   Yes   Kayleneer-Cira empiric risk calculation No   Silvestre II empiric risk calculation  No    Social History:       Social History     Socioeconomic History   • Marital status:      Spouse name: Not on file   • Number of children: Not on file   • Years of education: Not on file   • Highest education level: Not on file   Occupational History   • Occupation: self employed.      Employer: OTHER   Social Needs   • Financial resource strain: Not on file   • Food insecurity:     Worry: Not on file      Inability: Not on file   • Transportation needs:     Medical: Not on file     Non-medical: Not on file   Tobacco Use   • Smoking status: Never Smoker   • Smokeless tobacco: Former User     Types: Chew   • Tobacco comment: unable to quantify chewing   Substance and Sexual Activity   • Alcohol use: No     Alcohol/week: 0.0 oz   • Drug use: No   • Sexual activity: Never     Partners: Male   Lifestyle   • Physical activity:     Days per week: Not on file     Minutes per session: Not on file   • Stress: Not on file   Relationships   • Social connections:     Talks on phone: Not on file     Gets together: Not on file     Attends Orthodox service: Not on file     Active member of club or organization: Not on file     Attends meetings of clubs or organizations: Not on file     Relationship status: Not on file   • Intimate partner violence:     Fear of current or ex partner: Not on file     Emotionally abused: Not on file     Physically abused: Not on file     Forced sexual activity: Not on file   Other Topics Concern   •  Service Not Asked   • Blood Transfusions Not Asked   • Caffeine Concern Not Asked   • Occupational Exposure Not Asked   • Hobby Hazards Not Asked   • Sleep Concern Not Asked   • Stress Concern Not Asked   • Weight Concern Not Asked   • Special Diet Not Asked   • Back Care Not Asked   • Exercise Yes     Comment: daily workouts at the gym   • Bike Helmet No   • Seat Belt Yes   • Self-Exams Yes   Social History Narrative    Single, has son.        Patient Past History:  Past Medical History:   Diagnosis Date   • Anxiety disorder 1/9/2012   • Lumbar radiculitis 2/7/2012   • Malig neoplasm of upper-outer quadrant of unsp female breast (HCC) 12/19/2019   • Obesity (BMI 30-39.9) 1/4/2013   • S/P laparoscopic cholecystectomy 10/9/14           NCCN Testing Criteria Met                            Yes    Physical Exam  Constitutional    There were no vitals taken for this visit.        General appearance:  normal   Head Circumference:   ([place])  Eyes    Conjunctiva: clear   Pupils: reactive     ENMT    External inspection: normal   Assessment of Hearing: noraml   Lips/cheeks/gums: no lesons  Neck   External exam: normla   Thyroid: normla  Respiratory   Auscultation: clear    Cardiovascular   Auscultation: RRR   Edema : none  Chest   Breasts (exam): not done   Palpation:   GI   External exam and palpation: normal     Pelvic (female): not done   External exam (male):   Lymphatic   Palpation in 2 areas: normal    Musculoskeletal   Gait: normal   Digits and Nails: no lesions  Skin   Inspection: normal   Palpation: no masses                  Fibrofolliculoma: absent                  Trichodiscoma: absent                   Akrochordon: absent                   CAfe-au-lait:  absent                  Hypopigmentation: absent                  Mucosal freckling:  absent  Neurologic   Cranial nerves: intact   DTR’s: 2+       Assessment and Plan:  Patient with recent diagnosis of breast cancer (age 49) and family history of breast cancer     I spent a total of 40 minutes of face to face time with >50% of time spent in counseling and coordination of care discussing matters stated in above note.    Kurtis panel ordered      Dagoberto Raymundo M.D.

## 2020-01-10 ENCOUNTER — HOSPITAL ENCOUNTER (OUTPATIENT)
Dept: RADIOLOGY | Facility: MEDICAL CENTER | Age: 50
End: 2020-01-10
Attending: SURGERY
Payer: COMMERCIAL

## 2020-01-10 DIAGNOSIS — C50.412 MALIGNANT NEOPLASM OF UPPER-OUTER QUADRANT OF LEFT FEMALE BREAST, UNSPECIFIED ESTROGEN RECEPTOR STATUS (HCC): ICD-10-CM

## 2020-01-10 PROCEDURE — 700117 HCHG RX CONTRAST REV CODE 255: Performed by: SURGERY

## 2020-01-10 PROCEDURE — 71260 CT THORAX DX C+: CPT

## 2020-01-10 PROCEDURE — A9576 INJ PROHANCE MULTIPACK: HCPCS | Performed by: SURGERY

## 2020-01-10 PROCEDURE — A9503 TC99M MEDRONATE: HCPCS

## 2020-01-10 PROCEDURE — C8908 MRI W/O FOL W/CONT, BREAST,: HCPCS

## 2020-01-10 RX ADMIN — IOHEXOL 25 ML: 240 INJECTION, SOLUTION INTRATHECAL; INTRAVASCULAR; INTRAVENOUS; ORAL at 16:38

## 2020-01-10 RX ADMIN — GADOTERIDOL 20 ML: 279.3 INJECTION, SOLUTION INTRAVENOUS at 09:27

## 2020-01-10 RX ADMIN — IOHEXOL 100 ML: 350 INJECTION, SOLUTION INTRAVENOUS at 16:38

## 2020-01-14 ENCOUNTER — ANESTHESIA (OUTPATIENT)
Dept: SURGERY | Facility: MEDICAL CENTER | Age: 50
End: 2020-01-14
Payer: COMMERCIAL

## 2020-01-14 ENCOUNTER — HOSPITAL ENCOUNTER (OUTPATIENT)
Facility: MEDICAL CENTER | Age: 50
End: 2020-01-14
Attending: SURGERY | Admitting: SURGERY
Payer: COMMERCIAL

## 2020-01-14 ENCOUNTER — ANESTHESIA EVENT (OUTPATIENT)
Dept: SURGERY | Facility: MEDICAL CENTER | Age: 50
End: 2020-01-14
Payer: COMMERCIAL

## 2020-01-14 ENCOUNTER — APPOINTMENT (OUTPATIENT)
Dept: RADIOLOGY | Facility: MEDICAL CENTER | Age: 50
End: 2020-01-14
Attending: SURGERY
Payer: COMMERCIAL

## 2020-01-14 VITALS
TEMPERATURE: 97.2 F | DIASTOLIC BLOOD PRESSURE: 85 MMHG | RESPIRATION RATE: 20 BRPM | OXYGEN SATURATION: 99 % | HEART RATE: 78 BPM | SYSTOLIC BLOOD PRESSURE: 112 MMHG | BODY MASS INDEX: 40.4 KG/M2 | HEIGHT: 65 IN | WEIGHT: 242.51 LBS

## 2020-01-14 PROCEDURE — 160048 HCHG OR STATISTICAL LEVEL 1-5: Performed by: SURGERY

## 2020-01-14 PROCEDURE — 160028 HCHG SURGERY MINUTES - 1ST 30 MINS LEVEL 3: Performed by: SURGERY

## 2020-01-14 PROCEDURE — C1788 PORT, INDWELLING, IMP: HCPCS | Performed by: SURGERY

## 2020-01-14 PROCEDURE — 700101 HCHG RX REV CODE 250: Performed by: SURGERY

## 2020-01-14 PROCEDURE — 700105 HCHG RX REV CODE 258: Performed by: SURGERY

## 2020-01-14 PROCEDURE — 160035 HCHG PACU - 1ST 60 MINS PHASE I: Performed by: SURGERY

## 2020-01-14 PROCEDURE — 160036 HCHG PACU - EA ADDL 30 MINS PHASE I: Performed by: SURGERY

## 2020-01-14 PROCEDURE — 501838 HCHG SUTURE GENERAL: Performed by: SURGERY

## 2020-01-14 PROCEDURE — 160002 HCHG RECOVERY MINUTES (STAT): Performed by: SURGERY

## 2020-01-14 PROCEDURE — 160046 HCHG PACU - 1ST 60 MINS PHASE II: Performed by: SURGERY

## 2020-01-14 PROCEDURE — 700111 HCHG RX REV CODE 636 W/ 250 OVERRIDE (IP): Performed by: SURGERY

## 2020-01-14 PROCEDURE — 160009 HCHG ANES TIME/MIN: Performed by: SURGERY

## 2020-01-14 PROCEDURE — A6402 STERILE GAUZE <= 16 SQ IN: HCPCS | Performed by: SURGERY

## 2020-01-14 PROCEDURE — 700111 HCHG RX REV CODE 636 W/ 250 OVERRIDE (IP): Performed by: ANESTHESIOLOGY

## 2020-01-14 PROCEDURE — 160039 HCHG SURGERY MINUTES - EA ADDL 1 MIN LEVEL 3: Performed by: SURGERY

## 2020-01-14 PROCEDURE — 71045 X-RAY EXAM CHEST 1 VIEW: CPT

## 2020-01-14 PROCEDURE — 160025 RECOVERY II MINUTES (STATS): Performed by: SURGERY

## 2020-01-14 DEVICE — CATHETER POWERPORT CLEARVUE MRI 8FR ATTACHABLE CHRONOFLEX: Type: IMPLANTABLE DEVICE | Status: FUNCTIONAL

## 2020-01-14 RX ORDER — SODIUM CHLORIDE, SODIUM LACTATE, POTASSIUM CHLORIDE, CALCIUM CHLORIDE 600; 310; 30; 20 MG/100ML; MG/100ML; MG/100ML; MG/100ML
INJECTION, SOLUTION INTRAVENOUS CONTINUOUS
Status: DISCONTINUED | OUTPATIENT
Start: 2020-01-14 | End: 2020-01-14 | Stop reason: HOSPADM

## 2020-01-14 RX ORDER — BUPIVACAINE HYDROCHLORIDE AND EPINEPHRINE 5; 5 MG/ML; UG/ML
INJECTION, SOLUTION EPIDURAL; INTRACAUDAL; PERINEURAL
Status: DISCONTINUED | OUTPATIENT
Start: 2020-01-14 | End: 2020-01-14 | Stop reason: HOSPADM

## 2020-01-14 RX ORDER — HALOPERIDOL 5 MG/ML
1 INJECTION INTRAMUSCULAR
Status: DISCONTINUED | OUTPATIENT
Start: 2020-01-14 | End: 2020-01-14 | Stop reason: HOSPADM

## 2020-01-14 RX ORDER — OXYCODONE HYDROCHLORIDE AND ACETAMINOPHEN 5; 325 MG/1; MG/1
2 TABLET ORAL
Status: DISCONTINUED | OUTPATIENT
Start: 2020-01-14 | End: 2020-01-14 | Stop reason: HOSPADM

## 2020-01-14 RX ORDER — HEPARIN SODIUM 5000 [USP'U]/ML
INJECTION, SOLUTION INTRAVENOUS; SUBCUTANEOUS
Status: DISCONTINUED | OUTPATIENT
Start: 2020-01-14 | End: 2020-01-14 | Stop reason: HOSPADM

## 2020-01-14 RX ORDER — HEPARIN SODIUM 5000 [USP'U]/ML
INJECTION, SOLUTION INTRAVENOUS; SUBCUTANEOUS
Status: DISCONTINUED
Start: 2020-01-14 | End: 2020-01-14 | Stop reason: HOSPADM

## 2020-01-14 RX ORDER — DEXAMETHASONE SODIUM PHOSPHATE 4 MG/ML
INJECTION, SOLUTION INTRA-ARTICULAR; INTRALESIONAL; INTRAMUSCULAR; INTRAVENOUS; SOFT TISSUE PRN
Status: DISCONTINUED | OUTPATIENT
Start: 2020-01-14 | End: 2020-01-14 | Stop reason: SURG

## 2020-01-14 RX ORDER — DIPHENHYDRAMINE HYDROCHLORIDE 50 MG/ML
12.5 INJECTION INTRAMUSCULAR; INTRAVENOUS
Status: DISCONTINUED | OUTPATIENT
Start: 2020-01-14 | End: 2020-01-14 | Stop reason: HOSPADM

## 2020-01-14 RX ORDER — ONDANSETRON 2 MG/ML
4 INJECTION INTRAMUSCULAR; INTRAVENOUS
Status: DISCONTINUED | OUTPATIENT
Start: 2020-01-14 | End: 2020-01-14 | Stop reason: HOSPADM

## 2020-01-14 RX ORDER — CEFAZOLIN SODIUM 1 G/3ML
INJECTION, POWDER, FOR SOLUTION INTRAMUSCULAR; INTRAVENOUS PRN
Status: DISCONTINUED | OUTPATIENT
Start: 2020-01-14 | End: 2020-01-14 | Stop reason: SURG

## 2020-01-14 RX ORDER — BUPIVACAINE HYDROCHLORIDE AND EPINEPHRINE 5; 5 MG/ML; UG/ML
INJECTION, SOLUTION EPIDURAL; INTRACAUDAL; PERINEURAL
Status: DISCONTINUED
Start: 2020-01-14 | End: 2020-01-14 | Stop reason: HOSPADM

## 2020-01-14 RX ORDER — OXYCODONE HYDROCHLORIDE AND ACETAMINOPHEN 5; 325 MG/1; MG/1
1 TABLET ORAL
Status: DISCONTINUED | OUTPATIENT
Start: 2020-01-14 | End: 2020-01-14 | Stop reason: HOSPADM

## 2020-01-14 RX ORDER — ONDANSETRON 2 MG/ML
INJECTION INTRAMUSCULAR; INTRAVENOUS PRN
Status: DISCONTINUED | OUTPATIENT
Start: 2020-01-14 | End: 2020-01-14 | Stop reason: SURG

## 2020-01-14 RX ADMIN — ONDANSETRON 4 MG: 2 INJECTION INTRAMUSCULAR; INTRAVENOUS at 10:50

## 2020-01-14 RX ADMIN — FENTANYL CITRATE 100 MCG: 50 INJECTION, SOLUTION INTRAMUSCULAR; INTRAVENOUS at 10:50

## 2020-01-14 RX ADMIN — DEXAMETHASONE SODIUM PHOSPHATE 8 MG: 4 INJECTION, SOLUTION INTRA-ARTICULAR; INTRALESIONAL; INTRAMUSCULAR; INTRAVENOUS; SOFT TISSUE at 10:50

## 2020-01-14 RX ADMIN — SODIUM CHLORIDE, POTASSIUM CHLORIDE, SODIUM LACTATE AND CALCIUM CHLORIDE: 600; 310; 30; 20 INJECTION, SOLUTION INTRAVENOUS at 10:18

## 2020-01-14 RX ADMIN — MIDAZOLAM HYDROCHLORIDE 2 MG: 1 INJECTION, SOLUTION INTRAMUSCULAR; INTRAVENOUS at 10:44

## 2020-01-14 RX ADMIN — CEFAZOLIN 2 G: 330 INJECTION, POWDER, FOR SOLUTION INTRAMUSCULAR; INTRAVENOUS at 10:31

## 2020-01-14 RX ADMIN — PROPOFOL 200 MG: 10 INJECTION, EMULSION INTRAVENOUS at 10:44

## 2020-01-14 ASSESSMENT — PAIN SCALES - GENERAL: PAIN_LEVEL: 2

## 2020-01-14 NOTE — ANESTHESIA PREPROCEDURE EVALUATION
Relevant Problems   No relevant active problems       Physical Exam    Airway   Mallampati: II  TM distance: >3 FB       Cardiovascular   Rhythm: regular  Rate: normal     Dental    Pulmonary - normal exam     Abdominal   Abdomen: soft     Neurological - normal exam                 Anesthesia Plan    ASA 2       Plan - general       Airway plan will be LMA      Plan Factors:   Patient did not smoke on day of procedure.      Induction: intravenous          Informed Consent:

## 2020-01-14 NOTE — ANESTHESIA PROCEDURE NOTES
Airway  Date/Time: 1/14/2020 10:48 AM  Performed by: Charly Coker M.D.  Authorized by: Charly Coker M.D.     Location:  OR  Urgency:  Elective  Difficult Airway: No    Indications for Airway Management:  Anesthesia  Spontaneous Ventilation: present    Sedation Level:  Deep  Preoxygenated: Yes    Patient Position:  Sniffing  MILS Maintained Throughout: Yes    Mask Difficulty Assessment:  0 - not attempted  Final Airway Type:  Supraglottic airway  Final Supraglottic Airway:  Standard LMA  SGA Size:  4  Number of Attempts at Approach:  1

## 2020-01-14 NOTE — OP REPORT
Pre op diagnosis:  Malignant neoplasm overlapping sites left breast  Post op diagnosis:  Same    Procedure:  Placement of right internal jugular (centrally inserted) Power port catheter with intraoperative US and fluoroscopy    Surgeon:  Yaima Najera MD    Anesthesiologist:  Charly Coker MD    Anesthesia:  General  Pre op meds:  Ancef  ASA 2    Indications:  This is a 49 year old female with left breast cancer.  This is HER2 positive and she will be undergoing neoadjuvant treatment.  After discussing risks and benefits, she is ready to proceed with port placement.    Findings:    1.  All images interpreted in the OR by me  2.  Easy cannulation of the right internal jugular vein  3.  Line tip in cavoatrial junction    Summary:  The patient was anesthetized, then her arms were padded and tucked.  She was prepped and draped in sterile fashion.  Time out was confirmed.  Local anesthetic was injected prior to all incisions.      US was used to locate the internal jugular vein which was cannulated with the introducer needle.  Venous flow was observed, then the guidewire was passed into the SVC under fluoroscopy.  I created the port pocket, then tunneled the catheter subcutaneously to the percutaneous access site.  Under fluoroscopy, the dilator sheath was inserted smoothly over the guidewire, then the wire and dilator were removed.  The catheter was inserted and positioned under fluoroscopy, and the sheath was removed.  The catheter was connected to the port and venous blood was easily aspirated, then heparinized saline was inserted.  There was excellent hemostasis.  I closed the deep dermal layer then 4-0 monocryl was used for skin.  5-0 fast absorbing gut was used to close the access site, then a sterile amaris was placed over the port.  Dressings were placed and I was informed all counts were correct.  Postprocedure CXR is pending.    CC: TASIA Aranda MD

## 2020-01-14 NOTE — ANESTHESIA TIME REPORT
Anesthesia Start and Stop Event Times     Date Time Event    1/14/2020 1030 Ready for Procedure     1031 Anesthesia Start     1118 Anesthesia Stop        Responsible Staff  01/14/20    Name Role Begin End    Charly Coker M.D. Anesth 1031 1118        Preop Diagnosis (Free Text):  Pre-op Diagnosis     LEFT BREAST CANCER        Preop Diagnosis (Codes):    Post op Diagnosis  Breast CA (HCC)      Premium Reason  Non-Premium    Comments:

## 2020-01-14 NOTE — ANESTHESIA POSTPROCEDURE EVALUATION
Patient: Lori Mendes    Procedure Summary     Date:  01/14/20 Room / Location:  [facility] ROOM 28 / SURGERY SAME DAY [facility] ORS    Anesthesia Start:  1031 Anesthesia Stop:  1118    Procedure:  INSERTION, CATHETER - PORT (Right Chest) Diagnosis:  (LEFT BREAST CANCER)    Surgeon:  Yaima Najera M.D. Responsible Provider:  Charly Coker M.D.    Anesthesia Type:  general ASA Status:  2          Final Anesthesia Type: general  Last vitals  BP   Blood Pressure: 136/63    Temp   37.1 °C (98.7 °F)    Pulse   Pulse: 70   Resp   16    SpO2   93 %      Anesthesia Post Evaluation    Patient location during evaluation: PACU  Patient participation: complete - patient participated  Level of consciousness: awake  Pain score: 2    Airway patency: patent  Anesthetic complications: no  Cardiovascular status: adequate  Respiratory status: acceptable  Hydration status: acceptable    PONV: none

## 2020-01-14 NOTE — DISCHARGE INSTRUCTIONS
ACTIVITY: Rest and take it easy for the first 24 hours.  A responsible adult is recommended to remain with you during that time.  It is normal to feel sleepy.  We encourage you to not do anything that requires balance, judgment or coordination.    MILD FLU-LIKE SYMPTOMS ARE NORMAL. YOU MAY EXPERIENCE GENERALIZED MUSCLE ACHES, THROAT IRRITATION, HEADACHE AND/OR SOME NAUSEA.    FOR 24 HOURS DO NOT:  Drive, operate machinery or run household appliances.  Drink beer or alcoholic beverages.   Make important decisions or sign legal documents.    DIET: To avoid nausea, slowly advance diet as tolerated, avoiding spicy or greasy foods for the first day.  Add more substantial food to your diet according to your physician's instructions.  Babies can be fed formula or breast milk as soon as they are hungry.  INCREASE FLUIDS AND FIBER TO AVOID CONSTIPATION.    SURGICAL DRESSING/BATHING: Leave dressings in place unltil  they fall off, 3-4 weeks if possible. Ok to remove neck dressing if needed. Do not remove the steri-strips. May shower but noo tub baths, hot tubs or swimming until cleared by your physician.    FOLLOW-UP APPOINTMENT:  A follow-up appointment should be arranged with your doctor in; call to schedule.    You should CALL YOUR PHYSICIAN if you develop:  Fever greater than 101 degrees F.  Pain not relieved by medication, or persistent nausea or vomiting.  Excessive bleeding (blood soaking through dressing) or unexpected drainage from the wound.  Extreme redness or swelling around the incision site, drainage of pus or foul smelling drainage.  Inability to urinate or empty your bladder within 8 hours.  Problems with breathing or chest pain.    You should call 911 if you develop problems with breathing or chest pain.  If you are unable to contact your doctor or surgical center, you should go to the nearest emergency room or urgent care center.  Physician's telephone #: [phone]    If any questions arise, call your  doctor.  If your doctor is not available, please feel free to call the Surgical Center at [phone].  The Center is open Monday through Friday from 7AM to 7PM.  You can also call the HEALTH HOTLINE open 24 hours/day, 7 days/week and speak to a nurse at [phone], or toll free at [phone].    A registered nurse may call you a few days after your surgery to see how you are doing after your procedure.    MEDICATIONS: Resume taking daily medication.  Take prescribed pain medication with food.  If no medication is prescribed, you may take non-aspirin pain medication if needed.  PAIN MEDICATION CAN BE VERY CONSTIPATING.  Take a stool softener or laxative such as senokot, pericolace, or milk of magnesia if needed.    Prescription given for Norco and Zofran.  You may take prescription pain medication at any time.     If your physician has prescribed pain medication that includes Acetaminophen (Tylenol), do not take additional Acetaminophen (Tylenol) while taking the prescribed medication.    Depression / Suicide Risk    As you are discharged from this [facility] facility, it is important to learn how to keep safe from harming yourself.    Recognize the warning signs:  · Abrupt changes in personality, positive or negative- including increase in energy   · Giving away possessions  · Change in eating patterns- significant weight changes-  positive or negative  · Change in sleeping patterns- unable to sleep or sleeping all the time   · Unwillingness or inability to communicate  · Depression  · Unusual sadness, discouragement and loneliness  · Talk of wanting to die  · Neglect of personal appearance   · Rebelliousness- reckless behavior  · Withdrawal from people/activities they love  · Confusion- inability to concentrate     If you or a loved one observes any of these behaviors or has concerns about self-harm, here's what you can do:  · Talk about it- your feelings and reasons for harming yourself  · Remove any  means that you might use to hurt yourself (examples: pills, rope, extension cords, firearm)  · Get professional help from the community (Mental Health, Substance Abuse, psychological counseling)  · Do not be alone:Call your Safe Contact- someone whom you trust who will be there for you.  · Call your local CRISIS HOTLINE 545-5506 or 911-775-7093  · Call your local Children's Mobile Crisis Response Team [place] (962) 336-6121 or www.[employer]  · Call the toll free National Suicide Prevention Hotlines   · National Suicide Prevention Lifeline 596-979-DIUH (1003)  · National Hope Line Network 800-SUICIDE (721-7513)

## 2020-01-14 NOTE — ANESTHESIA QCDR
2019 [facility] Clinical Data Registry (for Quality Improvement)     Postoperative nausea/vomiting risk protocol (Adult = 18 yrs and Pediatric 3-17 yrs)- (430 and 463)  General inhalation anesthetic (NOT TIVA) with PONV risk factors: Yes  Provision of anti-emetic therapy with at least 2 different classes of agents: Yes   Patient DID NOT receive anti-emetic therapy and reason is documented in Medical Record:  N/A    Multimodal Pain Management- (477)  Non-emergent surgery AND patient age >= 18: Yes  Use of Multimodal Pain Management, two or more drugs and/or interventions, NOT including systemic opioids: No  Exception: Documented allergy to multiple classes of analgesics:        Smoking Abstinence (404)  Patient is current smoker (cigarette, pipe, e-cig, marijuanna): No  Elective Surgery:   Abstinence instructions provided prior to day of surgery:   Patient abstained from smoking on day of surgery:     Pre-Op Beta-Blocker in Isolated CABG (44)  Isolated CABG AND patient age >= 18: No  Beta-blocker admin within 24 hours of surgical incision:   Exception:of medical reason(s) for not administering beta blocker within 24 hours prior to surgical incision (e.g., not  indicated,other medical reason):     PACU assessment of acute postoperative pain prior to Anesthesia Care End- Applies to Patients Age = 18- (ABG7)  Initial PACU pain score is which of the following: Pain not assessed  Patient unable to report pain score:     Post-anesthetic transfer of care checklist/protocol to PACU/ICU- (426 and 427)  Upon conclusion of case, patient transferred to which of the following locations: PACU/Non-ICU  Use of transfer checklist/protocol: Yes  Exclusion: Service Performed in Patient Hospital Room (and thus did not require transfer): N/A  Unplanned admission to ICU related to anesthesia service up through end of PACU care- (MD51)  Unplanned admission to ICU (not initially anticipated at anesthesia start time): No

## 2020-01-14 NOTE — OR NURSING
1116   Pt received to pacu, awake but sleepy, with spontaneous respirations noted. Report received from Dr. Coker. Vital signs taken and stable. No distress noted. Right upper chest    Pt received to pacu, awake with spontaneous respirations noted. Report received from anesthesia. Vital signs taken and stable.  No distress noted. Right upper chest with gauze dressing covered by tegaderm, no drainage noted.     1130  Portable chest xray done as ordered.    1200  Pt continues to sleep, vitals stable.     1230  Pt awake, feels ready to get dressed. Sister here, pt up to dress.  1255  Discharge instructions given, all questions answered. No drainage noted on dressing. No shortness of breath noted. Pt discharged to home with sister, ambulated to car.

## 2020-01-14 NOTE — OR NURSING
1200- report received from Chantell JEROME for break coverage. Pt's daughter given Rx, will fill at home pharmacy. Instructions from MD reviewed with patient and daughter. 2 dressings, dry gauze and tegaderm CDI.    1215- left message with MD for discharge order    1230- report back to Chantell JEROME.

## 2020-01-16 ENCOUNTER — HOSPITAL ENCOUNTER (OUTPATIENT)
Dept: LAB | Facility: MEDICAL CENTER | Age: 50
End: 2020-01-16
Attending: INTERNAL MEDICINE
Payer: COMMERCIAL

## 2020-01-16 ENCOUNTER — OFFICE VISIT (OUTPATIENT)
Dept: MEDICAL GROUP | Facility: CLINIC | Age: 50
End: 2020-01-16
Payer: COMMERCIAL

## 2020-01-16 VITALS
RESPIRATION RATE: 14 BRPM | WEIGHT: 245 LBS | HEART RATE: 73 BPM | HEIGHT: 66 IN | TEMPERATURE: 98.4 F | OXYGEN SATURATION: 95 % | BODY MASS INDEX: 39.37 KG/M2 | DIASTOLIC BLOOD PRESSURE: 74 MMHG | SYSTOLIC BLOOD PRESSURE: 120 MMHG

## 2020-01-16 DIAGNOSIS — F33.0 MILD EPISODE OF RECURRENT MAJOR DEPRESSIVE DISORDER (HCC): ICD-10-CM

## 2020-01-16 DIAGNOSIS — C50.419: ICD-10-CM

## 2020-01-16 DIAGNOSIS — Z23 NEED FOR VACCINATION: ICD-10-CM

## 2020-01-16 LAB
25(OH)D3 SERPL-MCNC: 19 NG/ML (ref 30–100)
ALBUMIN SERPL BCP-MCNC: 4.2 G/DL (ref 3.2–4.9)
ALBUMIN/GLOB SERPL: 1.4 G/DL
ALP SERPL-CCNC: 65 U/L (ref 30–99)
ALT SERPL-CCNC: 20 U/L (ref 2–50)
ANION GAP SERPL CALC-SCNC: 11 MMOL/L (ref 0–11.9)
AST SERPL-CCNC: 18 U/L (ref 12–45)
BASOPHILS # BLD AUTO: 1.2 % (ref 0–1.8)
BASOPHILS # BLD: 0.1 K/UL (ref 0–0.12)
BILIRUB SERPL-MCNC: 0.8 MG/DL (ref 0.1–1.5)
BUN SERPL-MCNC: 20 MG/DL (ref 8–22)
CALCIUM SERPL-MCNC: 9.4 MG/DL (ref 8.5–10.5)
CHLORIDE SERPL-SCNC: 104 MMOL/L (ref 96–112)
CO2 SERPL-SCNC: 27 MMOL/L (ref 20–33)
COMMENT 1642: NORMAL
CREAT SERPL-MCNC: 0.9 MG/DL (ref 0.5–1.4)
EOSINOPHIL # BLD AUTO: 0.1 K/UL (ref 0–0.51)
EOSINOPHIL NFR BLD: 1.2 % (ref 0–6.9)
ERYTHROCYTE [DISTWIDTH] IN BLOOD BY AUTOMATED COUNT: 49.5 FL (ref 35.9–50)
ESTRADIOL SERPL-MCNC: 46 PG/ML
FSH SERPL-ACNC: 11.6 MIU/ML
GLOBULIN SER CALC-MCNC: 3.1 G/DL (ref 1.9–3.5)
GLUCOSE SERPL-MCNC: 83 MG/DL (ref 65–99)
HCT VFR BLD AUTO: 42.8 % (ref 37–47)
HGB BLD-MCNC: 14 G/DL (ref 12–16)
IMM GRANULOCYTES # BLD AUTO: 0.04 K/UL (ref 0–0.11)
IMM GRANULOCYTES NFR BLD AUTO: 0.5 % (ref 0–0.9)
LYMPHOCYTES # BLD AUTO: 3.89 K/UL (ref 1–4.8)
LYMPHOCYTES NFR BLD: 46.9 % (ref 22–41)
MCH RBC QN AUTO: 29 PG (ref 27–33)
MCHC RBC AUTO-ENTMCNC: 32.7 G/DL (ref 33.6–35)
MCV RBC AUTO: 88.6 FL (ref 81.4–97.8)
MONOCYTES # BLD AUTO: 0.58 K/UL (ref 0–0.85)
MONOCYTES NFR BLD AUTO: 7 % (ref 0–13.4)
MORPHOLOGY BLD-IMP: NORMAL
NEUTROPHILS # BLD AUTO: 3.59 K/UL (ref 2–7.15)
NEUTROPHILS NFR BLD: 43.2 % (ref 44–72)
NRBC # BLD AUTO: 0 K/UL
NRBC BLD-RTO: 0 /100 WBC
PLATELET # BLD AUTO: 330 K/UL (ref 164–446)
PLATELET BLD QL SMEAR: NORMAL
PMV BLD AUTO: 11.6 FL (ref 9–12.9)
POTASSIUM SERPL-SCNC: 3.6 MMOL/L (ref 3.6–5.5)
PROT SERPL-MCNC: 7.3 G/DL (ref 6–8.2)
RBC # BLD AUTO: 4.83 M/UL (ref 4.2–5.4)
RBC BLD AUTO: NORMAL
SODIUM SERPL-SCNC: 142 MMOL/L (ref 135–145)
WBC # BLD AUTO: 8.3 K/UL (ref 4.8–10.8)

## 2020-01-16 PROCEDURE — 82670 ASSAY OF TOTAL ESTRADIOL: CPT

## 2020-01-16 PROCEDURE — 36415 COLL VENOUS BLD VENIPUNCTURE: CPT

## 2020-01-16 PROCEDURE — 82306 VITAMIN D 25 HYDROXY: CPT

## 2020-01-16 PROCEDURE — 99213 OFFICE O/P EST LOW 20 MIN: CPT | Performed by: PHYSICIAN ASSISTANT

## 2020-01-16 PROCEDURE — 80053 COMPREHEN METABOLIC PANEL: CPT

## 2020-01-16 PROCEDURE — 85025 COMPLETE CBC W/AUTO DIFF WBC: CPT

## 2020-01-16 PROCEDURE — 86300 IMMUNOASSAY TUMOR CA 15-3: CPT

## 2020-01-16 PROCEDURE — 83001 ASSAY OF GONADOTROPIN (FSH): CPT

## 2020-01-16 RX ORDER — LIDOCAINE AND PRILOCAINE 25; 25 MG/G; MG/G
1 CREAM TOPICAL PRN
Status: ON HOLD | COMMUNITY
Start: 2019-12-26 | End: 2020-06-11

## 2020-01-16 RX ORDER — DESOGESTREL AND ETHINYL ESTRADIOL 21-5 (28)
1 KIT ORAL DAILY
COMMUNITY
End: 2020-01-16

## 2020-01-16 RX ORDER — CITALOPRAM 20 MG/1
30 TABLET ORAL DAILY
Qty: 135 TAB | Refills: 3 | Status: SHIPPED
Start: 2020-01-16 | End: 2020-07-11

## 2020-01-16 RX ORDER — HYDROCODONE BITARTRATE AND ACETAMINOPHEN 5; 325 MG/1; MG/1
1 TABLET ORAL EVERY 4 HOURS PRN
COMMUNITY
End: 2020-06-09

## 2020-01-16 NOTE — ASSESSMENT & PLAN NOTE
Continued treatment is progressing quickly. Patient is staying hopeful and feeling good about this. She has lung nodules suspicious for mets.

## 2020-01-16 NOTE — PROGRESS NOTES
Chief Complaint   Patient presents with   • Follow-Up       HISTORY OF PRESENT ILLNESS: Patient is a 49 y.o. female established patient who presents today for evaluation and management of:    Malig neoplasm of upper-outer quadrant of unsp female breast (HCC)  Continued treatment is progressing quickly. Patient is staying hopeful and feeling good about this. She has lung nodules suspicious for mets.     Mild episode of recurrent major depressive disorder (HCC)  This is a chronic condition that remains well controlled on Celexa 30 mg tablets daily and very rare hydroxyzine 25 mg tablets as needed.  This patient has a recent breast cancer diagnosis and feels hopeful.   She denies adverse side effects and is requesting refills at this time.        Patient Active Problem List    Diagnosis Date Noted   • Malig neoplasm of upper-outer quadrant of unsp female breast (HCC) 12/19/2019   • Encounter for birth control pills maintenance 12/05/2019   • Diarrhea in adult patient 12/05/2019   • Insomnia 08/09/2017   • Dumping syndrome 08/09/2017   • Mild episode of recurrent major depressive disorder (HCC) 09/01/2015   • S/P cholecystectomy 10/09/2014   • Class 3 severe obesity due to excess calories without serious comorbidity with body mass index (BMI) of 40.0 to 44.9 in adult (HCC) 01/04/2013   • Vitamin D deficiency 02/07/2012   • Lumbar radiculitis 02/07/2012   • Anxiety disorder 01/09/2012       Allergies:Morphine    Current Outpatient Medications   Medication Sig Dispense Refill   • lidocaine-prilocaine (EMLA) 2.5-2.5 % Cream      • citalopram (CELEXA) 20 MG Tab Take 1.5 Tabs by mouth every day. 135 Tab 3   • HYDROcodone-acetaminophen (NORCO) 5-325 MG Tab per tablet Take 1 Tab by mouth every four hours as needed.     • DOCEtaxel (TAXOTERE IV) by Intravenous route.     • Trastuzumab (HERCEPTIN IV) by Intravenous route.     • gabapentin (NEURONTIN) 300 MG Cap Take 2 Caps by mouth 2 Times a Day. 360 Cap 3   • hydrOXYzine HCl  (ATARAX) 25 MG Tab Take 1 Tab by mouth 3 times a day as needed. 90 Tab 3     No current facility-administered medications for this visit.        Social History     Tobacco Use   • Smoking status: Never Smoker   • Smokeless tobacco: Former User     Types: Chew   • Tobacco comment: unable to quantify chewing   Substance Use Topics   • Alcohol use: No     Alcohol/week: 0.0 oz   • Drug use: No       Family Status   Relation Name Status   • Mo  Alive        64 in    • Fa  Alive        65 in    • MGMo     • PGMo       Family History   Problem Relation Age of Onset   • Hypertension Father    • Cancer Maternal Grandmother 50        breast cancer, cervical cancer and lung cancer   • Breast Cancer Maternal Grandmother    • Cancer Paternal Grandmother 60        breast cancer   • Breast Cancer Paternal Grandmother    • Diabetes Paternal Grandmother    • Osteoporosis Paternal Grandmother        Review of Systems: See HPI above.   Constitutional: Negative for fever, chills, weight loss and malaise.   HENT: Negative for ear pain, nosebleeds, congestion, sore throat and neck pain.    Eyes: Negative for blurred vision.   Respiratory: Negative for shortness of breath, cough, sputum production and wheezing.    Cardiovascular: Negative for chest pain, palpitations, orthopnea and leg swelling.   Gastrointestinal: Negative for heartburn, nausea, vomiting and abdominal pain.   Genitourinary: Negative for dysuria, urgency and frequency.   Musculoskeletal: Negative for myalgias, back pain and joint pain.   Skin: Negative for rash and itching.   Neurological: Negative for dizziness, tingling, tremors, sensory change, focal weakness and headaches.   Endo/Heme/Allergies: Does not bruise/bleed easily.   Psychiatric/Behavioral: positive for well-controlled depression, suicidal ideas and memory loss.  The patient is not nervous/anxious and does not have insomnia.      Exam:  /74 (BP Location: Left arm, Patient  "Position: Sitting, BP Cuff Size: Large adult)   Pulse 73   Temp 36.9 °C (98.4 °F) (Temporal)   Resp 14   Ht 1.67 m (5' 5.75\")   Wt 111.1 kg (245 lb)   SpO2 95%   Body mass index is 39.85 kg/m².  General:  Obese female in NAD  Head: is grossly normal.  Neck: Supple without masses. Thyroid is not visibly enlarged.  Pulmonary: Clear to ausculation. Normal effort. No rales, ronchi, or wheezing.  Soft rhonchi auscultated in left lower lung field.  Fine crackles auscultated in bilateral lower lung fields.  Cardiovascular: Regular rate and rhythm without murmur. Carotid pulses are intact and equal bilaterally.  Extremities: no clubbing, cyanosis, or edema.    Medical decision-making and discussion:  1. Mild episode of recurrent major depressive disorder (HCC)    - citalopram (CELEXA) 20 MG Tab; Take 1.5 Tabs by mouth every day.  Dispense: 135 Tab; Refill: 3    2. Need for vaccination    - Influenza Vaccine Quad Injection (PF)    3. Malig neoplasm of upper-outer quadrant of unsp female breast (HCC)        Please note that this dictation was created using voice recognition software. I have made every reasonable attempt to correct obvious errors, but I expect that there are errors of grammar and possibly content that I did not discover before finalizing the note.      Return in about 3 months (around 4/16/2020) for Chronic conditions and as needed for oncology support.  "

## 2020-01-16 NOTE — ASSESSMENT & PLAN NOTE
This is a chronic condition that remains well controlled on Celexa 30 mg tablets daily and very rare hydroxyzine 25 mg tablets as needed.  This patient has a recent breast cancer diagnosis and feels hopeful.   She denies adverse side effects and is requesting refills at this time.

## 2020-01-18 LAB — CANCER AG27-29 SERPL-ACNC: 5 U/ML (ref 0–40)

## 2020-01-20 ENCOUNTER — HOSPITAL ENCOUNTER (OUTPATIENT)
Dept: RADIOLOGY | Facility: MEDICAL CENTER | Age: 50
End: 2020-01-20
Attending: INTERNAL MEDICINE
Payer: COMMERCIAL

## 2020-01-20 DIAGNOSIS — C50.812 MALIGNANT NEOPLASM OF OVERLAPPING SITES OF LEFT FEMALE BREAST, UNSPECIFIED ESTROGEN RECEPTOR STATUS (HCC): ICD-10-CM

## 2020-01-20 PROCEDURE — A9552 F18 FDG: HCPCS

## 2020-01-23 ENCOUNTER — HOSPITAL ENCOUNTER (OUTPATIENT)
Dept: CARDIOLOGY | Facility: MEDICAL CENTER | Age: 50
End: 2020-01-23
Attending: INTERNAL MEDICINE
Payer: COMMERCIAL

## 2020-01-23 DIAGNOSIS — C50.812 MALIGNANT NEOPLASM OF OVERLAPPING SITES OF LEFT FEMALE BREAST, UNSPECIFIED ESTROGEN RECEPTOR STATUS (HCC): ICD-10-CM

## 2020-01-23 LAB
LV EJECT FRACT  99904: 65
LV EJECT FRACT MOD 2C 99903: 66.89
LV EJECT FRACT MOD 4C 99902: 69.66
LV EJECT FRACT MOD BP 99901: 67.74

## 2020-01-23 PROCEDURE — 93306 TTE W/DOPPLER COMPLETE: CPT

## 2020-01-23 PROCEDURE — 93306 TTE W/DOPPLER COMPLETE: CPT | Mod: 26 | Performed by: INTERNAL MEDICINE

## 2020-02-10 ENCOUNTER — HOSPITAL ENCOUNTER (EMERGENCY)
Facility: MEDICAL CENTER | Age: 50
End: 2020-02-10
Attending: EMERGENCY MEDICINE
Payer: COMMERCIAL

## 2020-02-10 VITALS
SYSTOLIC BLOOD PRESSURE: 147 MMHG | OXYGEN SATURATION: 96 % | TEMPERATURE: 96.8 F | HEART RATE: 79 BPM | BODY MASS INDEX: 40.15 KG/M2 | HEIGHT: 65 IN | DIASTOLIC BLOOD PRESSURE: 115 MMHG | WEIGHT: 241 LBS | RESPIRATION RATE: 18 BRPM

## 2020-02-10 DIAGNOSIS — R11.2 NON-INTRACTABLE VOMITING WITH NAUSEA, UNSPECIFIED VOMITING TYPE: ICD-10-CM

## 2020-02-10 DIAGNOSIS — E86.0 DEHYDRATION: ICD-10-CM

## 2020-02-10 LAB
ALBUMIN SERPL BCP-MCNC: 4.8 G/DL (ref 3.2–4.9)
ALBUMIN/GLOB SERPL: 1.8 G/DL
ALP SERPL-CCNC: 82 U/L (ref 30–99)
ALT SERPL-CCNC: 66 U/L (ref 2–50)
ANION GAP SERPL CALC-SCNC: 15 MMOL/L (ref 0–11.9)
ANISOCYTOSIS BLD QL SMEAR: ABNORMAL
AST SERPL-CCNC: 37 U/L (ref 12–45)
BASOPHILS # BLD AUTO: 0 % (ref 0–1.8)
BASOPHILS # BLD: 0 K/UL (ref 0–0.12)
BILIRUB SERPL-MCNC: 1.3 MG/DL (ref 0.1–1.5)
BUN SERPL-MCNC: 18 MG/DL (ref 8–22)
CALCIUM SERPL-MCNC: 10 MG/DL (ref 8.5–10.5)
CHLORIDE SERPL-SCNC: 95 MMOL/L (ref 96–112)
CO2 SERPL-SCNC: 23 MMOL/L (ref 20–33)
CREAT SERPL-MCNC: 1 MG/DL (ref 0.5–1.4)
EOSINOPHIL # BLD AUTO: 0.03 K/UL (ref 0–0.51)
EOSINOPHIL NFR BLD: 0.9 % (ref 0–6.9)
ERYTHROCYTE [DISTWIDTH] IN BLOOD BY AUTOMATED COUNT: 44.1 FL (ref 35.9–50)
GLOBULIN SER CALC-MCNC: 2.7 G/DL (ref 1.9–3.5)
GLUCOSE SERPL-MCNC: 105 MG/DL (ref 65–99)
HCT VFR BLD AUTO: 45.8 % (ref 37–47)
HGB BLD-MCNC: 15.7 G/DL (ref 12–16)
LIPASE SERPL-CCNC: 43 U/L (ref 11–82)
LYMPHOCYTES # BLD AUTO: 1.77 K/UL (ref 1–4.8)
LYMPHOCYTES NFR BLD: 59.1 % (ref 22–41)
MANUAL DIFF BLD: NORMAL
MCH RBC QN AUTO: 29.3 PG (ref 27–33)
MCHC RBC AUTO-ENTMCNC: 34.3 G/DL (ref 33.6–35)
MCV RBC AUTO: 85.6 FL (ref 81.4–97.8)
MICROCYTES BLD QL SMEAR: ABNORMAL
MONOCYTES # BLD AUTO: 0.47 K/UL (ref 0–0.85)
MONOCYTES NFR BLD AUTO: 15.6 % (ref 0–13.4)
MORPHOLOGY BLD-IMP: NORMAL
NEUTROPHILS # BLD AUTO: 0.73 K/UL (ref 2–7.15)
NEUTROPHILS NFR BLD: 23.5 % (ref 44–72)
NEUTS BAND NFR BLD MANUAL: 0.9 % (ref 0–10)
NRBC # BLD AUTO: 0 K/UL
NRBC BLD-RTO: 0 /100 WBC
OVALOCYTES BLD QL SMEAR: NORMAL
PLATELET # BLD AUTO: 192 K/UL (ref 164–446)
PLATELET BLD QL SMEAR: NORMAL
PMV BLD AUTO: 12.4 FL (ref 9–12.9)
POIKILOCYTOSIS BLD QL SMEAR: NORMAL
POTASSIUM SERPL-SCNC: 3.1 MMOL/L (ref 3.6–5.5)
PROT SERPL-MCNC: 7.5 G/DL (ref 6–8.2)
RBC # BLD AUTO: 5.35 M/UL (ref 4.2–5.4)
RBC BLD AUTO: PRESENT
SODIUM SERPL-SCNC: 133 MMOL/L (ref 135–145)
WBC # BLD AUTO: 3 K/UL (ref 4.8–10.8)

## 2020-02-10 PROCEDURE — 700102 HCHG RX REV CODE 250 W/ 637 OVERRIDE(OP): Performed by: EMERGENCY MEDICINE

## 2020-02-10 PROCEDURE — A9270 NON-COVERED ITEM OR SERVICE: HCPCS | Performed by: EMERGENCY MEDICINE

## 2020-02-10 PROCEDURE — 85027 COMPLETE CBC AUTOMATED: CPT

## 2020-02-10 PROCEDURE — 99284 EMERGENCY DEPT VISIT MOD MDM: CPT

## 2020-02-10 PROCEDURE — 85007 BL SMEAR W/DIFF WBC COUNT: CPT

## 2020-02-10 PROCEDURE — 83690 ASSAY OF LIPASE: CPT

## 2020-02-10 PROCEDURE — 96374 THER/PROPH/DIAG INJ IV PUSH: CPT

## 2020-02-10 PROCEDURE — 700111 HCHG RX REV CODE 636 W/ 250 OVERRIDE (IP): Performed by: EMERGENCY MEDICINE

## 2020-02-10 PROCEDURE — 80053 COMPREHEN METABOLIC PANEL: CPT

## 2020-02-10 PROCEDURE — 700105 HCHG RX REV CODE 258: Performed by: EMERGENCY MEDICINE

## 2020-02-10 RX ORDER — SODIUM CHLORIDE, SODIUM LACTATE, POTASSIUM CHLORIDE, CALCIUM CHLORIDE 600; 310; 30; 20 MG/100ML; MG/100ML; MG/100ML; MG/100ML
1000 INJECTION, SOLUTION INTRAVENOUS ONCE
Status: COMPLETED | OUTPATIENT
Start: 2020-02-10 | End: 2020-02-10

## 2020-02-10 RX ORDER — ONDANSETRON 4 MG/1
4 TABLET, ORALLY DISINTEGRATING ORAL EVERY 8 HOURS PRN
Qty: 10 TAB | Refills: 0 | Status: SHIPPED | OUTPATIENT
Start: 2020-02-10 | End: 2020-07-11

## 2020-02-10 RX ORDER — POTASSIUM CHLORIDE 20 MEQ/1
40 TABLET, EXTENDED RELEASE ORAL ONCE
Status: COMPLETED | OUTPATIENT
Start: 2020-02-10 | End: 2020-02-10

## 2020-02-10 RX ORDER — ONDANSETRON 2 MG/ML
4 INJECTION INTRAMUSCULAR; INTRAVENOUS ONCE
Status: COMPLETED | OUTPATIENT
Start: 2020-02-10 | End: 2020-02-10

## 2020-02-10 RX ADMIN — ONDANSETRON 4 MG: 2 INJECTION INTRAMUSCULAR; INTRAVENOUS at 12:09

## 2020-02-10 RX ADMIN — SODIUM CHLORIDE, POTASSIUM CHLORIDE, SODIUM LACTATE AND CALCIUM CHLORIDE 1000 ML: 600; 310; 30; 20 INJECTION, SOLUTION INTRAVENOUS at 12:16

## 2020-02-10 RX ADMIN — POTASSIUM CHLORIDE 40 MEQ: 1500 TABLET, EXTENDED RELEASE ORAL at 14:23

## 2020-02-10 RX ADMIN — SODIUM CHLORIDE, POTASSIUM CHLORIDE, SODIUM LACTATE AND CALCIUM CHLORIDE 1000 ML: 600; 310; 30; 20 INJECTION, SOLUTION INTRAVENOUS at 12:17

## 2020-02-10 NOTE — ED TRIAGE NOTES
Also c/o generalized weakness. No unilateral deficits. Neuro intact. Placed in senior lounge room.

## 2020-02-10 NOTE — ED PROVIDER NOTES
ED Provider Note    Scribed for Lulú Rodriguez M.D. by Buffy Reilly. 2/10/2020, 11:33 AM.    Primary care provider: Renetta Lozoya P.A.-C.  Means of arrival: Walk in  History obtained from: Patient  History limited by: None    CHIEF COMPLAINT  Chief Complaint   Patient presents with   • N/V     since tuesday after chemotherapy.   • Diarrhea       HPI  Lori Mendes is a 49 y.o. female who presents to the Emergency Department for ongoing nausea and vomiting that began 6 days ago status post chemotherapy. Patient's sister states that the patient started her first chemotherapy session for breast cancer last week, and has had difficulty keeping foods and fluid down for the past 3 days. Exacerbating factors include eating and drinking water. No alleviating factors were identified. She reports associated diarrhea, diffuse abdominal cramping, hot and cold chills, and diaphoresis. She also reports bruising to her lower extremities. She denies any associated fever.    REVIEW OF SYSTEMS  Pertinent positives include nausea, vomiting, diarrhea, diffuse abdominal cramping, hot and cold chills, diaphoresis, bruising to lower extremities. Pertinent negatives include no fever.  All other systems reviewed and negative.    PAST MEDICAL HISTORY   has a past medical history of Anxiety disorder (1/9/2012), Heart burn, Lumbar radiculitis (2/7/2012), Malig neoplasm of upper-outer quadrant of unsp female breast (HCC) (12/19/2019), Obesity (BMI 30-39.9) (1/4/2013), and S/P laparoscopic cholecystectomy (10/9/14).    SURGICAL HISTORY   has a past surgical history that includes breast biopsy; madhu by laparoscopy (10/9/2014); and cath placement (Right, 1/14/2020).    SOCIAL HISTORY  Social History     Tobacco Use   • Smoking status: Never Smoker   • Smokeless tobacco: Former User     Types: Chew   • Tobacco comment: unable to quantify chewing   Substance Use Topics   • Alcohol use: No     Alcohol/week: 0.0 oz   • Drug use: No     "  Social History     Substance and Sexual Activity   Drug Use No       FAMILY HISTORY  Family History   Problem Relation Age of Onset   • Hypertension Father    • Cancer Maternal Grandmother 50        breast cancer, cervical cancer and lung cancer   • Breast Cancer Maternal Grandmother    • Cancer Paternal Grandmother 60        breast cancer   • Breast Cancer Paternal Grandmother    • Diabetes Paternal Grandmother    • Osteoporosis Paternal Grandmother        CURRENT MEDICATIONS  No current facility-administered medications for this encounter.     Current Outpatient Medications:   •  ondansetron (ZOFRAN ODT) 4 MG TABLET DISPERSIBLE, Take 1 Tab by mouth every 8 hours as needed., Disp: 10 Tab, Rfl: 0  •  lidocaine-prilocaine (EMLA) 2.5-2.5 % Cream, , Disp: , Rfl:   •  citalopram (CELEXA) 20 MG Tab, Take 1.5 Tabs by mouth every day., Disp: 135 Tab, Rfl: 3  •  HYDROcodone-acetaminophen (NORCO) 5-325 MG Tab per tablet, Take 1 Tab by mouth every four hours as needed., Disp: , Rfl:   •  DOCEtaxel (TAXOTERE IV), by Intravenous route., Disp: , Rfl:   •  Trastuzumab (HERCEPTIN IV), by Intravenous route., Disp: , Rfl:   •  gabapentin (NEURONTIN) 300 MG Cap, Take 2 Caps by mouth 2 Times a Day., Disp: 360 Cap, Rfl: 3  •  hydrOXYzine HCl (ATARAX) 25 MG Tab, Take 1 Tab by mouth 3 times a day as needed., Disp: 90 Tab, Rfl: 3    ALLERGIES  Allergies   Allergen Reactions   • Morphine        PHYSICAL EXAM  VITAL SIGNS: /85   Pulse (!) 122   Temp 36 °C (96.8 °F) (Temporal)   Resp 18   Ht 1.651 m (5' 5\")   Wt 109.3 kg (241 lb)   SpO2 97%   BMI 40.10 kg/m²   Constitutional: Alert in no apparent distress.  HENT: No signs of trauma, Bilateral external ears normal, Nose normal. Dry mucous membranes.  Eyes: Pupils are equal and reactive, Conjunctiva normal, Non-icteric.   Neck: Normal range of motion, No tenderness, Supple, No stridor.   Cardiovascular: Tachycardic, Regular rhythm, no murmurs.   Thorax & Lungs: Normal breath " sounds, No respiratory distress, No wheezing, No chest tenderness.   Abdomen: Bowel sounds normal, Soft, No tenderness, No masses, No peritoneal signs.  Skin: Warm, Dry, No erythema, No rash. Old appearing superficial ecchymosis on bilateral lower extremities.  Musculoskeletal:  No major deformities noted.  Neurologic: Alert, moving all extremities without difficulty, no focal deficits.    LABS  Labs Reviewed   CBC WITH DIFFERENTIAL - Abnormal; Notable for the following components:       Result Value    WBC 3.0 (*)     Neutrophils-Polys 23.50 (*)     Lymphocytes 59.10 (*)     Monocytes 15.60 (*)     Neutrophils (Absolute) 0.73 (*)     All other components within normal limits   COMP METABOLIC PANEL - Abnormal; Notable for the following components:    Sodium 133 (*)     Potassium 3.1 (*)     Chloride 95 (*)     Anion Gap 15.0 (*)     Glucose 105 (*)     ALT(SGPT) 66 (*)     All other components within normal limits   ESTIMATED GFR - Abnormal; Notable for the following components:    GFR If Non  58 (*)     All other components within normal limits   LIPASE   PERIPHERAL SMEAR REVIEW   PLATELET ESTIMATE   MORPHOLOGY   DIFFERENTIAL MANUAL     All labs reviewed by me.    COURSE & MEDICAL DECISION MAKING  Pertinent Labs & Imaging studies reviewed. (See chart for details)    Differential diagnoses include but are not limited to: dehydration, electrolyte abnormality, renal failure.    11:33 AM - Patient seen and examined at bedside. Discussed plan of care with patient. I informed them that labs will be ordered to evaluate symptoms. Patient is understanding and agreeable with plan. Patient will be treated with LR infusion, Zofran 4 mg. Ordered Lipase, CMP, and CBC to evaluate her symptoms.     12:00 PM - Ordered differential manual, morphology, platelet estimate, peripheral smear review, and GFR to evaluate.    2:18 PM - Patient will be treated with potassium chloride 40 mEq for her symptoms.    3:03 PM - 3:03  PM - Patient reevaluated at bedside. She is feeling improved after IV fluids. Discussed plan for discharge. Review of vital signs at this visit show:   Vitals:    02/10/20 1400   BP: 147/115   Pulse: 79   Resp:    Temp:    SpO2: 96%     The patient is referred to a primary physician for blood pressure management, diabetic screening, and for all other preventative health concerns. The patient will return for new or worsening symptoms and is stable at the time of discharge.      HYDRATION: Based on the patient's presentation of Dehydration the patient was given IV fluids. IV Hydration was used because oral hydration was not adequate alone. Upon recheck following hydration, the patient was mildly improved.    Decision Making:  This is a 49 y.o. year old female who presents with vomiting and diarrhea.  Patient is receiving chemotherapy.  Patient appears dry on exam she was tachycardic initially.  She was given IV fluids with improvement of her tachycardia.  She felt much better after IV fluids was able to tolerate p.o.  Her labs show an ANC greater than 500.  Her electrolytes are slightly abnormal indicating dehydration and hypokalemia.  Potassium was repleted she has no evidence of pancreatitis with normal lipase.  Given that she was symptomatically better and was able to tolerate p.o. she will be discharged with prescription for Zofran.  She is agreeable to this plan.         DISPOSITION:  Patient will be discharged home in stable condition.    The patient is referred to a primary physician for blood pressure management, diabetic screening, and for all other preventative health concerns.    FOLLOW UP:  Renetta Lozoya P.A.-C.  [address] [address] [phone]    Schedule an appointment as soon as possible for a visit       [facility], Emergency Dept  [address] [facility] [address] [phone]    Worsening vomiting, lightheadedness dehydration or  other concerns      OUTPATIENT MEDICATIONS:  Discharge Medication List as of 2/10/2020  3:13 PM      START taking these medications    Details   ondansetron (ZOFRAN ODT) 4 MG TABLET DISPERSIBLE Take 1 Tab by mouth every 8 hours as needed., Disp-10 Tab, R-0, Print Rx Paper               FINAL IMPRESSION  1. Dehydration    2. Non-intractable vomiting with nausea, unspecified vomiting type               This dictation has been created using voice recognition software and/or scribes. The accuracy of the dictation is limited by the abilities of the software and the expertise of the scribes. I expect there may be some errors of grammar and possibly content. I made every attempt to manually correct the errors within my dictation. However, errors related to voice recognition software and/or scribes may still exist and should be interpreted within the appropriate context.     I, Buffy Reilly (Scribe), am scribing for, and in the presence of, Lulú Rodriguez M.D..    Electronically signed by: Buffy Reilly (Scribe), 2/10/2020    ILulú M.D. personally performed the services described in this documentation, as scribed by Buffy Reilly in my presence, and it is both accurate and complete.    C    The note accurately reflects work and decisions made by me.  Lulú Rodriguez M.D.  2/10/2020  4:36 PM

## 2020-02-10 NOTE — ED TRIAGE NOTES
Chief Complaint   Patient presents with   • N/V     since tuesday after chemotherapy.     Has breast cancer. Unable to tolerate anything. Also c/o severe diarrhea has to go every 45 minutes.

## 2020-02-12 ENCOUNTER — HOSPITAL ENCOUNTER (OUTPATIENT)
Dept: LAB | Facility: MEDICAL CENTER | Age: 50
End: 2020-02-12
Attending: INTERNAL MEDICINE
Payer: COMMERCIAL

## 2020-02-12 LAB
ANION GAP SERPL CALC-SCNC: 12 MMOL/L (ref 0–11.9)
BUN SERPL-MCNC: 12 MG/DL (ref 8–22)
CALCIUM SERPL-MCNC: 9.4 MG/DL (ref 8.5–10.5)
CHLORIDE SERPL-SCNC: 95 MMOL/L (ref 96–112)
CO2 SERPL-SCNC: 28 MMOL/L (ref 20–33)
CREAT SERPL-MCNC: 0.91 MG/DL (ref 0.5–1.4)
GLUCOSE SERPL-MCNC: 102 MG/DL (ref 65–99)
MAGNESIUM SERPL-MCNC: 1.8 MG/DL (ref 1.5–2.5)
POTASSIUM SERPL-SCNC: 3.1 MMOL/L (ref 3.6–5.5)
SODIUM SERPL-SCNC: 135 MMOL/L (ref 135–145)

## 2020-02-12 PROCEDURE — 80048 BASIC METABOLIC PNL TOTAL CA: CPT

## 2020-02-12 PROCEDURE — 83735 ASSAY OF MAGNESIUM: CPT

## 2020-02-24 ENCOUNTER — HOSPITAL ENCOUNTER (OUTPATIENT)
Dept: LAB | Facility: MEDICAL CENTER | Age: 50
End: 2020-02-24
Attending: INTERNAL MEDICINE
Payer: COMMERCIAL

## 2020-02-24 LAB
ALBUMIN SERPL BCP-MCNC: 4.2 G/DL (ref 3.2–4.9)
ALBUMIN/GLOB SERPL: 1.5 G/DL
ALP SERPL-CCNC: 67 U/L (ref 30–99)
ALT SERPL-CCNC: 35 U/L (ref 2–50)
ANION GAP SERPL CALC-SCNC: 8 MMOL/L (ref 0–11.9)
AST SERPL-CCNC: 28 U/L (ref 12–45)
BILIRUB SERPL-MCNC: 0.5 MG/DL (ref 0.1–1.5)
BUN SERPL-MCNC: 11 MG/DL (ref 8–22)
CALCIUM SERPL-MCNC: 9.8 MG/DL (ref 8.5–10.5)
CHLORIDE SERPL-SCNC: 102 MMOL/L (ref 96–112)
CO2 SERPL-SCNC: 28 MMOL/L (ref 20–33)
CREAT SERPL-MCNC: 0.94 MG/DL (ref 0.5–1.4)
GLOBULIN SER CALC-MCNC: 2.8 G/DL (ref 1.9–3.5)
GLUCOSE SERPL-MCNC: 105 MG/DL (ref 65–99)
POTASSIUM SERPL-SCNC: 4.2 MMOL/L (ref 3.6–5.5)
PROT SERPL-MCNC: 7 G/DL (ref 6–8.2)
SODIUM SERPL-SCNC: 138 MMOL/L (ref 135–145)

## 2020-02-24 PROCEDURE — 80053 COMPREHEN METABOLIC PANEL: CPT

## 2020-02-24 PROCEDURE — 36415 COLL VENOUS BLD VENIPUNCTURE: CPT

## 2020-03-09 ENCOUNTER — HOSPITAL ENCOUNTER (OUTPATIENT)
Dept: LAB | Facility: MEDICAL CENTER | Age: 50
End: 2020-03-09
Attending: INTERNAL MEDICINE
Payer: COMMERCIAL

## 2020-03-09 PROCEDURE — 80053 COMPREHEN METABOLIC PANEL: CPT

## 2020-03-10 LAB
ALBUMIN SERPL BCP-MCNC: 3.7 G/DL (ref 3.2–4.9)
ALBUMIN/GLOB SERPL: 1.9 G/DL
ALP SERPL-CCNC: 89 U/L (ref 30–99)
ALT SERPL-CCNC: 25 U/L (ref 2–50)
ANION GAP SERPL CALC-SCNC: 18 MMOL/L (ref 7–16)
AST SERPL-CCNC: 22 U/L (ref 12–45)
BILIRUB SERPL-MCNC: 0.4 MG/DL (ref 0.1–1.5)
BUN SERPL-MCNC: 19 MG/DL (ref 8–22)
CALCIUM SERPL-MCNC: 9 MG/DL (ref 8.4–10.2)
CHLORIDE SERPL-SCNC: 97 MMOL/L (ref 96–112)
CO2 SERPL-SCNC: 23 MMOL/L (ref 20–33)
CREAT SERPL-MCNC: 1 MG/DL (ref 0.5–1.4)
GLOBULIN SER CALC-MCNC: 1.9 G/DL (ref 1.9–3.5)
GLUCOSE SERPL-MCNC: 104 MG/DL (ref 65–99)
POTASSIUM SERPL-SCNC: 3 MMOL/L (ref 3.6–5.5)
PROT SERPL-MCNC: 5.6 G/DL (ref 6–8.2)
SODIUM SERPL-SCNC: 138 MMOL/L (ref 135–145)

## 2020-03-16 ENCOUNTER — HOSPITAL ENCOUNTER (OUTPATIENT)
Dept: LAB | Facility: MEDICAL CENTER | Age: 50
End: 2020-03-16
Attending: INTERNAL MEDICINE
Payer: COMMERCIAL

## 2020-03-16 LAB
ALBUMIN SERPL BCP-MCNC: 3.5 G/DL (ref 3.2–4.9)
ALBUMIN/GLOB SERPL: 1.6 G/DL
ALP SERPL-CCNC: 56 U/L (ref 30–99)
ALT SERPL-CCNC: 20 U/L (ref 2–50)
ANION GAP SERPL CALC-SCNC: 6 MMOL/L (ref 7–16)
AST SERPL-CCNC: 16 U/L (ref 12–45)
BILIRUB SERPL-MCNC: 0.4 MG/DL (ref 0.1–1.5)
BUN SERPL-MCNC: 17 MG/DL (ref 8–22)
CALCIUM SERPL-MCNC: 9.1 MG/DL (ref 8.5–10.5)
CHLORIDE SERPL-SCNC: 104 MMOL/L (ref 96–112)
CO2 SERPL-SCNC: 30 MMOL/L (ref 20–33)
CREAT SERPL-MCNC: 0.79 MG/DL (ref 0.5–1.4)
GLOBULIN SER CALC-MCNC: 2.2 G/DL (ref 1.9–3.5)
GLUCOSE SERPL-MCNC: 100 MG/DL (ref 65–99)
POTASSIUM SERPL-SCNC: 3.7 MMOL/L (ref 3.6–5.5)
PROT SERPL-MCNC: 5.7 G/DL (ref 6–8.2)
SODIUM SERPL-SCNC: 140 MMOL/L (ref 135–145)

## 2020-03-16 PROCEDURE — 80053 COMPREHEN METABOLIC PANEL: CPT

## 2020-03-16 PROCEDURE — 36415 COLL VENOUS BLD VENIPUNCTURE: CPT

## 2020-03-18 ENCOUNTER — HOSPITAL ENCOUNTER (OUTPATIENT)
Dept: LAB | Facility: MEDICAL CENTER | Age: 50
End: 2020-03-18
Attending: NURSE PRACTITIONER
Payer: COMMERCIAL

## 2020-03-18 LAB
ALBUMIN SERPL BCP-MCNC: 4.1 G/DL (ref 3.2–4.9)
ALBUMIN/GLOB SERPL: 1.8 G/DL
ALP SERPL-CCNC: 72 U/L (ref 30–99)
ALT SERPL-CCNC: 34 U/L (ref 2–50)
ANION GAP SERPL CALC-SCNC: 12 MMOL/L (ref 7–16)
AST SERPL-CCNC: 21 U/L (ref 12–45)
BILIRUB SERPL-MCNC: 0.3 MG/DL (ref 0.1–1.5)
BUN SERPL-MCNC: 14 MG/DL (ref 8–22)
CALCIUM SERPL-MCNC: 9.7 MG/DL (ref 8.5–10.5)
CHLORIDE SERPL-SCNC: 103 MMOL/L (ref 96–112)
CO2 SERPL-SCNC: 22 MMOL/L (ref 20–33)
CREAT SERPL-MCNC: 0.7 MG/DL (ref 0.5–1.4)
GLOBULIN SER CALC-MCNC: 2.3 G/DL (ref 1.9–3.5)
GLUCOSE SERPL-MCNC: 97 MG/DL (ref 65–99)
POTASSIUM SERPL-SCNC: 4.2 MMOL/L (ref 3.6–5.5)
PROT SERPL-MCNC: 6.4 G/DL (ref 6–8.2)
SODIUM SERPL-SCNC: 137 MMOL/L (ref 135–145)

## 2020-03-18 PROCEDURE — 80053 COMPREHEN METABOLIC PANEL: CPT

## 2020-04-06 ENCOUNTER — HOSPITAL ENCOUNTER (OUTPATIENT)
Dept: LAB | Facility: MEDICAL CENTER | Age: 50
End: 2020-04-06
Attending: INTERNAL MEDICINE
Payer: COMMERCIAL

## 2020-04-06 LAB
ALBUMIN SERPL BCP-MCNC: 3.9 G/DL (ref 3.2–4.9)
ALBUMIN/GLOB SERPL: 1.6 G/DL
ALP SERPL-CCNC: 82 U/L (ref 30–99)
ALT SERPL-CCNC: 23 U/L (ref 2–50)
ANION GAP SERPL CALC-SCNC: 13 MMOL/L (ref 7–16)
AST SERPL-CCNC: 22 U/L (ref 12–45)
BILIRUB SERPL-MCNC: 0.8 MG/DL (ref 0.1–1.5)
BUN SERPL-MCNC: 8 MG/DL (ref 8–22)
CALCIUM SERPL-MCNC: 9 MG/DL (ref 8.5–10.5)
CHLORIDE SERPL-SCNC: 103 MMOL/L (ref 96–112)
CO2 SERPL-SCNC: 26 MMOL/L (ref 20–33)
CREAT SERPL-MCNC: 0.65 MG/DL (ref 0.5–1.4)
GLOBULIN SER CALC-MCNC: 2.5 G/DL (ref 1.9–3.5)
GLUCOSE SERPL-MCNC: 98 MG/DL (ref 65–99)
POTASSIUM SERPL-SCNC: 3.7 MMOL/L (ref 3.6–5.5)
PROT SERPL-MCNC: 6.4 G/DL (ref 6–8.2)
SODIUM SERPL-SCNC: 142 MMOL/L (ref 135–145)

## 2020-04-06 PROCEDURE — 36415 COLL VENOUS BLD VENIPUNCTURE: CPT

## 2020-04-06 PROCEDURE — 80053 COMPREHEN METABOLIC PANEL: CPT

## 2020-04-14 ENCOUNTER — APPOINTMENT (OUTPATIENT)
Dept: RADIOLOGY | Facility: MEDICAL CENTER | Age: 50
End: 2020-04-14
Attending: INTERNAL MEDICINE
Payer: COMMERCIAL

## 2020-04-24 ENCOUNTER — HOSPITAL ENCOUNTER (OUTPATIENT)
Dept: RADIOLOGY | Facility: MEDICAL CENTER | Age: 50
End: 2020-04-24
Attending: INTERNAL MEDICINE
Payer: COMMERCIAL

## 2020-04-24 DIAGNOSIS — C50.812 MALIGNANT NEOPLASM OF OVERLAPPING SITES OF LEFT FEMALE BREAST, UNSPECIFIED ESTROGEN RECEPTOR STATUS (HCC): ICD-10-CM

## 2020-04-24 PROCEDURE — 70551 MRI BRAIN STEM W/O DYE: CPT

## 2020-04-24 NOTE — PROGRESS NOTES
Pt here for a brain MRI with and without contrast.  Without contrast images obtained.  Attempted contrast administration; unable to obtain IV access after 4 attempts.  Pt declined to continue with another IV attempt; stating she has another MRI scheduled soon and could just do the 'with' portion of the brain during that visit. Pre contrast images submitted to radiologist.

## 2020-04-27 ENCOUNTER — HOSPITAL ENCOUNTER (OUTPATIENT)
Dept: LAB | Facility: MEDICAL CENTER | Age: 50
End: 2020-04-27
Attending: INTERNAL MEDICINE
Payer: COMMERCIAL

## 2020-04-27 LAB
ALBUMIN SERPL BCP-MCNC: 3.9 G/DL (ref 3.2–4.9)
ALBUMIN/GLOB SERPL: 1.7 G/DL
ALP SERPL-CCNC: 77 U/L (ref 30–99)
ALT SERPL-CCNC: 31 U/L (ref 2–50)
ANION GAP SERPL CALC-SCNC: 12 MMOL/L (ref 7–16)
AST SERPL-CCNC: 32 U/L (ref 12–45)
BILIRUB SERPL-MCNC: 0.5 MG/DL (ref 0.1–1.5)
BUN SERPL-MCNC: 15 MG/DL (ref 8–22)
CALCIUM SERPL-MCNC: 8.7 MG/DL (ref 8.5–10.5)
CHLORIDE SERPL-SCNC: 101 MMOL/L (ref 96–112)
CO2 SERPL-SCNC: 28 MMOL/L (ref 20–33)
CREAT SERPL-MCNC: 0.67 MG/DL (ref 0.5–1.4)
GLOBULIN SER CALC-MCNC: 2.3 G/DL (ref 1.9–3.5)
GLUCOSE SERPL-MCNC: 105 MG/DL (ref 65–99)
POTASSIUM SERPL-SCNC: 3.4 MMOL/L (ref 3.6–5.5)
PROT SERPL-MCNC: 6.2 G/DL (ref 6–8.2)
SODIUM SERPL-SCNC: 141 MMOL/L (ref 135–145)

## 2020-04-27 PROCEDURE — 36415 COLL VENOUS BLD VENIPUNCTURE: CPT

## 2020-04-27 PROCEDURE — 80053 COMPREHEN METABOLIC PANEL: CPT

## 2020-05-04 DIAGNOSIS — G47.00 INSOMNIA, UNSPECIFIED TYPE: ICD-10-CM

## 2020-05-04 RX ORDER — TRAZODONE HYDROCHLORIDE 50 MG/1
50 TABLET ORAL NIGHTLY PRN
Qty: 30 TAB | Refills: 3 | Status: ON HOLD
Start: 2020-05-04 | End: 2020-06-11

## 2020-05-07 ENCOUNTER — HOSPITAL ENCOUNTER (OUTPATIENT)
Dept: LAB | Facility: MEDICAL CENTER | Age: 50
End: 2020-05-07
Attending: INTERNAL MEDICINE
Payer: COMMERCIAL

## 2020-05-07 LAB
ALBUMIN SERPL BCP-MCNC: 3.7 G/DL (ref 3.2–4.9)
ALBUMIN/GLOB SERPL: 1.9 G/DL
ALP SERPL-CCNC: 80 U/L (ref 30–99)
ALT SERPL-CCNC: 19 U/L (ref 2–50)
ANION GAP SERPL CALC-SCNC: 14 MMOL/L (ref 7–16)
AST SERPL-CCNC: 17 U/L (ref 12–45)
BILIRUB SERPL-MCNC: 0.6 MG/DL (ref 0.1–1.5)
BUN SERPL-MCNC: 13 MG/DL (ref 8–22)
CALCIUM SERPL-MCNC: 8.2 MG/DL (ref 8.5–10.5)
CHLORIDE SERPL-SCNC: 95 MMOL/L (ref 96–112)
CO2 SERPL-SCNC: 23 MMOL/L (ref 20–33)
CREAT SERPL-MCNC: 0.77 MG/DL (ref 0.5–1.4)
FERRITIN SERPL-MCNC: 684 NG/ML (ref 10–291)
FOLATE SERPL-MCNC: 4.4 NG/ML
GLOBULIN SER CALC-MCNC: 1.9 G/DL (ref 1.9–3.5)
GLUCOSE SERPL-MCNC: 103 MG/DL (ref 65–99)
HGB RETIC QN AUTO: 38.9 PG/CELL (ref 29–35)
IMM RETICS NFR: 13.9 % (ref 9.3–17.4)
IRON SATN MFR SERPL: 26 % (ref 15–55)
IRON SERPL-MCNC: 63 UG/DL (ref 40–170)
MAGNESIUM SERPL-MCNC: 1 MG/DL (ref 1.5–2.5)
POTASSIUM SERPL-SCNC: 3.1 MMOL/L (ref 3.6–5.5)
PROT SERPL-MCNC: 5.6 G/DL (ref 6–8.2)
RETICS # AUTO: 0.02 M/UL (ref 0.04–0.06)
RETICS/RBC NFR: 0.6 % (ref 0.8–2.1)
SODIUM SERPL-SCNC: 132 MMOL/L (ref 135–145)
TIBC SERPL-MCNC: 242 UG/DL (ref 250–450)
UIBC SERPL-MCNC: 179 UG/DL (ref 110–370)
VIT B12 SERPL-MCNC: 3207 PG/ML (ref 211–911)

## 2020-05-07 PROCEDURE — 82728 ASSAY OF FERRITIN: CPT

## 2020-05-07 PROCEDURE — 82607 VITAMIN B-12: CPT

## 2020-05-07 PROCEDURE — 82746 ASSAY OF FOLIC ACID SERUM: CPT

## 2020-05-07 PROCEDURE — 83735 ASSAY OF MAGNESIUM: CPT

## 2020-05-07 PROCEDURE — 85046 RETICYTE/HGB CONCENTRATE: CPT

## 2020-05-07 PROCEDURE — 83550 IRON BINDING TEST: CPT

## 2020-05-07 PROCEDURE — 83540 ASSAY OF IRON: CPT

## 2020-05-07 PROCEDURE — 80053 COMPREHEN METABOLIC PANEL: CPT

## 2020-05-15 ENCOUNTER — HOSPITAL ENCOUNTER (OUTPATIENT)
Dept: CARDIOLOGY | Facility: MEDICAL CENTER | Age: 50
End: 2020-05-15
Attending: INTERNAL MEDICINE
Payer: COMMERCIAL

## 2020-05-15 DIAGNOSIS — C50.812 MALIGNANT NEOPLASM OF OVERLAPPING SITES OF LEFT FEMALE BREAST, UNSPECIFIED ESTROGEN RECEPTOR STATUS (HCC): ICD-10-CM

## 2020-05-15 LAB
LV EJECT FRACT MOD 2C 99903: 51.03
LV EJECT FRACT MOD 4C 99902: 67.27
LV EJECT FRACT MOD BP 99901: 57.61

## 2020-05-15 PROCEDURE — 93306 TTE W/DOPPLER COMPLETE: CPT | Mod: 26 | Performed by: INTERNAL MEDICINE

## 2020-05-15 PROCEDURE — 93306 TTE W/DOPPLER COMPLETE: CPT

## 2020-05-18 ENCOUNTER — HOSPITAL ENCOUNTER (OUTPATIENT)
Dept: RADIOLOGY | Facility: MEDICAL CENTER | Age: 50
End: 2020-05-18
Attending: SURGERY
Payer: COMMERCIAL

## 2020-05-18 ENCOUNTER — HOSPITAL ENCOUNTER (OUTPATIENT)
Dept: LAB | Facility: MEDICAL CENTER | Age: 50
End: 2020-05-18
Attending: INTERNAL MEDICINE
Payer: COMMERCIAL

## 2020-05-18 DIAGNOSIS — C50.412 MALIGNANT NEOPLASM OF UPPER-OUTER QUADRANT OF LEFT FEMALE BREAST, UNSPECIFIED ESTROGEN RECEPTOR STATUS (HCC): ICD-10-CM

## 2020-05-18 LAB
ALBUMIN SERPL BCP-MCNC: 3.7 G/DL (ref 3.2–4.9)
ALBUMIN/GLOB SERPL: 1.4 G/DL
ALP SERPL-CCNC: 79 U/L (ref 30–99)
ALT SERPL-CCNC: 19 U/L (ref 2–50)
ANION GAP SERPL CALC-SCNC: 12 MMOL/L (ref 7–16)
AST SERPL-CCNC: 22 U/L (ref 12–45)
BILIRUB SERPL-MCNC: 0.6 MG/DL (ref 0.1–1.5)
BUN SERPL-MCNC: 9 MG/DL (ref 8–22)
CALCIUM SERPL-MCNC: 9.2 MG/DL (ref 8.5–10.5)
CHLORIDE SERPL-SCNC: 101 MMOL/L (ref 96–112)
CO2 SERPL-SCNC: 28 MMOL/L (ref 20–33)
CREAT SERPL-MCNC: 0.76 MG/DL (ref 0.5–1.4)
GLOBULIN SER CALC-MCNC: 2.6 G/DL (ref 1.9–3.5)
GLUCOSE SERPL-MCNC: 105 MG/DL (ref 65–99)
POTASSIUM SERPL-SCNC: 4 MMOL/L (ref 3.6–5.5)
PROT SERPL-MCNC: 6.3 G/DL (ref 6–8.2)
SODIUM SERPL-SCNC: 141 MMOL/L (ref 135–145)

## 2020-05-18 PROCEDURE — 700117 HCHG RX CONTRAST REV CODE 255: Performed by: SURGERY

## 2020-05-18 PROCEDURE — 36415 COLL VENOUS BLD VENIPUNCTURE: CPT

## 2020-05-18 PROCEDURE — A9576 INJ PROHANCE MULTIPACK: HCPCS | Performed by: SURGERY

## 2020-05-18 PROCEDURE — C8908 MRI W/O FOL W/CONT, BREAST,: HCPCS

## 2020-05-18 PROCEDURE — 80053 COMPREHEN METABOLIC PANEL: CPT

## 2020-05-18 RX ADMIN — GADOTERIDOL 25 ML: 279.3 INJECTION, SOLUTION INTRAVENOUS at 12:43

## 2020-06-08 ENCOUNTER — OFFICE VISIT (OUTPATIENT)
Dept: ADMISSIONS | Facility: MEDICAL CENTER | Age: 50
End: 2020-06-08
Attending: INTERNAL MEDICINE
Payer: COMMERCIAL

## 2020-06-08 DIAGNOSIS — Z01.812 PRE-OPERATIVE LABORATORY EXAMINATION: ICD-10-CM

## 2020-06-08 LAB — COVID ORDER STATUS COVID19: NORMAL

## 2020-06-08 PROCEDURE — C9803 HOPD COVID-19 SPEC COLLECT: HCPCS

## 2020-06-09 RX ORDER — FOLIC ACID 0.8 MG
500 TABLET ORAL EVERY MORNING
COMMUNITY
End: 2022-05-05

## 2020-06-09 ASSESSMENT — FIBROSIS 4 INDEX: FIB4 SCORE: 1.34

## 2020-06-10 ENCOUNTER — HOSPITAL ENCOUNTER (OUTPATIENT)
Dept: LAB | Facility: MEDICAL CENTER | Age: 50
End: 2020-06-10
Attending: INTERNAL MEDICINE
Payer: COMMERCIAL

## 2020-06-10 LAB
ALBUMIN SERPL BCP-MCNC: 4.1 G/DL (ref 3.2–4.9)
ALBUMIN/GLOB SERPL: 1.9 G/DL
ALP SERPL-CCNC: 79 U/L (ref 30–99)
ALT SERPL-CCNC: 21 U/L (ref 2–50)
ANION GAP SERPL CALC-SCNC: 13 MMOL/L (ref 7–16)
AST SERPL-CCNC: 20 U/L (ref 12–45)
BILIRUB SERPL-MCNC: 0.5 MG/DL (ref 0.1–1.5)
BUN SERPL-MCNC: 15 MG/DL (ref 8–22)
CALCIUM SERPL-MCNC: 9.4 MG/DL (ref 8.5–10.5)
CHLORIDE SERPL-SCNC: 105 MMOL/L (ref 96–112)
CO2 SERPL-SCNC: 24 MMOL/L (ref 20–33)
CREAT SERPL-MCNC: 0.75 MG/DL (ref 0.5–1.4)
GLOBULIN SER CALC-MCNC: 2.2 G/DL (ref 1.9–3.5)
GLUCOSE SERPL-MCNC: 97 MG/DL (ref 65–99)
POTASSIUM SERPL-SCNC: 4.2 MMOL/L (ref 3.6–5.5)
PROT SERPL-MCNC: 6.3 G/DL (ref 6–8.2)
SARS-COV-2 RNA RESP QL NAA+PROBE: NOT DETECTED
SODIUM SERPL-SCNC: 142 MMOL/L (ref 135–145)
SPECIMEN SOURCE: NORMAL

## 2020-06-10 PROCEDURE — 80053 COMPREHEN METABOLIC PANEL: CPT

## 2020-06-10 NOTE — OR NURSING
COVID-19 Pre-surgery screenin. Do you have an undiagnosed respiratory illness or symptoms such as coughing or sneezing?no (Yes/No)  a. Onset of Sx no  b. Acute vs. chronic respiratory illness no    2. Do you have an unexplained fever greater than 100.4 degrees Fahrenheit or 38 degrees Celsius?     no (Yes/No)    3. Have you had direct exposure to a patient who tested positive for Covid-19?    no (Yes/No)    4. Have you traveled outside [facility] in the last 14 days? no    5. Have you had any loss of your sense of taste or smell? Have you had N/V or sore throat? no    Patient has been informed of visitor policy and asked to wear a mask upon entering the hospital   yes (Yes/No)

## 2020-06-11 ENCOUNTER — APPOINTMENT (OUTPATIENT)
Dept: RADIOLOGY | Facility: MEDICAL CENTER | Age: 50
End: 2020-06-11
Attending: SURGERY
Payer: COMMERCIAL

## 2020-06-11 ENCOUNTER — HOSPITAL ENCOUNTER (OUTPATIENT)
Facility: MEDICAL CENTER | Age: 50
End: 2020-06-11
Attending: SURGERY | Admitting: SURGERY
Payer: COMMERCIAL

## 2020-06-11 ENCOUNTER — ANESTHESIA EVENT (OUTPATIENT)
Dept: SURGERY | Facility: MEDICAL CENTER | Age: 50
End: 2020-06-11
Payer: COMMERCIAL

## 2020-06-11 ENCOUNTER — ANESTHESIA (OUTPATIENT)
Dept: SURGERY | Facility: MEDICAL CENTER | Age: 50
End: 2020-06-11
Payer: COMMERCIAL

## 2020-06-11 VITALS
HEIGHT: 65 IN | WEIGHT: 235.67 LBS | SYSTOLIC BLOOD PRESSURE: 121 MMHG | RESPIRATION RATE: 16 BRPM | HEART RATE: 92 BPM | DIASTOLIC BLOOD PRESSURE: 86 MMHG | TEMPERATURE: 98 F | BODY MASS INDEX: 39.27 KG/M2 | OXYGEN SATURATION: 97 %

## 2020-06-11 DIAGNOSIS — C50.412 MALIGNANT NEOPLASM OF UPPER-OUTER QUADRANT OF LEFT FEMALE BREAST, UNSPECIFIED ESTROGEN RECEPTOR STATUS (HCC): ICD-10-CM

## 2020-06-11 LAB
ABO + RH BLD: NORMAL
ABO GROUP BLD: NORMAL
BLD GP AB SCN SERPL QL: NORMAL
ERYTHROCYTE [DISTWIDTH] IN BLOOD BY AUTOMATED COUNT: 56 FL (ref 35.9–50)
ERYTHROCYTE [DISTWIDTH] IN BLOOD BY AUTOMATED COUNT: 56.5 FL (ref 35.9–50)
HCT VFR BLD AUTO: 27.4 % (ref 37–47)
HCT VFR BLD AUTO: 28.1 % (ref 37–47)
HGB BLD-MCNC: 8.7 G/DL (ref 12–16)
HGB BLD-MCNC: 8.9 G/DL (ref 12–16)
MCH RBC QN AUTO: 33 PG (ref 27–33)
MCH RBC QN AUTO: 33.2 PG (ref 27–33)
MCHC RBC AUTO-ENTMCNC: 31.7 G/DL (ref 33.6–35)
MCHC RBC AUTO-ENTMCNC: 31.8 G/DL (ref 33.6–35)
MCV RBC AUTO: 103.8 FL (ref 81.4–97.8)
MCV RBC AUTO: 104.9 FL (ref 81.4–97.8)
PLATELET # BLD AUTO: 205 K/UL (ref 164–446)
PLATELET # BLD AUTO: 207 K/UL (ref 164–446)
PMV BLD AUTO: 10.1 FL (ref 9–12.9)
PMV BLD AUTO: 10.3 FL (ref 9–12.9)
RBC # BLD AUTO: 2.64 M/UL (ref 4.2–5.4)
RBC # BLD AUTO: 2.68 M/UL (ref 4.2–5.4)
RH BLD: NORMAL
WBC # BLD AUTO: 10.3 K/UL (ref 4.8–10.8)
WBC # BLD AUTO: 6.4 K/UL (ref 4.8–10.8)

## 2020-06-11 PROCEDURE — 160009 HCHG ANES TIME/MIN: Performed by: SURGERY

## 2020-06-11 PROCEDURE — 502594 HCHG SCISSOR HANDLE, HARMONIC ACE: Performed by: SURGERY

## 2020-06-11 PROCEDURE — 501497 HCHG SURGICLIP: Performed by: SURGERY

## 2020-06-11 PROCEDURE — 160002 HCHG RECOVERY MINUTES (STAT): Performed by: SURGERY

## 2020-06-11 PROCEDURE — 700105 HCHG RX REV CODE 258: Performed by: ANESTHESIOLOGY

## 2020-06-11 PROCEDURE — 700111 HCHG RX REV CODE 636 W/ 250 OVERRIDE (IP): Performed by: ANESTHESIOLOGY

## 2020-06-11 PROCEDURE — 36415 COLL VENOUS BLD VENIPUNCTURE: CPT

## 2020-06-11 PROCEDURE — 500389 HCHG DRAIN, RESERVOIR SUCT JP 100CC: Performed by: SURGERY

## 2020-06-11 PROCEDURE — 86850 RBC ANTIBODY SCREEN: CPT

## 2020-06-11 PROCEDURE — 700105 HCHG RX REV CODE 258: Performed by: SURGERY

## 2020-06-11 PROCEDURE — 160047 HCHG PACU  - EA ADDL 30 MINS PHASE II: Performed by: SURGERY

## 2020-06-11 PROCEDURE — 160029 HCHG SURGERY MINUTES - 1ST 30 MINS LEVEL 4: Performed by: SURGERY

## 2020-06-11 PROCEDURE — 88305 TISSUE EXAM BY PATHOLOGIST: CPT

## 2020-06-11 PROCEDURE — 160046 HCHG PACU - 1ST 60 MINS PHASE II: Performed by: SURGERY

## 2020-06-11 PROCEDURE — 500368 HCHG DRAIN, 7MM FLAT-FLUTED: Performed by: SURGERY

## 2020-06-11 PROCEDURE — 86901 BLOOD TYPING SEROLOGIC RH(D): CPT

## 2020-06-11 PROCEDURE — 501838 HCHG SUTURE GENERAL: Performed by: SURGERY

## 2020-06-11 PROCEDURE — 700111 HCHG RX REV CODE 636 W/ 250 OVERRIDE (IP): Performed by: SURGERY

## 2020-06-11 PROCEDURE — 500054 HCHG BANDAGE, ELASTIC 6: Performed by: SURGERY

## 2020-06-11 PROCEDURE — 160025 RECOVERY II MINUTES (STATS): Performed by: SURGERY

## 2020-06-11 PROCEDURE — 88307 TISSUE EXAM BY PATHOLOGIST: CPT | Mod: 59

## 2020-06-11 PROCEDURE — 160035 HCHG PACU - 1ST 60 MINS PHASE I: Performed by: SURGERY

## 2020-06-11 PROCEDURE — 160048 HCHG OR STATISTICAL LEVEL 1-5: Performed by: SURGERY

## 2020-06-11 PROCEDURE — 700101 HCHG RX REV CODE 250: Performed by: ANESTHESIOLOGY

## 2020-06-11 PROCEDURE — 160041 HCHG SURGERY MINUTES - EA ADDL 1 MIN LEVEL 4: Performed by: SURGERY

## 2020-06-11 PROCEDURE — 76098 X-RAY EXAM SURGICAL SPECIMEN: CPT | Mod: LT

## 2020-06-11 PROCEDURE — 700102 HCHG RX REV CODE 250 W/ 637 OVERRIDE(OP): Performed by: SURGERY

## 2020-06-11 PROCEDURE — 700102 HCHG RX REV CODE 250 W/ 637 OVERRIDE(OP): Performed by: ANESTHESIOLOGY

## 2020-06-11 PROCEDURE — 160036 HCHG PACU - EA ADDL 30 MINS PHASE I: Performed by: SURGERY

## 2020-06-11 PROCEDURE — 700101 HCHG RX REV CODE 250: Performed by: SURGERY

## 2020-06-11 PROCEDURE — 86900 BLOOD TYPING SEROLOGIC ABO: CPT

## 2020-06-11 PROCEDURE — A9270 NON-COVERED ITEM OR SERVICE: HCPCS | Performed by: ANESTHESIOLOGY

## 2020-06-11 PROCEDURE — 38792 RA TRACER ID OF SENTINL NODE: CPT | Mod: LT

## 2020-06-11 PROCEDURE — A9270 NON-COVERED ITEM OR SERVICE: HCPCS | Performed by: SURGERY

## 2020-06-11 PROCEDURE — 88331 PATH CONSLTJ SURG 1 BLK 1SPC: CPT | Mod: 91

## 2020-06-11 PROCEDURE — 85027 COMPLETE CBC AUTOMATED: CPT

## 2020-06-11 RX ORDER — SODIUM CHLORIDE, SODIUM GLUCONATE, SODIUM ACETATE, POTASSIUM CHLORIDE AND MAGNESIUM CHLORIDE 526; 502; 368; 37; 30 MG/100ML; MG/100ML; MG/100ML; MG/100ML; MG/100ML
INJECTION, SOLUTION INTRAVENOUS
Status: DISCONTINUED | OUTPATIENT
Start: 2020-06-11 | End: 2020-06-11 | Stop reason: SURG

## 2020-06-11 RX ORDER — MIDAZOLAM HYDROCHLORIDE 1 MG/ML
INJECTION INTRAMUSCULAR; INTRAVENOUS PRN
Status: DISCONTINUED | OUTPATIENT
Start: 2020-06-11 | End: 2020-06-11 | Stop reason: SURG

## 2020-06-11 RX ORDER — ROCURONIUM BROMIDE 10 MG/ML
INJECTION, SOLUTION INTRAVENOUS PRN
Status: DISCONTINUED | OUTPATIENT
Start: 2020-06-11 | End: 2020-06-11 | Stop reason: SURG

## 2020-06-11 RX ORDER — HYDROMORPHONE HYDROCHLORIDE 2 MG/ML
INJECTION, SOLUTION INTRAMUSCULAR; INTRAVENOUS; SUBCUTANEOUS PRN
Status: DISCONTINUED | OUTPATIENT
Start: 2020-06-11 | End: 2020-06-11 | Stop reason: SURG

## 2020-06-11 RX ORDER — DEXAMETHASONE SODIUM PHOSPHATE 4 MG/ML
INJECTION, SOLUTION INTRA-ARTICULAR; INTRALESIONAL; INTRAMUSCULAR; INTRAVENOUS; SOFT TISSUE PRN
Status: DISCONTINUED | OUTPATIENT
Start: 2020-06-11 | End: 2020-06-11 | Stop reason: SURG

## 2020-06-11 RX ORDER — LABETALOL HYDROCHLORIDE 5 MG/ML
5 INJECTION, SOLUTION INTRAVENOUS
Status: DISCONTINUED | OUTPATIENT
Start: 2020-06-11 | End: 2020-06-11 | Stop reason: HOSPADM

## 2020-06-11 RX ORDER — DIPHENOXYLATE HYDROCHLORIDE AND ATROPINE SULFATE 2.5; .025 MG/1; MG/1
1 TABLET ORAL 3 TIMES DAILY PRN
COMMUNITY
Start: 2020-05-07 | End: 2021-08-30

## 2020-06-11 RX ORDER — HALOPERIDOL 5 MG/ML
1 INJECTION INTRAMUSCULAR
Status: DISCONTINUED | OUTPATIENT
Start: 2020-06-11 | End: 2020-06-11 | Stop reason: HOSPADM

## 2020-06-11 RX ORDER — OXYCODONE HCL 5 MG/5 ML
5 SOLUTION, ORAL ORAL
Status: COMPLETED | OUTPATIENT
Start: 2020-06-11 | End: 2020-06-11

## 2020-06-11 RX ORDER — OXYCODONE HCL 5 MG/5 ML
10 SOLUTION, ORAL ORAL
Status: COMPLETED | OUTPATIENT
Start: 2020-06-11 | End: 2020-06-11

## 2020-06-11 RX ORDER — DIPHENHYDRAMINE HYDROCHLORIDE 50 MG/ML
12.5 INJECTION INTRAMUSCULAR; INTRAVENOUS
Status: DISCONTINUED | OUTPATIENT
Start: 2020-06-11 | End: 2020-06-11 | Stop reason: HOSPADM

## 2020-06-11 RX ORDER — HYDROMORPHONE HYDROCHLORIDE 1 MG/ML
0.4 INJECTION, SOLUTION INTRAMUSCULAR; INTRAVENOUS; SUBCUTANEOUS
Status: DISCONTINUED | OUTPATIENT
Start: 2020-06-11 | End: 2020-06-11 | Stop reason: HOSPADM

## 2020-06-11 RX ORDER — LIDOCAINE HYDROCHLORIDE 40 MG/ML
SOLUTION TOPICAL PRN
Status: DISCONTINUED | OUTPATIENT
Start: 2020-06-11 | End: 2020-06-11 | Stop reason: SURG

## 2020-06-11 RX ORDER — HYDROMORPHONE HYDROCHLORIDE 1 MG/ML
0.2 INJECTION, SOLUTION INTRAMUSCULAR; INTRAVENOUS; SUBCUTANEOUS
Status: DISCONTINUED | OUTPATIENT
Start: 2020-06-11 | End: 2020-06-11 | Stop reason: HOSPADM

## 2020-06-11 RX ORDER — MEPERIDINE HYDROCHLORIDE 25 MG/ML
6.25 INJECTION INTRAMUSCULAR; INTRAVENOUS; SUBCUTANEOUS
Status: DISCONTINUED | OUTPATIENT
Start: 2020-06-11 | End: 2020-06-11 | Stop reason: HOSPADM

## 2020-06-11 RX ORDER — ACETAMINOPHEN 500 MG
1000 TABLET ORAL ONCE
Status: COMPLETED | OUTPATIENT
Start: 2020-06-11 | End: 2020-06-11

## 2020-06-11 RX ORDER — HYDROMORPHONE HYDROCHLORIDE 1 MG/ML
0.1 INJECTION, SOLUTION INTRAMUSCULAR; INTRAVENOUS; SUBCUTANEOUS
Status: DISCONTINUED | OUTPATIENT
Start: 2020-06-11 | End: 2020-06-11 | Stop reason: HOSPADM

## 2020-06-11 RX ORDER — GABAPENTIN 300 MG/1
300 CAPSULE ORAL ONCE
Status: COMPLETED | OUTPATIENT
Start: 2020-06-11 | End: 2020-06-11

## 2020-06-11 RX ORDER — CELECOXIB 200 MG/1
200 CAPSULE ORAL ONCE
Status: COMPLETED | OUTPATIENT
Start: 2020-06-11 | End: 2020-06-11

## 2020-06-11 RX ORDER — ALPRAZOLAM 0.25 MG/1
0.25 TABLET ORAL PRN
Status: DISCONTINUED | OUTPATIENT
Start: 2020-06-11 | End: 2020-06-11 | Stop reason: HOSPADM

## 2020-06-11 RX ORDER — LORAZEPAM 0.5 MG/1
0.5 TABLET ORAL
COMMUNITY
Start: 2020-04-08 | End: 2020-07-11

## 2020-06-11 RX ORDER — LIDOCAINE AND PRILOCAINE 25; 25 MG/G; MG/G
CREAM TOPICAL ONCE
Status: COMPLETED | OUTPATIENT
Start: 2020-06-11 | End: 2020-06-11

## 2020-06-11 RX ORDER — CEFAZOLIN SODIUM 1 G/3ML
INJECTION, POWDER, FOR SOLUTION INTRAMUSCULAR; INTRAVENOUS PRN
Status: DISCONTINUED | OUTPATIENT
Start: 2020-06-11 | End: 2020-06-11 | Stop reason: SURG

## 2020-06-11 RX ORDER — BUPIVACAINE HYDROCHLORIDE AND EPINEPHRINE 5; 5 MG/ML; UG/ML
INJECTION, SOLUTION EPIDURAL; INTRACAUDAL; PERINEURAL
Status: DISCONTINUED | OUTPATIENT
Start: 2020-06-11 | End: 2020-06-11 | Stop reason: HOSPADM

## 2020-06-11 RX ORDER — ISOSULFAN BLUE 50 MG/5ML
INJECTION, SOLUTION SUBCUTANEOUS
Status: DISCONTINUED | OUTPATIENT
Start: 2020-06-11 | End: 2020-06-11 | Stop reason: HOSPADM

## 2020-06-11 RX ORDER — ONDANSETRON 2 MG/ML
INJECTION INTRAMUSCULAR; INTRAVENOUS PRN
Status: DISCONTINUED | OUTPATIENT
Start: 2020-06-11 | End: 2020-06-11 | Stop reason: SURG

## 2020-06-11 RX ORDER — SODIUM CHLORIDE, SODIUM LACTATE, POTASSIUM CHLORIDE, CALCIUM CHLORIDE 600; 310; 30; 20 MG/100ML; MG/100ML; MG/100ML; MG/100ML
INJECTION, SOLUTION INTRAVENOUS CONTINUOUS
Status: DISCONTINUED | OUTPATIENT
Start: 2020-06-11 | End: 2020-06-11 | Stop reason: HOSPADM

## 2020-06-11 RX ORDER — SODIUM CHLORIDE, SODIUM LACTATE, POTASSIUM CHLORIDE, CALCIUM CHLORIDE 600; 310; 30; 20 MG/100ML; MG/100ML; MG/100ML; MG/100ML
INJECTION, SOLUTION INTRAVENOUS CONTINUOUS
Status: ACTIVE | OUTPATIENT
Start: 2020-06-11 | End: 2020-06-11

## 2020-06-11 RX ORDER — ONDANSETRON 2 MG/ML
4 INJECTION INTRAMUSCULAR; INTRAVENOUS
Status: COMPLETED | OUTPATIENT
Start: 2020-06-11 | End: 2020-06-11

## 2020-06-11 RX ADMIN — SODIUM CHLORIDE, SODIUM GLUCONATE, SODIUM ACETATE, POTASSIUM CHLORIDE AND MAGNESIUM CHLORIDE: 526; 502; 368; 37; 30 INJECTION, SOLUTION INTRAVENOUS at 14:32

## 2020-06-11 RX ADMIN — LIDOCAINE HYDROCHLORIDE 0.5 ML: 10 INJECTION, SOLUTION EPIDURAL; INFILTRATION; INTRACAUDAL at 07:25

## 2020-06-11 RX ADMIN — CEFAZOLIN 2 G: 330 INJECTION, POWDER, FOR SOLUTION INTRAMUSCULAR; INTRAVENOUS at 12:45

## 2020-06-11 RX ADMIN — FENTANYL CITRATE 50 MCG: 50 INJECTION INTRAMUSCULAR; INTRAVENOUS at 16:15

## 2020-06-11 RX ADMIN — FENTANYL CITRATE 50 MCG: 50 INJECTION INTRAMUSCULAR; INTRAVENOUS at 16:53

## 2020-06-11 RX ADMIN — FENTANYL CITRATE 100 MCG: 50 INJECTION, SOLUTION INTRAMUSCULAR; INTRAVENOUS at 12:40

## 2020-06-11 RX ADMIN — FENTANYL CITRATE 100 MCG: 50 INJECTION, SOLUTION INTRAMUSCULAR; INTRAVENOUS at 13:55

## 2020-06-11 RX ADMIN — HALOPERIDOL LACTATE 1 MG: 5 INJECTION, SOLUTION INTRAMUSCULAR at 17:26

## 2020-06-11 RX ADMIN — SODIUM CHLORIDE, SODIUM GLUCONATE, SODIUM ACETATE, POTASSIUM CHLORIDE AND MAGNESIUM CHLORIDE: 526; 502; 368; 37; 30 INJECTION, SOLUTION INTRAVENOUS at 13:55

## 2020-06-11 RX ADMIN — EPHEDRINE SULFATE 10 MG: 50 INJECTION, SOLUTION INTRAVENOUS at 14:05

## 2020-06-11 RX ADMIN — MIDAZOLAM HYDROCHLORIDE 2 MG: 1 INJECTION, SOLUTION INTRAMUSCULAR; INTRAVENOUS at 12:37

## 2020-06-11 RX ADMIN — FENTANYL CITRATE 50 MCG: 50 INJECTION INTRAMUSCULAR; INTRAVENOUS at 16:46

## 2020-06-11 RX ADMIN — SODIUM CHLORIDE, POTASSIUM CHLORIDE, SODIUM LACTATE AND CALCIUM CHLORIDE: 600; 310; 30; 20 INJECTION, SOLUTION INTRAVENOUS at 07:31

## 2020-06-11 RX ADMIN — ONDANSETRON 4 MG: 2 INJECTION INTRAMUSCULAR; INTRAVENOUS at 15:24

## 2020-06-11 RX ADMIN — SUGAMMADEX 200 MG: 100 INJECTION, SOLUTION INTRAVENOUS at 13:49

## 2020-06-11 RX ADMIN — ALPRAZOLAM 0.25 MG: 0.25 TABLET ORAL at 07:30

## 2020-06-11 RX ADMIN — DEXAMETHASONE SODIUM PHOSPHATE 8 MG: 4 INJECTION, SOLUTION INTRA-ARTICULAR; INTRALESIONAL; INTRAMUSCULAR; INTRAVENOUS; SOFT TISSUE at 12:58

## 2020-06-11 RX ADMIN — ONDANSETRON 4 MG: 2 INJECTION INTRAMUSCULAR; INTRAVENOUS at 16:09

## 2020-06-11 RX ADMIN — LIDOCAINE AND PRILOCAINE 1 TUBE: 25; 25 CREAM TOPICAL at 07:30

## 2020-06-11 RX ADMIN — FENTANYL CITRATE 50 MCG: 50 INJECTION INTRAMUSCULAR; INTRAVENOUS at 16:27

## 2020-06-11 RX ADMIN — CELECOXIB 200 MG: 200 CAPSULE ORAL at 11:07

## 2020-06-11 RX ADMIN — HYDROMORPHONE HYDROCHLORIDE 1 MG: 2 INJECTION, SOLUTION INTRAMUSCULAR; INTRAVENOUS; SUBCUTANEOUS at 13:18

## 2020-06-11 RX ADMIN — GABAPENTIN 300 MG: 300 CAPSULE ORAL at 11:07

## 2020-06-11 RX ADMIN — FENTANYL CITRATE 50 MCG: 50 INJECTION, SOLUTION INTRAMUSCULAR; INTRAVENOUS at 15:30

## 2020-06-11 RX ADMIN — OXYCODONE HYDROCHLORIDE 10 MG: 5 SOLUTION ORAL at 16:24

## 2020-06-11 RX ADMIN — POVIDONE-IODINE 15 ML: 10 SOLUTION TOPICAL at 07:30

## 2020-06-11 RX ADMIN — ROCURONIUM BROMIDE 80 MG: 10 INJECTION, SOLUTION INTRAVENOUS at 12:46

## 2020-06-11 RX ADMIN — LIDOCAINE HYDROCHLORIDE 4 ML: 40 SOLUTION TOPICAL at 12:47

## 2020-06-11 RX ADMIN — ACETAMINOPHEN 1000 MG: 500 TABLET ORAL at 11:07

## 2020-06-11 RX ADMIN — PROPOFOL 200 MG: 10 INJECTION, EMULSION INTRAVENOUS at 12:46

## 2020-06-11 ASSESSMENT — FIBROSIS 4 INDEX: FIB4 SCORE: 1.16

## 2020-06-11 ASSESSMENT — PAIN SCALES - GENERAL: PAIN_LEVEL: 5

## 2020-06-11 NOTE — ANESTHESIA TIME REPORT
Anesthesia Start and Stop Event Times     Date Time Event    6/11/2020 1205 Ready for Procedure     1228 Anesthesia Start     1545 Anesthesia Stop        Responsible Staff  06/11/20    Name Role Begin End    Maury Betts M.D. Anesth 1228 1545        Preop Diagnosis (Free Text):  Pre-op Diagnosis     BREAST CANCER POST CHEMOTHERAPY        Preop Diagnosis (Codes):    Post op Diagnosis  Breast cancer (HCC)      Premium Reason  A. 3PM - 7AM    Comments:

## 2020-06-11 NOTE — OP REPORT
Pre op diagnosis:  Malignant neoplasm left upper outer breast  Post op diagnosis:  Same    Procedure:    1.  DELL localized left partial mastectomy  2.  Left sentinel lymph node mapping with excision of deep axillary nodes    Surgeon:  Yaima Najera MD  Assistant:  Dave Chung MD  Anesthesiologist:  Maury Betts MD    Anesthesia:  General  Pre op meds:  Ancef  ASA 2    Indications:  This is a 49 year old female who originally presented with a clinical stage IB (cT2 cN1) triple positive breast cancer.  She underwent neoadjuvant chemo, completing three cycles of TCH/P and 5 cycles of TCH.  After discussing risks and benefits of her different options, she wishes to proceed with breast conserving surgery with axillary staging.    Findings:  1.  DELL reflectors present in the breast mass and in the axillary node.  2.  Alba node also identified, radioactive to 2400.  Background <10%  3.  Additional left medial margin excised (fibrosis present on initial specimen) at the anterior aspect, suture marks new margin.  4.  Additional left inferior-medial tissue excised by Dr. Chung, suture marks new margin  5.  Vertical mammoplasty and local tissue rearrangement performed by Dr. Chung    Summary:  The patient underwent radiotracer injection by radiology, then had pre op markings placed.  She was anesthetized, prepped, and draped in sterile fashion.  Based on current literature for axillary staging after neoadjuvant treatment, I injected 5cc of isosulfan blue dye with 20cc of saline into the subareolar space, and lymphatic massage was performed.      I made the incision as marked by Dr. Chung in the superior periareolar region, and then along the lateral vertical aspect of the vertical mammoplasty.  I dissected along the anterior mammary fascial plane to the upper outer quadrant as directed by the DELL probe.  I excised widely around this and placed usual orienting sutures.  After discussing with the pathologist, I  excised additional medial tissue.  Anterior margin is skin.  I irrigated and ensured hemostasis, then placed small clips at the posterior aspect and larger clips at the parenchymal borders.      I then dissected through the clavipectoral fascia and used the gamma probe to locate the deep axillary sentinel node.  This was excised using the Harmonic scalpel and was quite deeply located.  Background counts were less than 10%.  The DELL probe was then used to locate the previously biopsied node which was even deeper, and this enlarged node was also excised.  I irrigated and ensured hemostasis.  Initially the pathologist informed us that the node was benign, then as we were finishing the procedure, she came back to tell us that she did find isolated tumor cells.  However, none met criteria for even micrometastasis.  Given her higher risk for complications such as lymphedema, I elected to wait for final pathology and discuss her case with the multidisciplinary team.  Dr. Chung completed his portion of the procedure which is dictated separately.    CC: TASIA Aranda MD Patrick Murphy, MD

## 2020-06-11 NOTE — ANESTHESIA PROCEDURE NOTES
Airway    Date/Time: 6/11/2020 12:47 PM  Performed by: Maury Betts M.D.  Authorized by: Maury Betts M.D.     Location:  OR  Urgency:  Elective  Difficult Airway: No    Indications for Airway Management:  Anesthesia      Spontaneous Ventilation: absent    Sedation Level:  Deep  Preoxygenated: Yes    Patient Position:  Sniffing  Mask Difficulty Assessment:  2 - vent by mask + OA or adjuvant +/- NMBA  Final Airway Type:  Endotracheal airway  Final Endotracheal Airway:  ETT  Cuffed: Yes    Technique Used for Successful ETT Placement:  Direct laryngoscopy    Insertion Site:  Oral  Blade Type:  Edwardo  Laryngoscope Blade/Videolaryngoscope Blade Size:  3  ETT Size (mm):  6.5  Measured from:  Teeth  ETT to Teeth (cm):  21  Placement Verified by: auscultation and capnometry    Cormack-Lehane Classification:  Grade IIa - partial view of glottis  Number of Attempts at Approach:  1   Atraumatic DLx1

## 2020-06-11 NOTE — ANESTHESIA PREPROCEDURE EVALUATION
Left breast cancer    Relevant Problems   Other   (+) Anxiety disorder   (+) Class 3 severe obesity due to excess calories without serious comorbidity with body mass index (BMI) of 40.0 to 44.9 in adult (HCC)   (+) Malig neoplasm of upper-outer quadrant of unsp female breast (HCC)   (+) Mild episode of recurrent major depressive disorder (HCC)   GERD-frequent symptoms    Physical Exam    Airway   Mallampati: II  TM distance: >3 FB  Neck ROM: full       Cardiovascular - normal exam  Rhythm: regular  Rate: normal  (-) murmur     Dental - normal exam           Pulmonary - normal exam  Breath sounds clear to auscultation     Abdominal    Neurological - normal exam                 Anesthesia Plan    ASA 3       Plan - general       Airway plan will be ETT        Induction: intravenous    Postoperative Plan: Postoperative administration of opioids is intended.    Pertinent diagnostic labs and testing reviewed    Informed Consent:    Anesthetic plan and risks discussed with patient.    Use of blood products discussed with: patient whom consented to blood products.

## 2020-06-12 LAB — PATHOLOGY CONSULT NOTE: NORMAL

## 2020-06-12 NOTE — OR NURSING
Pt arrived to Phase 2. Pt complaining of nausea. No complaints of pain. Incisions sites covered with abd pads and ace wrap, clean dry and intact, ALEXANDRO drains emptied with 30ml of sanguinous drainage out in right drain and 40ml of sanguinous drainage out in left drain. Vital signs stable.     @1920 Discharge and drain instructions discussed with patient and patient's mom at bedside. Pt verbalizes understanding. Pt given camisol for support and to hold drains. PIV removed, Pts belongings given to patient. Pt discharged via wheelchair to home with Pts mom.

## 2020-06-12 NOTE — DISCHARGE INSTRUCTIONS
ACTIVITY: Rest and take it easy for the first 24 hours.  A responsible adult is recommended to remain with you during that time.  It is normal to feel sleepy.  We encourage you to not do anything that requires balance, judgment or coordination.    MILD FLU-LIKE SYMPTOMS ARE NORMAL. YOU MAY EXPERIENCE GENERALIZED MUSCLE ACHES, THROAT IRRITATION, HEADACHE AND/OR SOME NAUSEA.    FOR 24 HOURS DO NOT:  Drive, operate machinery or run household appliances.  Drink beer or alcoholic beverages.   Make important decisions or sign legal documents.    SPECIAL INSTRUCTIONS:   Ambulate regularly but avoid overuse of arms, and elevate when resting.  ALEXANDRO education please, Bring record of drainage, sports bra that opens in front/camisole to post op appt    DIET: To avoid nausea, slowly advance diet as tolerated, avoiding spicy or greasy foods for the first day.  Add more substantial food to your diet according to your physician's instructions.  Babies can be fed formula or breast milk as soon as they are hungry.  INCREASE FLUIDS AND FIBER TO AVOID CONSTIPATION.    SURGICAL DRESSING/BATHING: Avoid Showering until cleared by surgeon    FOLLOW-UP APPOINTMENT:  A follow-up appointment should be arranged with your doctor in 1-2 weeks; call to schedule.    You should CALL YOUR PHYSICIAN if you develop:  Fever greater than 101 degrees F.  Pain not relieved by medication, or persistent nausea or vomiting.  Excessive bleeding (blood soaking through dressing) or unexpected drainage from the wound.  Extreme redness or swelling around the incision site, drainage of pus or foul smelling drainage.  Inability to urinate or empty your bladder within 8 hours.  Problems with breathing or chest pain.    You should call 911 if you develop problems with breathing or chest pain.  If you are unable to contact your doctor or surgical center, you should go to the nearest emergency room or urgent care center.  Physician's telephone #: [phone]   N[facility]    If any questions arise, call your doctor.  If your doctor is not available, please feel free to call the Surgical Center at [phone].  The Center is open Monday through Friday from 7AM to 7PM.  You can also call the HEALTH HOTLINE open 24 hours/day, 7 days/week and speak to a nurse at [phone], or toll free at [phone].    A registered nurse may call you a few days after your surgery to see how you are doing after your procedure.    MEDICATIONS: Resume taking daily medication.  Take prescribed pain medication with food.  If no medication is prescribed, you may take non-aspirin pain medication if needed.  PAIN MEDICATION CAN BE VERY CONSTIPATING.  Take a stool softener or laxative such as senokot, pericolace, or milk of magnesia if needed.    Prescription given for Norco and Zofran.  Last pain medication given at 4:24 pm, you may take your next pain medication dose at 8:24 pm.    If your physician has prescribed pain medication that includes Acetaminophen (Tylenol), do not take additional Acetaminophen (Tylenol) while taking the prescribed medication.    Depression / Suicide Risk    As you are discharged from this [facility] facility, it is important to learn how to keep safe from harming yourself.    Recognize the warning signs:  · Abrupt changes in personality, positive or negative- including increase in energy   · Giving away possessions  · Change in eating patterns- significant weight changes-  positive or negative  · Change in sleeping patterns- unable to sleep or sleeping all the time   · Unwillingness or inability to communicate  · Depression  · Unusual sadness, discouragement and loneliness  · Talk of wanting to die  · Neglect of personal appearance   · Rebelliousness- reckless behavior  · Withdrawal from people/activities they love  · Confusion- inability to concentrate     If you or a loved one observes any of these behaviors or has concerns about self-harm, here's what you can  do:  · Talk about it- your feelings and reasons for harming yourself  · Remove any means that you might use to hurt yourself (examples: pills, rope, extension cords, firearm)  · Get professional help from the community (Mental Health, Substance Abuse, psychological counseling)  · Do not be alone:Call your Safe Contact- someone whom you trust who will be there for you.  · Call your local CRISIS HOTLINE 060-1609 or 560-415-5983  · Call your local Children's Mobile Crisis Response Team [place] (339) 093-0207 or www.[employer]  · Call the toll free National Suicide Prevention Hotlines   · National Suicide Prevention Lifeline 510-918-TIFY (7696)  · National Hope Line Network 800-SUICIDE (969-3700)

## 2020-06-12 NOTE — ANESTHESIA POSTPROCEDURE EVALUATION
Patient: Lori Mendes    Procedure Summary     Date:  06/11/20 Room / Location:  [facility] OR 14 / SURGERY [facility]    Anesthesia Start:  1228 Anesthesia Stop:  1545    Procedures:       MASTECTOMY, PARTIAL-DELL LOCALIZED (Left Breast)      LYMPHADENECTOMY, AXILLARY  LYMPH NODE DISSECTION (Left Breast)      BIOPSY, LYMPH NODE, SENTINEL (Left Breast)      EXCISION, LYMPH NODE-DELL LOCALIZED AXILLARY (Left Breast)      RECONSTRUCTION, BREAST- WITH LOCAL TISSUE REARRANGEMENT (Left Breast)      MAMMOPLASTY, REDUCTION- FOR SYMMETRY (Right Breast) Diagnosis:  (BREAST CANCER POST CHEMOTHERAPY)    Surgeon:  Yaima Najera M.D.; Dave Chung M.D. Responsible Provider:  Maury Betts M.D.    Anesthesia Type:  general ASA Status:  3          Final Anesthesia Type: general  Last vitals  BP   Blood Pressure: 125/69    Temp   36.6 °C (97.9 °F)    Pulse   Pulse: 92   Resp   14    SpO2   92 %      Anesthesia Post Evaluation    Patient location during evaluation: PACU  Patient participation: complete - patient participated  Level of consciousness: awake and alert  Pain score: 5    Airway patency: patent  Anesthetic complications: no  Cardiovascular status: hemodynamically stable  Respiratory status: acceptable  Hydration status: euvolemic    PONV: none           Nurse Pain Score: 5 (NPRS)

## 2020-06-25 NOTE — OP REPORT
DATE OF SERVICE:  06/11/2020    PREOPERATIVE DIAGNOSES:  1.  Malignant neoplasm of the left upper outer breast.  2.  Deformity of left breast after partial mastectomy.  3.  Disproportion of reconstructed left breast with patient's right breast.    POSTOPERATIVE DIAGNOSES:  1.  Malignant neoplasm of the left upper outer breast.  2.  Deformity of left breast after partial mastectomy.  3.  Disproportion of reconstructed left breast with patient's right breast.    PROCEDURES PERFORMED:  1.  Left breast reconstruction with local tissue rearrangement via a breast   reduction type incision pattern.  2.  Right breast reduction for symmetry.    SURGEON:  Dave Chung MD    ASSISTANT:  Yaima Najera MD    ANESTHESIOLOGIST:  Maury Betts MD    ANESTHESIA TYPE:  General.    ESTIMATED BLOOD LOSS:  100 mL.    SPECIMENS:  Additional specimens from my portion are:  1.  Right breast tissue.  2.  Left inferior breast tissue remote from tumor site.    COMPLICATIONS:  None.    BRIEF HISTORY:  This is a 49-year-old female who was diagnosed with left   breast cancer last year.  She underwent neoadjuvant chemotherapy, which she   finished at the end of April.  She then saw Dr. Najera for her further surgical   treatment options and elected for a partial mastectomy.  She saw me for her   reconstructive options and we discussed reconstructing the left breast by   moving residual breast tissue around as in a breast reduction type technique.    This would leave her with a significant size and contour discrepancy compared   to her right breast, so we discussed performing a traditional breast   reduction on the right side for symmetry.  Risks, benefits and alternatives of   the procedure were explained to her, risks including bleeding, infection,   anesthesia risks, wound healing issues, potential loss of partial or all   nipple/areola tissue, fat necrosis, contour irregularities, persistent   asymmetries, poor cosmetic outcome,  hematoma or seroma formation, and the   potential need for revision surgery in the future.  We further discussed that   radiation could increase these risks and lead to even further asymmetries.    All of her questions were answered.    PROCEDURE IN DETAIL:  After informed consent was obtained, the patient was   marked in the preoperative holding area.  She was then taken to the operating   room and placed on the table in the supine position.  After induction of   general anesthesia, her chest was prepped and draped in the usual sterile   fashion.  A timeout was called correctly identifying the patient and   procedure.  She received 2 grams of Ancef IV preoperatively.  I began on the   right side while Dr. Najera was performing the partial mastectomy on the left.  I   had marked her for a standard superior medial pedicle vertical skin incision   breast reduction.  The areola was marked with a 42 mm cookie cutter.  The   superior medial pedicle was deepithelialized and the remainder of the skin   incision was made full thickness.  I began by developing the superior medial   pedicle by dissecting down to the chest wall with electrocautery.  At this   point, I removed some residual tissue from the inferior, medial, central and   lateral breast, using the amount of tissue that Dr. Najera had removed as a   guide.  A small amount was also removed from just above the nipple areolar   complex to allow for better inset.  This was all passed off as right breast   tissue.  Hemostasis was then obtained with electrocautery and the area   irrigated with sterile saline.  I rotated the pedicle superiorly until the   nipple areolar complex was in its new position.  I sutured this with 2-0   Vicryl sutures as well as suturing the medial and lateral pillars of inferior   breast tissue together.  An intercostal nerve block with 0.5% Marcaine was   performed and a 7-Italian drain placed through a separate lateral stab   incision.  I then  began the skin closure with 3-0 Monocryl deep dermal sutures   followed by 4-0 Monocryl subcuticular sutures.  Once Dr. Najera was finished on   the left side, I then turned my attention to the reconstruction.  The defect   was located at the upper outer quadrant of the breast.  I decided upon   performing a superior medial pedicle as on the right, but leaving a distal   extension to rotate into position to fill in the defect.  The new areola size   was cut after marking with a 42 mm areolar marker and the pedicle   deepithelialized.  Skin flaps were raised off the residual inferior tissue and   a small triangle of inferior tissue remote from tumor site was excised to   allow for better closure.  I created the pedicle extension by dissecting the   skin flaps off of the underlying parenchyma and raising the deep aspect of the   parenchyma off the chest wall in a stepwise fashion enough to allow for   tension-free rotation and inset into the defect.  This was secured in place   with 2-0 Vicryl sutures.  An intercostal nerve block and drain were placed as   on the right.  The superior aspect of the rotated pedicle was then sutured in   place with Vicryl sutures as well as the medial and lateral pillars of breast   tissue.  The skin was then closed in a similar fashion as on the right.  All   the incisions were dressed with Steri-Strips, ABD pads, and she was placed in   a light compressive elastic wrap.  The patient tolerated the procedure well   and there were no complications.  The sponge and instrument count were correct   at the end of the case.  She was extubated and taken to the recovery room in   good condition.       ____________________________________     LUDWIG DURÁN MD [facility] / JENNY    DD:  06/25/2020 15:05:12  DT:  06/25/2020 15:33:33    D#:  [id number]  Job#:  [id number]

## 2020-06-29 DIAGNOSIS — D49.89 NEOPLASM OF UNSPECIFIED BEHAVIOR OF OTHER SPECIFIED SITES: ICD-10-CM

## 2020-07-02 PROBLEM — C50.412 MALIGNANT NEOPLASM OF UPPER-OUTER QUADRANT OF LEFT BREAST IN FEMALE, ESTROGEN RECEPTOR POSITIVE (HCC): Status: ACTIVE | Noted: 2019-12-19

## 2020-07-02 PROBLEM — Z17.0 MALIGNANT NEOPLASM OF UPPER-OUTER QUADRANT OF LEFT BREAST IN FEMALE, ESTROGEN RECEPTOR POSITIVE (HCC): Status: ACTIVE | Noted: 2019-12-19

## 2020-07-06 ENCOUNTER — OFFICE VISIT (OUTPATIENT)
Dept: URGENT CARE | Facility: CLINIC | Age: 50
End: 2020-07-06
Payer: COMMERCIAL

## 2020-07-06 ENCOUNTER — APPOINTMENT (OUTPATIENT)
Dept: RADIATION ONCOLOGY | Facility: MEDICAL CENTER | Age: 50
End: 2020-07-06
Attending: RADIOLOGY
Payer: COMMERCIAL

## 2020-07-06 VITALS
HEIGHT: 65 IN | TEMPERATURE: 100.6 F | SYSTOLIC BLOOD PRESSURE: 98 MMHG | DIASTOLIC BLOOD PRESSURE: 60 MMHG | HEART RATE: 102 BPM | RESPIRATION RATE: 14 BRPM | BODY MASS INDEX: 39.3 KG/M2 | OXYGEN SATURATION: 97 % | WEIGHT: 235.89 LBS

## 2020-07-06 DIAGNOSIS — I95.9 HYPOTENSION, UNSPECIFIED HYPOTENSION TYPE: ICD-10-CM

## 2020-07-06 DIAGNOSIS — R50.9 FEVER, UNSPECIFIED FEVER CAUSE: ICD-10-CM

## 2020-07-06 PROCEDURE — 99214 OFFICE O/P EST MOD 30 MIN: CPT | Performed by: NURSE PRACTITIONER

## 2020-07-06 RX ORDER — HYDROCODONE BITARTRATE AND ACETAMINOPHEN 5; 325 MG/1; MG/1
TABLET ORAL
COMMUNITY
Start: 2020-06-11 | End: 2020-07-11

## 2020-07-06 RX ORDER — LIDOCAINE AND PRILOCAINE 25; 25 MG/G; MG/G
1 CREAM TOPICAL
COMMUNITY
Start: 2020-07-01 | End: 2021-12-01

## 2020-07-06 RX ORDER — ONDANSETRON 4 MG/1
4 TABLET, FILM COATED ORAL EVERY 8 HOURS PRN
COMMUNITY
End: 2022-05-05

## 2020-07-06 ASSESSMENT — ENCOUNTER SYMPTOMS
FEVER: 1
VOMITING: 0
SORE THROAT: 0
COUGH: 0
ABDOMINAL PAIN: 0
SHORTNESS OF BREATH: 0
NAUSEA: 1

## 2020-07-06 ASSESSMENT — FIBROSIS 4 INDEX: FIB4 SCORE: 1.11

## 2020-07-06 NOTE — PATIENT INSTRUCTIONS
"Fever of Unknown Origin  Fever of \"unknown origin\" is a fever of at least 101° F (38.3° C) or greater, and that has gone on daily for three weeks. It is a fever which has a hidden cause. Fever is a higher-than-normal body temperature. Normal temperature is usually defined as 98.6° F or 37° C. Fever is a symptom, not a disease. A fever may mean that there is something else going on in the body that is causing it.  CAUSES  Fever can be caused by many conditions, including:   · Infections.   · Tissue injuries.   · Medicines.   · Different diseases.   · Being in hot surroundings.   · Tumors or cancers (this is a rare cause).   SYMPTOMS  The signs and symptoms of a fever depend on the cause. At first, a fever can cause a chill. When the brain raises the body's \"thermostat,\" the body responds by shivering to raise the temperature. Shivering produces heat in the body. Once the temperature goes up, the person often feels warm. When the fever goes away, the person may start to sweat.  DIAGNOSIS   There can be many causes of fever. Sometimes, the reason can be very difficult to find. Your caregiver may have to do numerous tests to track down the reason.  TREATMENT   · Medication may be used to control fever.   · Do not use aspirin because of the association with Reye's syndrome.   · If an infection is suspected to be causing the fever and medications have been prescribed, take them as directed. Finish the full course of medications until they are gone.   · Sponging or bathing in lukewarm water can cool the skin and reduce body temperature. Ice water or alcohol sponge baths are not as effective as lukewarm water and should not be used.   HOME CARE  · Continue to eat normally.   · Drink enough fluids to keep urine clear or pale yellow.   · Broths, decaffeinated tea, decaffeinated soft drinks, and oral rehydration solutions (ORS) can help replace fluids and electrolytes.   · Keep all follow-up appointments as directed by your " caregiver.   · Weigh yourself once a day. Write down the weights and bring them to your follow-up appointments to review with your caregiver.   SEEK IMMEDIATE MEDICAL CARE IF:   · You or your child is unable to keep fluids down.   · Vomiting or diarrhea develop or are present and become persistent (continued).   · There is excessive weakness, dizziness, fainting or extreme thirst.   · You have a fever or persistent symptoms for more than 72 hours.   · You have a fever and your symptoms suddenly get worse.   Document Released: 11/03/2005 Document Revised: 03/11/2013 Document Reviewed: 12/18/2006  ExitCare® Patient Information ©2013 [employer].      Hypotension  As your heart beats, it forces blood through your body. Hypotension, commonly called low blood pressure, is when the force of blood pumping through your arteries is too weak. Arteries are blood vessels that carry blood from the heart throughout the body. Depending on the cause and severity, hypotension may be harmless (benign) or may cause serious problems (be critical).  When blood pressure is too low, you may not get enough blood to your brain or to the rest of your organs. This can cause weakness, light-headedness, rapid heartbeat, and fainting.  What are the causes?  This condition may be caused by:  · Blood loss.  · Loss of body fluids (dehydration).  · Heart problems.  · Hormone (endocrine) problems.  · Pregnancy.  · Severe infection.  · Lack of certain nutrients.  · Severe allergic reactions (anaphylaxis).  · Certain medicines, such as blood pressure medicine or medicines that make the body lose excess fluids (diuretics). Sometimes, hypotension may be caused by not taking medicine as directed, such as taking too much of a certain medicine.  What increases the risk?  The following factors may make you more likely to develop this condition:  · Age. Risk increases as you get older.  · Conditions that affect the heart or the central nervous  "system.  · Taking certain medicines, such as blood pressure medicine or diuretics.  · Being pregnant.  What are the signs or symptoms?  Common symptoms of this condition include:  · Weakness.  · Light-headedness.  · Dizziness.  · Blurred vision.  · Fatigue.  · Rapid heartbeat.  · Fainting, in severe cases.  How is this diagnosed?  This condition is diagnosed based on:  · Your medical history.  · Your symptoms.  · Your blood pressure measurement. Your health care provider will check your blood pressure when you are:  ? Lying down.  ? Sitting.  ? Standing.  A blood pressure reading is recorded as two numbers, such as \"120 over 80\" (or 120/80). The first (\"top\") number is called the systolic pressure. It is a measure of the pressure in your arteries as your heart beats. The second (\"bottom\") number is called the diastolic pressure. It is a measure of the pressure in your arteries when your heart relaxes between beats. Blood pressure is measured in a unit called mm Hg. Healthy blood pressure for most adults is 120/80. If your blood pressure is below 90/60, you may be diagnosed with hypotension.  Other information or tests that may be used to diagnose hypotension include:  · Your other vital signs, such as your heart rate and temperature.  · Blood tests.  · Tilt table test. For this test, you will be safely secured to a table that moves you from a lying position to an upright position. Your heart rhythm and blood pressure will be monitored during the test.  How is this treated?  Treatment for this condition may include:  · Changing your diet. This may involve eating more salt (sodium) or drinking more water.  · Taking medicines to raise your blood pressure.  · Changing the dosage of certain medicines you are taking that might be lowering your blood pressure.  · Wearing compression stockings. These stockings help to prevent blood clots and reduce swelling in your legs.  In some cases, you may need to go to the hospital " for:  · Fluid replacement. This means you will receive fluids through an IV.  · Blood replacement. This means you will receive donated blood through an IV (transfusion).  · Treating an infection or heart problems, if this applies.  · Monitoring. You may need to be monitored while medicines that you are taking wear off.  Follow these instructions at home:  Eating and drinking    · Drink enough fluid to keep your urine pale yellow.  · Eat a healthy diet, and follow instructions from your health care provider about eating or drinking restrictions. A healthy diet includes:  ? Fresh fruits and vegetables.  ? Whole grains.  ? Lean meats.  ? Low-fat dairy products.  · Eat extra salt only as directed. Do not add extra salt to your diet unless your health care provider told you to do that.  · Eat frequent, small meals.  · Avoid standing up suddenly after eating.  Medicines  · Take over-the-counter and prescription medicines only as told by your health care provider.  ? Follow instructions from your health care provider about changing the dosage of your current medicines, if this applies.  ? Do not stop or adjust any of your medicines on your own.  General instructions    · Wear compression stockings as told by your health care provider.  · Get up slowly from lying down or sitting positions. This gives your blood pressure a chance to adjust.  · Avoid hot showers and excessive heat as directed by your health care provider.  · Return to your normal activities as told by your health care provider. Ask your health care provider what activities are safe for you.  · Do not use any products that contain nicotine or tobacco, such as cigarettes, e-cigarettes, and chewing tobacco. If you need help quitting, ask your health care provider.  · Keep all follow-up visits as told by your health care provider. This is important.  Contact a health care provider if you:  · Vomit.  · Have diarrhea.  · Have a fever for more than 2-3 days.  · Feel  more thirsty than usual.  · Feel weak and tired.  Get help right away if you:  · Have chest pain.  · Have a fast or irregular heartbeat.  · Develop numbness in any part of your body.  · Cannot move your arms or your legs.  · Have trouble speaking.  · Become sweaty or feel light-headed.  · Faint.  · Feel short of breath.  · Have trouble staying awake.  · Feel confused.  Summary  · Hypotension is when the force of blood pumping through your arteries is too weak.  · Hypotension may be harmless (benign) or may cause serious problems (be critical).  · Treatment for this condition may include changing your diet, changing your medicines, and wearing compression stockings.  · In some cases, you may need to go to the hospital for fluid or blood replacement.  This information is not intended to replace advice given to you by your health care provider. Make sure you discuss any questions you have with your health care provider.  Document Released: 12/18/2006 Document Revised: 06/13/2019 Document Reviewed: 06/13/2019  Elsevier Patient Education © 2020 [employer]

## 2020-07-06 NOTE — PROGRESS NOTES
Subjective:     Lori Mendes is a 50 y.o. female who presents for Fever (Pt states she started developing Fever/Nausea at approx. 0500 this morning. Pt currently undergoing Chemo. )      Woke up with chills this morning. Started a new chemo drug (Kadcyla) on Wednesday, and was told to call if fever presented. States she called and they advised her to go to the ER. She wanted to be evaluated in clinic first, to see that she was ok, as she did not want to go to the ER, that she didn't think they would do anything more. Tool Tylenol at 0700. Temp 101.5 at it's highest. Felt fine yesterday, was tired. Most recent /? States she does have hx of hypotension. No dizziness. No SOB. Nausea consistent with hx of. Had COVID test in June for surgery. Has a post, no complications or signs of infection. No noted issues with surgery.    Fever    This is a new problem. The current episode started today. The problem has been unchanged. Associated symptoms include nausea. Pertinent negatives include no abdominal pain, coughing, ear pain, rash, sore throat or vomiting. She has tried acetaminophen for the symptoms. The treatment provided moderate relief.   Risk factors: no recent sickness and no sick contacts        Past Medical History:   Diagnosis Date   • Anxiety disorder 1/9/2012   • Bowel habit changes     diarrhea    • Cancer (HCC) 12/20/2019    breast    • Heart burn    • Lumbar radiculitis 2/7/2012   • Malig neoplasm of upper-outer quadrant of unsp female breast (HCC) 12/19/2019   • Obesity (BMI 30-39.9) 1/4/2013   • S/P laparoscopic cholecystectomy 10/9/14       Past Surgical History:   Procedure Laterality Date   • PB REDUCTION OF LARGE BREAST Right 6/11/2020    Procedure: MAMMOPLASTY, REDUCTION- FOR SYMMETRY;  Surgeon: Dave Chung M.D.;  Location: SURGERY [facility];  Service: Plastics   • PARTIAL MASTECTOMY Left 6/11/2020    Procedure: MASTECTOMY, PARTIAL-DELL LOCALIZED;  Surgeon: Yaima Najera,  M.D.;  Location: SURGERY [facility];  Service: General   • NODE BIOPSY SENTINEL Left 6/11/2020    Procedure: BIOPSY, LYMPH NODE, SENTINEL;  Surgeon: Yaima Najera M.D.;  Location: SURGERY [facility];  Service: General   • LYMPH NODE EXCISION Left 6/11/2020    Procedure: EXCISION, LYMPH NODE-DELL LOCALIZED AXILLARY;  Surgeon: Yaima Najera M.D.;  Location: SURGERY [facility];  Service: General   • BREAST RECONSTRUCTION Left 6/11/2020    Procedure: RECONSTRUCTION, BREAST- WITH LOCAL TISSUE REARRANGEMENT;  Surgeon: Dave Chung M.D.;  Location: SURGERY [facility];  Service: Plastics   • AXILLARY NODE DISSECTION Left 6/11/2020    Procedure: LYMPHADENECTOMY, AXILLARY  LYMPH NODE DISSECTION;  Surgeon: Yaima Najera M.D.;  Location: SURGERY [facility];  Service: General   • CATH PLACEMENT Right 1/14/2020    Procedure: INSERTION, CATHETER - PORT;  Surgeon: Yaima Najera M.D.;  Location: SURGERY [facility];  Service: General   • TRICIA BY LAPAROSCOPY  10/9/2014    Performed by Mal Brantley D.O. at SURGERY [facility]   • BREAST BIOPSY      benign left bx in 1993       Social History     Socioeconomic History   • Marital status:      Spouse name: Not on file   • Number of children: Not on file   • Years of education: Not on file   • Highest education level: Not on file   Occupational History   • Occupation: self employed.      Employer: OTHER   Social Needs   • Financial resource strain: Not on file   • Food insecurity     Worry: Not on file     Inability: Not on file   • Transportation needs     Medical: Not on file     Non-medical: Not on file   Tobacco Use   • Smoking status: Never Smoker   • Smokeless tobacco: Former User     Types: Chew   • Tobacco comment: unable to quantify chewing   Substance and Sexual Activity   • Alcohol use: No     Alcohol/week: 0.0 oz   • Drug use: Yes     Types: Inhaled, Oral     Comment: marijuana daily - last used 6/10/20   •  Sexual activity: Never     Partners: Male   Lifestyle   • Physical activity     Days per week: Not on file     Minutes per session: Not on file   • Stress: Not on file   Relationships   • Social connections     Talks on phone: Not on file     Gets together: Not on file     Attends [employer] service: Not on file     Active member of club or organization: Not on file     Attends meetings of clubs or organizations: Not on file     Relationship status: Not on file   • Intimate partner violence     Fear of current or ex partner: Not on file     Emotionally abused: Not on file     Physically abused: Not on file     Forced sexual activity: Not on file   Other Topics Concern   •  Service Not Asked   • Blood Transfusions Not Asked   • Caffeine Concern Not Asked   • Occupational Exposure Not Asked   • Hobby Hazards Not Asked   • Sleep Concern Not Asked   • Stress Concern Not Asked   • Weight Concern Not Asked   • Special Diet Not Asked   • Back Care Not Asked   • Exercise Yes     Comment: daily workouts at the gym   • Bike Helmet No   • Seat Belt Yes   • Self-Exams Yes   Social History Narrative    Single, has son.         Family History   Problem Relation Age of Onset   • Hypertension Father    • Cancer Maternal Grandmother 50        breast cancer, cervical cancer and lung cancer   • Breast Cancer Maternal Grandmother    • Cancer Paternal Grandmother 60        breast cancer   • Breast Cancer Paternal Grandmother    • Diabetes Paternal Grandmother    • Osteoporosis Paternal Grandmother         No Known Allergies    Review of Systems   Constitutional: Positive for fever and malaise/fatigue.   HENT: Negative for ear pain and sore throat.    Respiratory: Negative for cough and shortness of breath.    Gastrointestinal: Positive for nausea. Negative for abdominal pain and vomiting.   Skin: Negative for rash.   All other systems reviewed and are negative.       Objective:   BP (!) 98/60   Pulse (!) 102   Temp (!) 38.1 °C  "(100.6 °F) (Temporal)   Resp 14   Ht 1.651 m (5' 5\")   Wt 107 kg (235 lb 14.3 oz)   SpO2 97%   BMI 39.25 kg/m²     Physical Exam  Vitals signs reviewed.   Constitutional:       General: She is not in acute distress.     Appearance: She is well-developed. She is ill-appearing. She is not toxic-appearing.   HENT:      Head: Normocephalic and atraumatic.      Right Ear: External ear normal.      Left Ear: External ear normal.      Nose: Nose normal.      Mouth/Throat:      Mouth: Mucous membranes are moist.   Eyes:      Conjunctiva/sclera: Conjunctivae normal.   Neck:      Musculoskeletal: Normal range of motion and neck supple.   Cardiovascular:      Rate and Rhythm: Tachycardia present.   Pulmonary:      Effort: Pulmonary effort is normal. No respiratory distress.      Breath sounds: Normal breath sounds. No stridor. No wheezing, rhonchi or rales.   Musculoskeletal: Normal range of motion.   Lymphadenopathy:      Head:      Right side of head: No submental, submandibular, tonsillar, preauricular, posterior auricular or occipital adenopathy.      Left side of head: No submental, submandibular, tonsillar, preauricular, posterior auricular or occipital adenopathy.   Skin:     General: Skin is warm and dry.      Findings: No rash.   Neurological:      General: No focal deficit present.      Mental Status: She is alert and oriented to person, place, and time.      GCS: GCS eye subscore is 4. GCS verbal subscore is 5. GCS motor subscore is 6.   Psychiatric:         Mood and Affect: Mood normal.         Speech: Speech normal.         Behavior: Behavior normal.         Thought Content: Thought content normal.         Judgment: Judgment normal.         Assessment/Plan:   1. Fever, unspecified fever cause    2. Hypotension, unspecified hypotension type    Other orders  - ondansetron (ZOFRAN) 4 MG Tab tablet; TAKE 1 TABLET ORALLY EVERY 8 HRS AS NEEDED FOR NAUSEA / VOMITING  - HYDROcodone-acetaminophen (NORCO) 5-325 MG Tab " per tablet; TAKE 1 TABLET ORALLY EVERY 4 HRS AS NEEDED FOR PAIN G89.18 4 DAY SUPPLY  - lidocaine-prilocaine (EMLA) 2.5-2.5 % Cream  Discussed concerns with history, fever, and hypotension. Possible underlying infection. Agree with recommendation to follow up emergently for symptoms. Pt referred to the ER for further evaluation.     Differential diagnosis, natural history, supportive care, and indications for immediate follow-up discussed.

## 2020-07-08 ENCOUNTER — HOSPITAL ENCOUNTER (OUTPATIENT)
Facility: MEDICAL CENTER | Age: 50
End: 2020-07-08
Attending: NURSE PRACTITIONER
Payer: COMMERCIAL

## 2020-07-08 LAB
ALBUMIN SERPL BCP-MCNC: 4 G/DL (ref 3.2–4.9)
ALBUMIN/GLOB SERPL: 1.6 G/DL
ALP SERPL-CCNC: 141 U/L (ref 30–99)
ALT SERPL-CCNC: 41 U/L (ref 2–50)
ANION GAP SERPL CALC-SCNC: 17 MMOL/L (ref 7–16)
AST SERPL-CCNC: 35 U/L (ref 12–45)
BILIRUB SERPL-MCNC: 0.7 MG/DL (ref 0.1–1.5)
BUN SERPL-MCNC: 20 MG/DL (ref 8–22)
CALCIUM SERPL-MCNC: 9.4 MG/DL (ref 8.5–10.5)
CHLORIDE SERPL-SCNC: 97 MMOL/L (ref 96–112)
CO2 SERPL-SCNC: 22 MMOL/L (ref 20–33)
CREAT SERPL-MCNC: 1.16 MG/DL (ref 0.5–1.4)
GLOBULIN SER CALC-MCNC: 2.5 G/DL (ref 1.9–3.5)
GLUCOSE SERPL-MCNC: 92 MG/DL (ref 65–99)
POTASSIUM SERPL-SCNC: 3.5 MMOL/L (ref 3.6–5.5)
PROT SERPL-MCNC: 6.5 G/DL (ref 6–8.2)
SODIUM SERPL-SCNC: 136 MMOL/L (ref 135–145)

## 2020-07-08 PROCEDURE — 80053 COMPREHEN METABOLIC PANEL: CPT

## 2020-07-09 DIAGNOSIS — N61.1 BREAST ABSCESS: ICD-10-CM

## 2020-07-11 ENCOUNTER — APPOINTMENT (OUTPATIENT)
Dept: RADIOLOGY | Facility: MEDICAL CENTER | Age: 50
DRG: 600 | End: 2020-07-11
Attending: EMERGENCY MEDICINE
Payer: COMMERCIAL

## 2020-07-11 ENCOUNTER — HOSPITAL ENCOUNTER (INPATIENT)
Facility: MEDICAL CENTER | Age: 50
LOS: 2 days | DRG: 600 | End: 2020-07-13
Attending: EMERGENCY MEDICINE | Admitting: HOSPITALIST
Payer: COMMERCIAL

## 2020-07-11 DIAGNOSIS — C50.912 MALIGNANT NEOPLASM OF LEFT FEMALE BREAST, UNSPECIFIED ESTROGEN RECEPTOR STATUS, UNSPECIFIED SITE OF BREAST (HCC): ICD-10-CM

## 2020-07-11 DIAGNOSIS — L03.90 CELLULITIS, UNSPECIFIED CELLULITIS SITE: ICD-10-CM

## 2020-07-11 DIAGNOSIS — N61.0 MASTITIS: ICD-10-CM

## 2020-07-11 DIAGNOSIS — T45.1X5A IMMUNOSUPPRESSED DUE TO CHEMOTHERAPY (HCC): ICD-10-CM

## 2020-07-11 DIAGNOSIS — L08.9 WOUND INFECTION: ICD-10-CM

## 2020-07-11 DIAGNOSIS — T14.8XXA WOUND INFECTION: ICD-10-CM

## 2020-07-11 DIAGNOSIS — D84.821 IMMUNOSUPPRESSED DUE TO CHEMOTHERAPY (HCC): ICD-10-CM

## 2020-07-11 DIAGNOSIS — Z79.899 IMMUNOSUPPRESSED DUE TO CHEMOTHERAPY (HCC): ICD-10-CM

## 2020-07-11 PROBLEM — E87.1 HYPONATREMIA: Status: ACTIVE | Noted: 2020-07-11

## 2020-07-11 PROBLEM — E66.812 CLASS 2 OBESITY IN ADULT: Status: ACTIVE | Noted: 2020-07-11

## 2020-07-11 PROBLEM — E66.9 CLASS 2 OBESITY IN ADULT: Status: ACTIVE | Noted: 2020-07-11

## 2020-07-11 PROBLEM — E87.6 HYPOKALEMIA: Status: ACTIVE | Noted: 2020-07-11

## 2020-07-11 LAB
ALBUMIN SERPL BCP-MCNC: 3.6 G/DL (ref 3.2–4.9)
ALBUMIN/GLOB SERPL: 1.1 G/DL
ALP SERPL-CCNC: 104 U/L (ref 30–99)
ALT SERPL-CCNC: 18 U/L (ref 2–50)
ANION GAP SERPL CALC-SCNC: 13 MMOL/L (ref 7–16)
AST SERPL-CCNC: 15 U/L (ref 12–45)
BASOPHILS # BLD AUTO: 0.5 % (ref 0–1.8)
BASOPHILS # BLD: 0.05 K/UL (ref 0–0.12)
BILIRUB SERPL-MCNC: 0.6 MG/DL (ref 0.1–1.5)
BUN SERPL-MCNC: 10 MG/DL (ref 8–22)
CALCIUM SERPL-MCNC: 9.6 MG/DL (ref 8.5–10.5)
CHLORIDE SERPL-SCNC: 94 MMOL/L (ref 96–112)
CO2 SERPL-SCNC: 24 MMOL/L (ref 20–33)
COVID ORDER STATUS COVID19: NORMAL
CREAT SERPL-MCNC: 0.81 MG/DL (ref 0.5–1.4)
EOSINOPHIL # BLD AUTO: 0.24 K/UL (ref 0–0.51)
EOSINOPHIL NFR BLD: 2.3 % (ref 0–6.9)
ERYTHROCYTE [DISTWIDTH] IN BLOOD BY AUTOMATED COUNT: 46.9 FL (ref 35.9–50)
GLOBULIN SER CALC-MCNC: 3.4 G/DL (ref 1.9–3.5)
GLUCOSE SERPL-MCNC: 118 MG/DL (ref 65–99)
GRAM STN SPEC: NORMAL
HCT VFR BLD AUTO: 30.9 % (ref 37–47)
HGB BLD-MCNC: 10.1 G/DL (ref 12–16)
IMM GRANULOCYTES # BLD AUTO: 0.11 K/UL (ref 0–0.11)
IMM GRANULOCYTES NFR BLD AUTO: 1 % (ref 0–0.9)
LACTATE BLD-SCNC: 1.4 MMOL/L (ref 0.5–2)
LYMPHOCYTES # BLD AUTO: 1.84 K/UL (ref 1–4.8)
LYMPHOCYTES NFR BLD: 17.3 % (ref 22–41)
MCH RBC QN AUTO: 30.7 PG (ref 27–33)
MCHC RBC AUTO-ENTMCNC: 32.7 G/DL (ref 33.6–35)
MCV RBC AUTO: 93.9 FL (ref 81.4–97.8)
MONOCYTES # BLD AUTO: 1.38 K/UL (ref 0–0.85)
MONOCYTES NFR BLD AUTO: 13 % (ref 0–13.4)
NEUTROPHILS # BLD AUTO: 7.02 K/UL (ref 2–7.15)
NEUTROPHILS NFR BLD: 65.9 % (ref 44–72)
NRBC # BLD AUTO: 0 K/UL
NRBC BLD-RTO: 0 /100 WBC
PLATELET # BLD AUTO: 130 K/UL (ref 164–446)
PMV BLD AUTO: 11.3 FL (ref 9–12.9)
POTASSIUM SERPL-SCNC: 3.1 MMOL/L (ref 3.6–5.5)
PROT SERPL-MCNC: 7 G/DL (ref 6–8.2)
RBC # BLD AUTO: 3.29 M/UL (ref 4.2–5.4)
SARS-COV-2 RNA RESP QL NAA+PROBE: NOTDETECTED
SIGNIFICANT IND 70042: NORMAL
SITE SITE: NORMAL
SODIUM SERPL-SCNC: 131 MMOL/L (ref 135–145)
SOURCE SOURCE: NORMAL
SPECIMEN SOURCE: NORMAL
WBC # BLD AUTO: 10.6 K/UL (ref 4.8–10.8)

## 2020-07-11 PROCEDURE — 83605 ASSAY OF LACTIC ACID: CPT

## 2020-07-11 PROCEDURE — 700102 HCHG RX REV CODE 250 W/ 637 OVERRIDE(OP): Performed by: HOSPITALIST

## 2020-07-11 PROCEDURE — 99223 1ST HOSP IP/OBS HIGH 75: CPT | Performed by: HOSPITALIST

## 2020-07-11 PROCEDURE — U0004 COV-19 TEST NON-CDC HGH THRU: HCPCS

## 2020-07-11 PROCEDURE — 71045 X-RAY EXAM CHEST 1 VIEW: CPT

## 2020-07-11 PROCEDURE — 87186 SC STD MICRODIL/AGAR DIL: CPT

## 2020-07-11 PROCEDURE — 99285 EMERGENCY DEPT VISIT HI MDM: CPT

## 2020-07-11 PROCEDURE — 87077 CULTURE AEROBIC IDENTIFY: CPT

## 2020-07-11 PROCEDURE — 700105 HCHG RX REV CODE 258: Performed by: HOSPITALIST

## 2020-07-11 PROCEDURE — 87070 CULTURE OTHR SPECIMN AEROBIC: CPT

## 2020-07-11 PROCEDURE — 85025 COMPLETE CBC W/AUTO DIFF WBC: CPT

## 2020-07-11 PROCEDURE — 304217 HCHG IRRIGATION SYSTEM

## 2020-07-11 PROCEDURE — 87040 BLOOD CULTURE FOR BACTERIA: CPT | Mod: 91

## 2020-07-11 PROCEDURE — 80053 COMPREHEN METABOLIC PANEL: CPT

## 2020-07-11 PROCEDURE — 700111 HCHG RX REV CODE 636 W/ 250 OVERRIDE (IP): Performed by: EMERGENCY MEDICINE

## 2020-07-11 PROCEDURE — 87147 CULTURE TYPE IMMUNOLOGIC: CPT

## 2020-07-11 PROCEDURE — 36415 COLL VENOUS BLD VENIPUNCTURE: CPT

## 2020-07-11 PROCEDURE — 770001 HCHG ROOM/CARE - MED/SURG/GYN PRIV*

## 2020-07-11 PROCEDURE — C9803 HOPD COVID-19 SPEC COLLECT: HCPCS | Performed by: INTERNAL MEDICINE

## 2020-07-11 PROCEDURE — A9270 NON-COVERED ITEM OR SERVICE: HCPCS | Performed by: HOSPITALIST

## 2020-07-11 PROCEDURE — 96366 THER/PROPH/DIAG IV INF ADDON: CPT

## 2020-07-11 PROCEDURE — 700111 HCHG RX REV CODE 636 W/ 250 OVERRIDE (IP): Performed by: HOSPITALIST

## 2020-07-11 PROCEDURE — 96365 THER/PROPH/DIAG IV INF INIT: CPT

## 2020-07-11 PROCEDURE — 87205 SMEAR GRAM STAIN: CPT

## 2020-07-11 PROCEDURE — 700105 HCHG RX REV CODE 258: Performed by: EMERGENCY MEDICINE

## 2020-07-11 RX ORDER — SODIUM CHLORIDE, SODIUM LACTATE, POTASSIUM CHLORIDE, AND CALCIUM CHLORIDE .6; .31; .03; .02 G/100ML; G/100ML; G/100ML; G/100ML
30 INJECTION, SOLUTION INTRAVENOUS
Status: DISCONTINUED | OUTPATIENT
Start: 2020-07-11 | End: 2020-07-13 | Stop reason: HOSPADM

## 2020-07-11 RX ORDER — LOPERAMIDE HYDROCHLORIDE 2 MG/1
2 CAPSULE ORAL 4 TIMES DAILY PRN
COMMUNITY
End: 2021-12-01

## 2020-07-11 RX ORDER — CITALOPRAM 20 MG/1
30 TABLET ORAL DAILY
Status: DISCONTINUED | OUTPATIENT
Start: 2020-07-11 | End: 2020-07-13 | Stop reason: HOSPADM

## 2020-07-11 RX ORDER — BISACODYL 10 MG
10 SUPPOSITORY, RECTAL RECTAL
Status: DISCONTINUED | OUTPATIENT
Start: 2020-07-11 | End: 2020-07-13 | Stop reason: HOSPADM

## 2020-07-11 RX ORDER — ONDANSETRON 2 MG/ML
4 INJECTION INTRAMUSCULAR; INTRAVENOUS EVERY 4 HOURS PRN
Status: DISCONTINUED | OUTPATIENT
Start: 2020-07-11 | End: 2020-07-13 | Stop reason: HOSPADM

## 2020-07-11 RX ORDER — POLYETHYLENE GLYCOL 3350 17 G/17G
1 POWDER, FOR SOLUTION ORAL
Status: DISCONTINUED | OUTPATIENT
Start: 2020-07-11 | End: 2020-07-13 | Stop reason: HOSPADM

## 2020-07-11 RX ORDER — ACETAMINOPHEN 325 MG/1
650 TABLET ORAL EVERY 6 HOURS PRN
Status: DISCONTINUED | OUTPATIENT
Start: 2020-07-11 | End: 2020-07-13 | Stop reason: HOSPADM

## 2020-07-11 RX ORDER — GABAPENTIN 300 MG/1
400 CAPSULE ORAL 2 TIMES DAILY
COMMUNITY
End: 2021-01-29 | Stop reason: SDUPTHER

## 2020-07-11 RX ORDER — PROCHLORPERAZINE MALEATE 10 MG
10 TABLET ORAL EVERY 6 HOURS PRN
COMMUNITY
End: 2021-08-30

## 2020-07-11 RX ORDER — CITALOPRAM 20 MG/1
30 TABLET ORAL DAILY
COMMUNITY
End: 2020-09-08

## 2020-07-11 RX ORDER — LOPERAMIDE HYDROCHLORIDE 2 MG/1
2 CAPSULE ORAL 4 TIMES DAILY PRN
Status: DISCONTINUED | OUTPATIENT
Start: 2020-07-11 | End: 2020-07-13 | Stop reason: HOSPADM

## 2020-07-11 RX ORDER — AMOXICILLIN 250 MG
2 CAPSULE ORAL 2 TIMES DAILY
Status: DISCONTINUED | OUTPATIENT
Start: 2020-07-11 | End: 2020-07-13 | Stop reason: HOSPADM

## 2020-07-11 RX ORDER — GABAPENTIN 300 MG/1
600 CAPSULE ORAL 2 TIMES DAILY
Status: DISCONTINUED | OUTPATIENT
Start: 2020-07-11 | End: 2020-07-13 | Stop reason: HOSPADM

## 2020-07-11 RX ADMIN — POTASSIUM CHLORIDE: 149 INJECTION, SOLUTION, CONCENTRATE INTRAVENOUS at 16:36

## 2020-07-11 RX ADMIN — VANCOMYCIN HYDROCHLORIDE 1500 MG: 500 INJECTION, POWDER, LYOPHILIZED, FOR SOLUTION INTRAVENOUS at 20:43

## 2020-07-11 RX ADMIN — VANCOMYCIN HYDROCHLORIDE 2750 MG: 500 INJECTION, POWDER, LYOPHILIZED, FOR SOLUTION INTRAVENOUS at 09:49

## 2020-07-11 RX ADMIN — GABAPENTIN 600 MG: 300 CAPSULE ORAL at 13:24

## 2020-07-11 RX ADMIN — SODIUM CHLORIDE 3 G: 900 INJECTION INTRAVENOUS at 13:45

## 2020-07-11 RX ADMIN — SODIUM CHLORIDE 3 G: 900 INJECTION INTRAVENOUS at 18:28

## 2020-07-11 RX ADMIN — CITALOPRAM HYDROBROMIDE 30 MG: 20 TABLET ORAL at 13:24

## 2020-07-11 RX ADMIN — ONDANSETRON 4 MG: 2 INJECTION INTRAMUSCULAR; INTRAVENOUS at 20:40

## 2020-07-11 ASSESSMENT — LIFESTYLE VARIABLES
HOW MANY TIMES IN THE PAST YEAR HAVE YOU HAD 5 OR MORE DRINKS IN A DAY: 0
HAVE PEOPLE ANNOYED YOU BY CRITICIZING YOUR DRINKING: NO
TOTAL SCORE: 0
ON A TYPICAL DAY WHEN YOU DRINK ALCOHOL HOW MANY DRINKS DO YOU HAVE: 0
CONSUMPTION TOTAL: NEGATIVE
HAVE YOU EVER FELT YOU SHOULD CUT DOWN ON YOUR DRINKING: NO
EVER HAD A DRINK FIRST THING IN THE MORNING TO STEADY YOUR NERVES TO GET RID OF A HANGOVER: NO
ALCOHOL_USE: NO
EVER_SMOKED: NEVER
DO YOU DRINK ALCOHOL: NO
TOTAL SCORE: 0
EVER_SMOKED: NEVER
TOTAL SCORE: 0
DOES PATIENT WANT TO STOP DRINKING: CANNOT ASSESS
EVER FELT BAD OR GUILTY ABOUT YOUR DRINKING: NO
DOES PATIENT WANT TO STOP DRINKING: NO
AVERAGE NUMBER OF DAYS PER WEEK YOU HAVE A DRINK CONTAINING ALCOHOL: 0

## 2020-07-11 ASSESSMENT — ENCOUNTER SYMPTOMS
FEVER: 1
SHORTNESS OF BREATH: 0
CHILLS: 1
COUGH: 0

## 2020-07-11 ASSESSMENT — PATIENT HEALTH QUESTIONNAIRE - PHQ9
1. LITTLE INTEREST OR PLEASURE IN DOING THINGS: NOT AT ALL
SUM OF ALL RESPONSES TO PHQ9 QUESTIONS 1 AND 2: 0
2. FEELING DOWN, DEPRESSED, IRRITABLE, OR HOPELESS: NOT AT ALL

## 2020-07-11 ASSESSMENT — COGNITIVE AND FUNCTIONAL STATUS - GENERAL
DAILY ACTIVITIY SCORE: 24
SUGGESTED CMS G CODE MODIFIER DAILY ACTIVITY: CH
SUGGESTED CMS G CODE MODIFIER MOBILITY: CH
MOBILITY SCORE: 24

## 2020-07-11 ASSESSMENT — FIBROSIS 4 INDEX: FIB4 SCORE: 1.39

## 2020-07-11 NOTE — ED TRIAGE NOTES
Chief Complaint   Patient presents with   • Sent by MD Dalton      Pt ambulated to triage , sent by  for left breast wound. She had lumpectomy 4wks ago. Pt have breast cancer, last chemo 2wks ago.  Left breast with open wound around nipple line , redness around with drainage.   Sepsis and neutropenic protocol initiated.  Pt to red 12

## 2020-07-11 NOTE — PROGRESS NOTES
Patient admitted to room 612-1 from ED. Here with mastitis, I/D done in emergency room. Wound care order to pack and cover with guaze daily. Changed dressing x1 on floor due to drainage. Patient alert and oriented x4, independent in room. Here for IV antibiotics. Oriented to room and unit, call bell within reach.

## 2020-07-11 NOTE — H&P
"Hospital Medicine History & Physical Note    Date of Service  7/11/2020    Primary Care Physician  Renetta Lozoya P.A.-C.    Code Status  Full Code    Chief Complaint  Chief Complaint   Patient presents with   • Sent by MD Dalton        History of Presenting Illness  50 y.o. female who presented 7/11/2020 with left breast infection.  Ms. Mendes has a past medical history of a recently diagnosed triple positive breast cancer for which she underwent neoadjuvant therapy by Dr. Aldridge by partial mastectomy by Dr. Najera on 6/11 had instruction surgery afterwards by Dr. Chung.  Recently started on Kadcyla for chemotherapy last dose was 7/1. She developed redness of the breast and a fever up to 101 and was seen in the urgent care on 7/6.  Subsequently she had a dose of IV antibiotics at [facility] due to infection then saw  Dr. Chung who started her on amoxicillin.  She presents today with worsening symptoms and increased redness in the left breast.  Ms. Mendes notes ongoing fevers and chills. This morning she had a \"green discharge\" out of the superior aspect of the breast.  Is room she is found to have significant left breast cellulitis that has failed outpatient antibiotics will be admitted for IV vancomycin and Unasyn.  In the emergency room Dr. Chung has probed the site and has obtain cultures.  Denies contact with persons known to have COVID, denies cough or shortness of breath, denies changes in her taste or smell.  Review of Systems  Review of Systems   Constitutional: Positive for chills and fever.   Respiratory: Negative for cough and shortness of breath.    Cardiovascular: Negative for chest pain.   All other systems reviewed and are negative.      Past Medical History   has a past medical history of Anxiety disorder (1/9/2012), Bowel habit changes, Cancer (HCC) (12/20/2019), Heart burn, Lumbar radiculitis (2/7/2012), Malig neoplasm of upper-outer quadrant of unsp female breast (HCC) " (12/19/2019), Obesity (BMI 30-39.9) (1/4/2013), and S/P laparoscopic cholecystectomy (10/9/14).    Surgical History   has a past surgical history that includes breast biopsy; madhu by laparoscopy (10/9/2014); cath placement (Right, 1/14/2020); pr reduction of large breast (Right, 6/11/2020); partial mastectomy (Left, 6/11/2020); node biopsy sentinel (Left, 6/11/2020); lymph node excision (Left, 6/11/2020); breast reconstruction (Left, 6/11/2020); and axillary node dissection (Left, 6/11/2020).     Family History  family history includes Breast Cancer in her maternal grandmother and paternal grandmother; Cancer (age of onset: 50) in her maternal grandmother; Cancer (age of onset: 60) in her paternal grandmother; Diabetes in her paternal grandmother; Hypertension in her father; Osteoporosis in her paternal grandmother.     Social History   reports that she has never smoked. She quit smokeless tobacco use about 20 years ago.  Her smokeless tobacco use included chew. She reports current drug use. Drugs: Inhaled and Oral. She reports that she does not drink alcohol.    Allergies  No Known Allergies    Medications  Prior to Admission Medications   Prescriptions Last Dose Informant Patient Reported? Taking?   AMOXICILLIN PO 7/10/2020 at PM  Yes Yes   Sig: Take 1 Dose by mouth 2 Times a Day. Unknown RX Strength.  Unknown criteria and course length.   CALCIUM PO 7/10/2020 at AM Patient Yes No   Sig: Take 1 Dose by mouth every morning. Unknown OTC Strength.   Magnesium 500 MG Cap 7/10/2020 at AM Patient Yes No   Sig: Take 500 mg by mouth every morning.   POTASSIUM PO 7/10/2020 at AM Patient Yes No   Sig: Take 1 Dose by mouth every morning. Unknown OTC Strength.   VITAMIN D PO 7/10/2020 at AM Patient Yes No   Sig: Take 1 Dose by mouth every morning. Unknown OTC Strength.   citalopram (CELEXA) 20 MG Tab 7/10/2020 at AM  Yes Yes   Sig: Take 30 mg by mouth every day.   diphenoxylate-atropine (LOMOTIL) 2.5-0.025 MG Tab 7/10/2020  at PRN Patient Yes No   Sig: Take 1 Tab by mouth 3 times a day as needed for Diarrhea.   gabapentin (NEURONTIN) 300 MG Cap 7/10/2020 at PM  Yes Yes   Sig: Take 600 mg by mouth 2 Times a Day.   lidocaine-prilocaine (EMLA) 2.5-2.5 % Cream 7/11/2020 at PRN  Yes No   Sig: Apply 1 g to affected area(s) 1 time daily as needed (Port Access).   loperamide (IMODIUM) 2 MG Cap 7/10/2020 at PRN  Yes Yes   Sig: Take 2 mg by mouth 4 times a day as needed for Diarrhea.   ondansetron (ZOFRAN) 4 MG Tab tablet 7/10/2020 at PRN  Yes No   Sig: Take 4 mg by mouth every 8 hours as needed for Nausea/Vomiting.   prochlorperazine (COMPAZINE) 10 MG Tab >week at PRN  Yes Yes   Sig: Take 10 mg by mouth every 6 hours as needed for Nausea/Vomiting.      Facility-Administered Medications: None       Physical Exam  Temp:  [37.2 °C (98.9 °F)] 37.2 °C (98.9 °F)  Pulse:  [] 58  Resp:  [7-42] 42  BP: (105-130)/(60-76) 105/68  SpO2:  [91 %-95 %] 93 %    Physical Exam  Vitals signs and nursing note reviewed.   Constitutional:       Appearance: Normal appearance. She is not toxic-appearing.   HENT:      Head: Normocephalic and atraumatic.      Mouth/Throat:      Mouth: Mucous membranes are dry.      Pharynx: Oropharynx is clear.   Eyes:      General: No scleral icterus.     Conjunctiva/sclera: Conjunctivae normal.   Neck:      Musculoskeletal: Normal range of motion and neck supple.   Cardiovascular:      Comments: Right chest port  Sinus rhythm without a murmur  Pulmonary:      Effort: Pulmonary effort is normal. No respiratory distress.      Breath sounds: No wheezing.   Abdominal:      General: There is no distension.      Tenderness: There is no abdominal tenderness.   Genitourinary:     Comments: Left breast is red  Approximately 1.5 cm horizontal post-surgical site without discharge  Musculoskeletal:      Right lower leg: No edema.      Left lower leg: No edema.   Skin:     General: Skin is warm and dry.      Coloration: Skin is pale.       Comments: Bruise right shin   Neurological:      General: No focal deficit present.      Mental Status: She is alert and oriented to person, place, and time.   Psychiatric:         Mood and Affect: Mood normal.         Behavior: Behavior normal.         Laboratory:  Recent Labs     07/11/20  0918   WBC 10.6   RBC 3.29*   HEMOGLOBIN 10.1*   HEMATOCRIT 30.9*   MCV 93.9   MCH 30.7   MCHC 32.7*   RDW 46.9   PLATELETCT 130*   MPV 11.3     Recent Labs     07/08/20  1340 07/11/20  0918   SODIUM 136 131*   POTASSIUM 3.5* 3.1*   CHLORIDE 97 94*   CO2 22 24   GLUCOSE 92 118*   BUN 20 10   CREATININE 1.16 0.81   CALCIUM 9.4 9.6     Recent Labs     07/08/20  1340 07/11/20  0918   ALTSGPT 41 18   ASTSGOT 35 15   ALKPHOSPHAT 141* 104*   TBILIRUBIN 0.7 0.6   GLUCOSE 92 118*         No results for input(s): NTPROBNP in the last 72 hours.      No results for input(s): TROPONINT in the last 72 hours.    Imaging:  DX-CHEST-PORTABLE (1 VIEW)   Final Result      No acute cardiopulmonary abnormality identified.            Assessment/Plan:      * Cellulitis of left breast- (present on admission)  Assessment & Plan  Post-operatively  She had been on amoxicillin outpatient  She will be admitted for IV unasyn and vancomycin  Culture was drawn in the ER by Dr. Chung    Malignant neoplasm of left breast in female, estrogen receptor positive (HCC)- (present on admission)  Assessment & Plan  Status post neoadjuvant chemo followed by left partial mastectomy by Dr. Najera 6/11 with reconstruction by Dr. Chung  She is receiving Kadcyla every 3 weeks by Dr. Aldridge and is due 7/22  WBC is 10.6  Follow CBC in the morning    Class 2 obesity in adult- (present on admission)  Assessment & Plan  BMI 39    Hypokalemia- (present on admission)  Assessment & Plan  K low at 3.1 thus potassium will be added to IV fluids  Check labs in the morning    Hyponatremia- (present on admission)  Assessment & Plan  Na low at 131  Hypovolemic due to fever  IV fluids

## 2020-07-11 NOTE — ED PROVIDER NOTES
ED Provider Note    Scribed for Alirio Morgan M.D. by Brianda Burgess. 7/11/2020  8:53 AM    Primary care provider: Renetta Lozoya P.A.-C.  Means of arrival: Walk-in  History obtained from: Patient  History limited by: None    CHIEF COMPLAINT  Chief Complaint   Patient presents with   • Sent by MD Dalton        [facility]  Lori Mendes is a 50 y.o. female who presents to the Emergency Department for further treatment by Dr. Chung. She is currently being treated for breast cancer, and had a lumpectomy and partial mastectomy four weeks ago. Sometime last night a wound opned up on her left breast, and she noticed discharge from it. She was advised to come to the ED. She has also been having fevers and chills, and is on Amoxicillin for treatment of infection. She has not had any respiratory symptoms.    REVIEW OF SYSTEMS  Pertinent positives include wound drainage, fever, and chills.   Pertinent negatives include no respiratory symptoms.    All other systems reviewed and negative. See [facility] for further details.       PAST MEDICAL HISTORY   has a past medical history of Anxiety disorder (1/9/2012), Bowel habit changes, Cancer (HCC) (12/20/2019), Heart burn, Lumbar radiculitis (2/7/2012), Malig neoplasm of upper-outer quadrant of unsp female breast (HCC) (12/19/2019), Obesity (BMI 30-39.9) (1/4/2013), and S/P laparoscopic cholecystectomy (10/9/14).    SURGICAL HISTORY   has a past surgical history that includes breast biopsy; madhu by laparoscopy (10/9/2014); cath placement (Right, 1/14/2020); reduction of large breast (Right, 6/11/2020); partial mastectomy (Left, 6/11/2020); node biopsy sentinel (Left, 6/11/2020); lymph node excision (Left, 6/11/2020); breast reconstruction (Left, 6/11/2020); and axillary node dissection (Left, 6/11/2020).    SOCIAL HISTORY  Social History     Tobacco Use   • Smoking status: Never Smoker   • Smokeless tobacco: Former User     Types: Chew   • Tobacco comment: unable to  "quantify chewing   Substance Use Topics   • Alcohol use: No     Alcohol/week: 0.0 oz   • Drug use: Yes     Types: Inhaled, Oral     Comment: marijuana daily - last used 6/10/20      Social History     Substance and Sexual Activity   Drug Use Yes   • Types: Inhaled, Oral    Comment: marijuana daily - last used 6/10/20       FAMILY HISTORY  Family History   Problem Relation Age of Onset   • Hypertension Father    • Cancer Maternal Grandmother 50        breast cancer, cervical cancer and lung cancer   • Breast Cancer Maternal Grandmother    • Cancer Paternal Grandmother 60        breast cancer   • Breast Cancer Paternal Grandmother    • Diabetes Paternal Grandmother    • Osteoporosis Paternal Grandmother        CURRENT MEDICATIONS  Home Medications     Reviewed by Angela Griffin R.N. (Registered Nurse) on 07/11/20 at 0826  Med List Status: Unable to Obtain   Medication Last Dose Status   CALCIUM PO  Active   citalopram (CELEXA) 20 MG Tab  Active   diphenoxylate-atropine (LOMOTIL) 2.5-0.025 MG Tab  Active   gabapentin (NEURONTIN) 300 MG Cap  Active   HYDROcodone-acetaminophen (NORCO) 5-325 MG Tab per tablet  Active   lidocaine-prilocaine (EMLA) 2.5-2.5 % Cream  Active   LORazepam (ATIVAN) 0.5 MG Tab  Active   Magnesium 500 MG Cap  Active   ondansetron (ZOFRAN ODT) 4 MG TABLET DISPERSIBLE  Active   ondansetron (ZOFRAN) 4 MG Tab tablet  Active   POTASSIUM PO  Active   Trastuzumab (HERCEPTIN IV)  Active   VITAMIN D PO  Active                ALLERGIES  No Known Allergies    PHYSICAL EXAM  VITAL SIGNS: /74   Pulse (!) 128   Temp 37.2 °C (98.9 °F) (Temporal)   Resp 16   Ht 1.651 m (5' 5\")   Wt 106.1 kg (233 lb 14.5 oz)   SpO2 91%   BMI 38.92 kg/m²     Nursing note and vitals reviewed.  Constitutional: No distress. Alopecia noted, chronically ill appearing.  HENT: Head is normocephalic and atraumatic. Oropharynx is clear and moist without exudate or erythema.   Eyes: Pupils are equal, round, and reactive to " light. Conjunctiva are normal.   Cardiovascular: Normal rate and regular rhythm. No murmur heard. Normal radial pulses.  Pulmonary/Chest: Breath sounds normal. No wheezes or rales.   Abdominal: Soft and non-tender. No distention    Musculoskeletal: Extremities exhibit normal range of motion without edema or tenderness.   Neurological: Awake, alert and oriented to person, place, and time. No focal deficits noted.  Skin: Entire left breast is warm and erythematous, wound dehiscence at 1:00 position of areolar incision.  Skin is warm and dry. No rash.   Psychiatric: Normal mood and affect. Appropriate for clinical situation.    DIAGNOSTIC STUDIES / PROCEDURES    LABS  Results for orders placed or performed during the hospital encounter of 07/11/20   Lactic acid (lactate)   Result Value Ref Range    Lactic Acid 1.4 0.5 - 2.0 mmol/L   CBC WITH DIFFERENTIAL   Result Value Ref Range    WBC 10.6 4.8 - 10.8 K/uL    RBC 3.29 (L) 4.20 - 5.40 M/uL    Hemoglobin 10.1 (L) 12.0 - 16.0 g/dL    Hematocrit 30.9 (L) 37.0 - 47.0 %    MCV 93.9 81.4 - 97.8 fL    MCH 30.7 27.0 - 33.0 pg    MCHC 32.7 (L) 33.6 - 35.0 g/dL    RDW 46.9 35.9 - 50.0 fL    Platelet Count 130 (L) 164 - 446 K/uL    MPV 11.3 9.0 - 12.9 fL    Neutrophils-Polys 65.90 44.00 - 72.00 %    Lymphocytes 17.30 (L) 22.00 - 41.00 %    Monocytes 13.00 0.00 - 13.40 %    Eosinophils 2.30 0.00 - 6.90 %    Basophils 0.50 0.00 - 1.80 %    Immature Granulocytes 1.00 (H) 0.00 - 0.90 %    Nucleated RBC 0.00 /100 WBC    Neutrophils (Absolute) 7.02 2.00 - 7.15 K/uL    Lymphs (Absolute) 1.84 1.00 - 4.80 K/uL    Monos (Absolute) 1.38 (H) 0.00 - 0.85 K/uL    Eos (Absolute) 0.24 0.00 - 0.51 K/uL    Baso (Absolute) 0.05 0.00 - 0.12 K/uL    Immature Granulocytes (abs) 0.11 0.00 - 0.11 K/uL    NRBC (Absolute) 0.00 K/uL     All labs reviewed by me.    RADIOLOGY  DX-CHEST-PORTABLE (1 VIEW)   Final Result      No acute cardiopulmonary abnormality identified.        The radiologist's  interpretation of all radiological studies have been reviewed by me.    COURSE & MEDICAL DECISION MAKING  Nursing notes, VS, PMSFHx reviewed in chart.     Review of past medical records shows the patient was sent by her surgeon, she is currently being treated for breast cancer. She is on chemo and had a lumpectomy about four weeks ago.     8:53 AM - Patient seen and examined at bedside. Patient will be treated with Vancomycin.  Ordered DX-chest, lactic acid, CBC with differential, CMP, UA, Urine Culture, and Blood Culture to evaluate her symptoms. The differential diagnoses include but are not limited to: presents with mastitis and wound infection     Patient is treated with vancomycin in the emergency department.    8:59 AM - Paged Dr. Chung.     9:55 AM - I discussed the patient's case and the above findings with Dr. Chung (Plastics) who would like patient to be kept NPO. Requested hospitalist admission, will take patient to OR.    10:04 AM spoke with the on-call hospitalist Dr. ARTHUR for admission.    DISPOSITION:  Patient will be hospitalized by Dr. Arthur in stable condition.    FINAL IMPRESSION  1. Mastitis    2. Cellulitis, unspecified cellulitis site    3. Wound infection    4. Malignant neoplasm of left female breast, unspecified estrogen receptor status, unspecified site of breast (HCC)    5. Immunosuppressed due to chemotherapy          Brianda TORRES (Gris), am scribing for, and in the presence of, Alirio Morgan M.D..    Electronically signed by: Brianda Bender), 7/11/2020    Alirio TORRES M.D. personally performed the services described in this documentation, as scribed by Brianda Burgess in my presence, and it is both accurate and complete. C    The note accurately reflects work and decisions made by me.  Alirio Morgan M.D.  7/11/2020  10:05 AM

## 2020-07-11 NOTE — ED NOTES
Med Rec Updated PARTIALLY per Pt at bedside  Allergies Reviewed    Pt currently on a course of Amoxicillin twice daily. Pt does not know course length or strength at this time.    Home Pharmacy closed at this time.

## 2020-07-11 NOTE — PROGRESS NOTES
PLASTICS    Full consult note dictated.  50 year old female known to me; patient had left breast cancer and underwent partial mastectomy with reconstruction on 6/11/20. She had been healing well until about four days ago when she noted fevers/chills.  I saw her in the office two days ago and started Augmentin and ordered an ultrasound with drainage if necessary, but her wound spontaneously opened this am before she could get her ultrasound.  I irrigated the wound in the ER and placed gauze packing.  She is admitted for IV antibiotics and packing changes.  I will re-evaluate her in the morning to determine if more aggressive drainage/debridement is needed in the OR.

## 2020-07-11 NOTE — PROGRESS NOTES
"Pharmacy Kinetics 50 y.o. female on vancomycin day # 1 2020    Currently on Vancomycin 2750mg once then 1500 mg iv q12hr  Provider specified end date: TBD    Indication for Treatment: SSTI of mastectomy site    Pertinent history per medical record: Admitted on 2020 for worsening SSTI of breast with systemic symptoms. Pt had lumpectomy & partial mastectomy and is receiving chemo for breast cancer. On  she was started on amoxicillin due to pain and redness at surgical site and fever of 101F.  On 7/10 one of the wounds opened spontaneously and began draining pus and pt continued to report systemic symptoms.  Pt was advised to report to ED.    Other antibiotics: Unasyn 3g q6h    Allergies: Patient has no known allergies.     List concerns for renal function: obesity    Pertinent cultures to date:   20 - wound cx & blood cx x2 pending    MRSA nares swab: n/a    Recent Labs     20  0918   WBC 10.6   NEUTSPOLYS 65.90     Recent Labs     20  1340 20  0918   BUN 20 10   CREATININE 1.16 0.81   ALBUMIN 4.0 3.6     No results for input(s): VANCOTROUGH, VANCOPEAK, VANCORANDOM in the last 72 hours.No intake or output data in the 24 hours ending 20 1121   /68   Pulse (!) 58   Temp 37.2 °C (98.9 °F) (Temporal)   Resp (!) 42   Ht 1.651 m (5' 5\")   Wt 106.1 kg (233 lb 14.5 oz)   SpO2 93%  Temp (24hrs), Av.2 °C (98.9 °F), Min:37.2 °C (98.9 °F), Max:37.2 °C (98.9 °F)      A/P   1. Vancomycin dose change: initiate Vancomycin 2750mg once then 1500 mg iv q12hr  2. Next vancomycin level:  @0830 prior to 5th dose   3. Goal trough: 10-15mcg/ml  4. Comments: monitor renal function and response to therapy.  Adjust regimen or check trough earlier if clinically indicated.    Denys Juarez, PharmD  "

## 2020-07-11 NOTE — CONSULTS
DATE OF SERVICE:  07/11/2020    REQUESTING PHYSICIAN:  Alirio Morgan MD for Ezra Hair MD    REASON FOR CONSULTATION:  Left breast infection.    HISTORY:  This is a 50-year-old female well known to me who was diagnosed with   left breast cancer last year.  She underwent neoadjuvant therapy and   ultimately had a partial mastectomy by Dr. Najera with immediate reconstruction   via local tissue rearrangement and a right breast reduction for symmetry by   myself on 06/11/2020.  She did well during her immediate postoperative period   and was started back on chemotherapy.  Her last dose of chemotherapy was on   07/01.  She states that on 07/07 she started noticing some fevers and chills   and was evaluated at an urgent care.  Apparently, they associated this fever   with her chemotherapy and did not prescribe any antibiotics, but did instruct   her to follow up in the ER for worsening.  She was then seen for followup at   [facility], where there was concern for infection of her left   breast as it was quite red.  She received a dose of IV antibiotics there, and   then followed up with me in the office the following morning.  Apparently, she   was supposed to have received a prescription for oral antibiotics, but never   did.  When I saw her on 07/09, I noted erythema of the left breast.  There is   no palpable areas of fluctuance.  I started her on Augmentin and ordered a   stat ultrasound with percutaneous drainage if indicated.  Apparently, even   though this was ordered stat, it was not scheduled until next week.  The   patient then called me this morning stating that a portion of her incision had   opened up and drained a large amount of green pus.  I instructed her to go to   the emergency department for evaluation so that I could see her.  We   discussed that she would need to be admitted for IV antibiotics and   potentially taken to the operating room for washout of her left breast.    PAST  MEDICAL HISTORY:  Left breast cancer, gastroesophageal reflux disease and   depression/anxiety.    SURGICAL HISTORY:  Left partial mastectomy with local tissue rearrangement   reconstruction and right breast reduction on 06/11/2020; laparoscopic   cholecystectomy.    OUTPATIENT MEDICATIONS:  Gabapentin 300 mg b.i.d., citalopram 20 mg daily,   amoxicillin 875 mL twice per day as well as vitamins and minerals.    ALLERGIES:  No known drug allergies.    SOCIAL HISTORY:  The patient is a nonsmoker.  She reports she does not drink   alcohol.    FAMILY HISTORY:  Grandmother with breast cancer.    PHYSICAL EXAMINATION:  VITAL SIGNS:  Current temperature 98.9, blood pressure 105/68, pulse 58.  GENERAL:  Well-developed, well-nourished female, seen in the emergency   department in no acute distress.  HEENT:  Normocephalic, atraumatic.  Extraocular movements are intact.    Oropharynx is clear.  NECK:  Supple, full range of motion.  CHEST:  No respiratory distress.  ABDOMEN:  Soft, nontender.  BREASTS:  Her left breast shows a healing Wise pattern skin incision.  The   superior aspect of the areolar incision has opened approximately 2 cm.  There   is currently a minimal amount of purulent drainage.  She does have marked   erythema over the majority of the anterior surface of her breast, which is   warm to the touch.  There are no obvious areas of fluctuance.  I probed the   area with a sterile swab, and noted a cavity, which extends up along the upper   outer and lateral portions of the breast.  This varies in depth from   approximately 4 to 6 cm.  This is located just deep to the skin flaps that   were raised during the partial mastectomy.  Right breast shows a well healing   Wise pattern incision without erythema.  EXTREMITIES:  Full range of motion.  SKIN:  Otherwise, warm and dry.  PSYCHIATRIC:  Appropriate mood and affect.    PERTINENT LABORATORY DATA:  White blood cell count 10.6, hemoglobin 10.1,   hematocrit 30.9,  platelets 130.  The patient stated that she had a low white   count earlier this week when her labs were checked at [facility], but   unfortunately these results are not available.    ASSESSMENT AND PLAN:  Left breast abscess at site of prior partial mastectomy   and reconstruction.  This abscess has opened and drained spontaneously.  At   the current time, I feel it is worth a trial of irrigation in the emergency   department, followed by admission for IV antibiotics rather than going   straight to the operating room.  I did not note any loculations when I was   exploring the wound with a swab.  I have irrigated the wound with a liter of   sterile saline via a syringe until the effluent was clear.  I cultured the   wounds prior to doing this.  She was then packed with half-inch gauze packing.    She is being admitted for IV vancomycin and Unasyn and I will reevaluate her   in the morning.  I have also marked the area of erythema with a permanent   marker.  As long as she is making improvement tomorrow, we can continue with   daily dressing changes and antibiotics.  If there appears to be any worsening,   we will evaluate obtaining an ultrasound to see if there is another abscess   versus just going to the operating room to open her entire incision back up   for another washout.  All of her questions have been answered and she states   she understands her treatment plan.       ____________________________________     LUDWIG DURÁN MD [facility] / [facility]    DD:  07/11/2020 12:36:26  DT:  07/11/2020 13:35:30    D#:  [id number]  Job#:  [id number]

## 2020-07-11 NOTE — PROGRESS NOTES
ED admit    51 yo woman with breast cancer on chemotherapy and had partial left breast mastectomy with Dr. Chung 1 month ago. She developed redness, pain, and drainage from open wound.  IV vanc given  ERP Consulted Dr. Chung, possible OR today  Will order COVID screen    Dr. Hair to admit

## 2020-07-12 PROBLEM — N61.1 ABSCESS OF LEFT BREAST: Status: ACTIVE | Noted: 2020-07-11

## 2020-07-12 LAB
ANION GAP SERPL CALC-SCNC: 12 MMOL/L (ref 7–16)
BASOPHILS # BLD AUTO: 0.8 % (ref 0–1.8)
BASOPHILS # BLD: 0.06 K/UL (ref 0–0.12)
BUN SERPL-MCNC: 9 MG/DL (ref 8–22)
CALCIUM SERPL-MCNC: 9.5 MG/DL (ref 8.5–10.5)
CHLORIDE SERPL-SCNC: 101 MMOL/L (ref 96–112)
CO2 SERPL-SCNC: 24 MMOL/L (ref 20–33)
CREAT SERPL-MCNC: 0.66 MG/DL (ref 0.5–1.4)
EOSINOPHIL # BLD AUTO: 0.66 K/UL (ref 0–0.51)
EOSINOPHIL NFR BLD: 8.6 % (ref 0–6.9)
ERYTHROCYTE [DISTWIDTH] IN BLOOD BY AUTOMATED COUNT: 46.8 FL (ref 35.9–50)
GLUCOSE SERPL-MCNC: 105 MG/DL (ref 65–99)
HCT VFR BLD AUTO: 29.7 % (ref 37–47)
HGB BLD-MCNC: 9.7 G/DL (ref 12–16)
IMM GRANULOCYTES # BLD AUTO: 0.31 K/UL (ref 0–0.11)
IMM GRANULOCYTES NFR BLD AUTO: 4 % (ref 0–0.9)
LYMPHOCYTES # BLD AUTO: 2.37 K/UL (ref 1–4.8)
LYMPHOCYTES NFR BLD: 30.9 % (ref 22–41)
MCH RBC QN AUTO: 30.8 PG (ref 27–33)
MCHC RBC AUTO-ENTMCNC: 32.7 G/DL (ref 33.6–35)
MCV RBC AUTO: 94.3 FL (ref 81.4–97.8)
MONOCYTES # BLD AUTO: 0.75 K/UL (ref 0–0.85)
MONOCYTES NFR BLD AUTO: 9.8 % (ref 0–13.4)
NEUTROPHILS # BLD AUTO: 3.52 K/UL (ref 2–7.15)
NEUTROPHILS NFR BLD: 45.9 % (ref 44–72)
NRBC # BLD AUTO: 0 K/UL
NRBC BLD-RTO: 0 /100 WBC
PLATELET # BLD AUTO: 144 K/UL (ref 164–446)
PMV BLD AUTO: 10.9 FL (ref 9–12.9)
POTASSIUM SERPL-SCNC: 3.4 MMOL/L (ref 3.6–5.5)
RBC # BLD AUTO: 3.15 M/UL (ref 4.2–5.4)
SODIUM SERPL-SCNC: 137 MMOL/L (ref 135–145)
WBC # BLD AUTO: 7.7 K/UL (ref 4.8–10.8)

## 2020-07-12 PROCEDURE — 85025 COMPLETE CBC W/AUTO DIFF WBC: CPT

## 2020-07-12 PROCEDURE — 36415 COLL VENOUS BLD VENIPUNCTURE: CPT

## 2020-07-12 PROCEDURE — A9270 NON-COVERED ITEM OR SERVICE: HCPCS | Performed by: HOSPITALIST

## 2020-07-12 PROCEDURE — 700105 HCHG RX REV CODE 258: Performed by: HOSPITALIST

## 2020-07-12 PROCEDURE — 700102 HCHG RX REV CODE 250 W/ 637 OVERRIDE(OP): Performed by: HOSPITALIST

## 2020-07-12 PROCEDURE — 700111 HCHG RX REV CODE 636 W/ 250 OVERRIDE (IP): Performed by: HOSPITALIST

## 2020-07-12 PROCEDURE — 99232 SBSQ HOSP IP/OBS MODERATE 35: CPT | Performed by: FAMILY MEDICINE

## 2020-07-12 PROCEDURE — 770001 HCHG ROOM/CARE - MED/SURG/GYN PRIV*

## 2020-07-12 PROCEDURE — 80048 BASIC METABOLIC PNL TOTAL CA: CPT

## 2020-07-12 RX ORDER — SODIUM CHLORIDE 9 MG/ML
INJECTION, SOLUTION INTRAVENOUS
Status: ACTIVE
Start: 2020-07-12 | End: 2020-07-12

## 2020-07-12 RX ADMIN — CITALOPRAM HYDROBROMIDE 30 MG: 20 TABLET ORAL at 04:43

## 2020-07-12 RX ADMIN — ENOXAPARIN SODIUM 40 MG: 40 INJECTION SUBCUTANEOUS at 04:44

## 2020-07-12 RX ADMIN — SODIUM CHLORIDE 3 G: 900 INJECTION INTRAVENOUS at 20:57

## 2020-07-12 RX ADMIN — VANCOMYCIN HYDROCHLORIDE 1500 MG: 500 INJECTION, POWDER, LYOPHILIZED, FOR SOLUTION INTRAVENOUS at 22:41

## 2020-07-12 RX ADMIN — SODIUM CHLORIDE 3 G: 900 INJECTION INTRAVENOUS at 13:54

## 2020-07-12 RX ADMIN — GABAPENTIN 600 MG: 300 CAPSULE ORAL at 04:43

## 2020-07-12 RX ADMIN — SODIUM CHLORIDE 3 G: 900 INJECTION INTRAVENOUS at 08:01

## 2020-07-12 RX ADMIN — POTASSIUM CHLORIDE: 149 INJECTION, SOLUTION, CONCENTRATE INTRAVENOUS at 09:40

## 2020-07-12 RX ADMIN — GABAPENTIN 600 MG: 300 CAPSULE ORAL at 16:29

## 2020-07-12 RX ADMIN — SODIUM CHLORIDE 3 G: 900 INJECTION INTRAVENOUS at 02:31

## 2020-07-12 RX ADMIN — VANCOMYCIN HYDROCHLORIDE 1500 MG: 500 INJECTION, POWDER, LYOPHILIZED, FOR SOLUTION INTRAVENOUS at 10:01

## 2020-07-12 RX ADMIN — LOPERAMIDE HYDROCHLORIDE 2 MG: 2 CAPSULE ORAL at 04:43

## 2020-07-12 ASSESSMENT — ENCOUNTER SYMPTOMS
COUGH: 0
DIZZINESS: 0
BACK PAIN: 0
VOMITING: 0
ABDOMINAL PAIN: 0
BLURRED VISION: 0
DIARRHEA: 0
FEVER: 0
SENSORY CHANGE: 0
WHEEZING: 0
FLANK PAIN: 0
HEADACHES: 0
NECK PAIN: 0
SPEECH CHANGE: 0
NAUSEA: 0
SHORTNESS OF BREATH: 0
FOCAL WEAKNESS: 0
WEAKNESS: 0
MYALGIAS: 0
CHILLS: 0
PALPITATIONS: 0
SORE THROAT: 0
HEARTBURN: 0
NERVOUS/ANXIOUS: 0

## 2020-07-12 NOTE — PROGRESS NOTES
[facility] Medicine Daily Progress Note    Date of Service  7/12/2020    Chief Complaint  50 y.o. female admitted 7/11/2020 with left breast abscess    Hospital Course  Admitted with left breast cellulitis and abscess, failed outpatient antibiotic treatment.  History of left partial mastectomy with reconstruction secondary to breast cancer on 6/11/2020.    Interval Problem Update  Cellulitis/Abscess - culture pending    Consultants/Specialty  Plastic surgery    Code Status  Full    Disposition  TBD    Review of Systems  Review of Systems   Constitutional: Negative for chills and fever.   HENT: Negative for congestion, hearing loss and sore throat.    Eyes: Negative for blurred vision.   Respiratory: Negative for cough, shortness of breath and wheezing.    Cardiovascular: Negative for chest pain, palpitations and leg swelling.   Gastrointestinal: Negative for abdominal pain, diarrhea, heartburn, nausea and vomiting.   Genitourinary: Negative for dysuria, flank pain and hematuria.   Musculoskeletal: Negative for back pain, joint pain, myalgias and neck pain.   Skin: Negative for rash.   Neurological: Negative for dizziness, sensory change, speech change, focal weakness, weakness and headaches.   Psychiatric/Behavioral: The patient is not nervous/anxious.         Physical Exam  Temp:  [36.2 °C (97.2 °F)-36.6 °C (97.9 °F)] 36.6 °C (97.9 °F)  Pulse:  [58-96] 79  Resp:  [16-24] 18  BP: ()/(45-73) 122/53  SpO2:  [91 %-94 %] 92 %    Physical Exam  Vitals signs and nursing note reviewed.   Constitutional:       Appearance: She is obese.   HENT:      Head: Normocephalic and atraumatic.      Nose: No congestion.      Mouth/Throat:      Mouth: Mucous membranes are moist.   Eyes:      Extraocular Movements: Extraocular movements intact.      Conjunctiva/sclera: Conjunctivae normal.      Pupils: Pupils are equal, round, and reactive to light.   Neck:      Musculoskeletal: No muscular tenderness.   Cardiovascular:      Rate  and Rhythm: Normal rate and regular rhythm.   Pulmonary:      Effort: Pulmonary effort is normal.      Breath sounds: Normal breath sounds.   Abdominal:      General: Bowel sounds are normal. There is no distension.      Palpations: Abdomen is soft.      Tenderness: There is no abdominal tenderness. There is no guarding or rebound.   Musculoskeletal:      Right lower leg: No edema.      Left lower leg: No edema.   Lymphadenopathy:      Cervical: No cervical adenopathy.   Skin:     Findings: Erythema (left breast) present.   Neurological:      General: No focal deficit present.      Mental Status: She is alert and oriented to person, place, and time.      Cranial Nerves: No cranial nerve deficit.         Fluids  No intake or output data in the 24 hours ending 07/12/20 1030    Laboratory  Recent Labs     07/11/20 0918 07/12/20  0620   WBC 10.6 7.7   RBC 3.29* 3.15*   HEMOGLOBIN 10.1* 9.7*   HEMATOCRIT 30.9* 29.7*   MCV 93.9 94.3   MCH 30.7 30.8   MCHC 32.7* 32.7*   RDW 46.9 46.8   PLATELETCT 130* 144*   MPV 11.3 10.9     Recent Labs     07/11/20 0918 07/12/20  0620   SODIUM 131* 137   POTASSIUM 3.1* 3.4*   CHLORIDE 94* 101   CO2 24 24   GLUCOSE 118* 105*   BUN 10 9   CREATININE 0.81 0.66   CALCIUM 9.6 9.5                   Imaging  DX-CHEST-PORTABLE (1 VIEW)   Final Result      No acute cardiopulmonary abnormality identified.           Assessment/Plan  * Cellulitis and Abscess of left breast- (present on admission)  Assessment & Plan  IV Unasyn and Vancomycin  Follow cultures  Wound care  Plastic surgery following    Hypokalemia- (present on admission)  Assessment & Plan  IV KCl, follow bmp, Mg, Ph    Hyponatremia- (present on admission)  Assessment & Plan  Follow bmp    Malignant neoplasm of left breast in female, estrogen receptor positive (HCC)- (present on admission)  Assessment & Plan  Left partial mastectomy with reconstruction 6/11/2020  Follow up with Oncology as outpatient    Anemia- (present on  admission)  Assessment & Plan  Follow cbc    Anxiety disorder- (present on admission)  Assessment & Plan  Celexa       VTE prophylaxis: Lovenox

## 2020-07-12 NOTE — PROGRESS NOTES
"Pharmacy Kinetics 50 y.o. female on vancomycin day # 2   2020    Currently on Vancomycin 1500 mg iv q12hr (0900 2100)  Provider specified end date: TBD    Indication for Treatment: SSTI    Pertinent history per medical record: Admitted on 2020 for mastitis.Admitted with left breast cellulitis and abscess, failed outpatient antibiotic treatment.  History of left partial mastectomy with reconstruction secondary to breast cancer on 2020. Hx of Malignant neoplasm of left breast, follow up with Oncology as outpatient.    Other antibiotics: Unasyn 3g iv q6hr     Allergies: Patient has no known allergies.     List concerns for renal function: none     Pertinent cultures to date:   20:PBCx2:NGTD  20:Lt Breast,WND:Few Gram positive cocci.     MRSA nares swab if pneumonia is a concern (ordered/positive/negative/n-a): NA     Recent Labs     20  0918 20  0620   WBC 10.6 7.7   NEUTSPOLYS 65.90 45.90     Recent Labs     20  0918 20  0620   BUN 10 9   CREATININE 0.81 0.66   ALBUMIN 3.6  --      No results for input(s): VANCOTROUGH, VANCOPEAK, VANCORANDOM in the last 72 hours.No intake or output data in the 24 hours ending 20 1127   /53   Pulse 79   Temp 36.6 °C (97.9 °F) (Temporal)   Resp 18   Ht 1.651 m (5' 5\")   Wt 106.1 kg (233 lb 14.5 oz)   SpO2 92%  Temp (24hrs), Av.3 °C (97.4 °F), Min:36.2 °C (97.2 °F), Max:36.6 °C (97.9 °F)      A/P   1. Vancomycin dose change: No change   2. Next vancomycin level: 20@0830  3. Goal trough: 10-15 mcg/mL   4. Comments: No leukocytosis, afebrile , cx Few Gram positive cocci (~24hr). Renal indices decreased over interval, level tomorrow. Continue current vancomycin dose, follow cx results daily.     Edvin Castillo PharmD BCPS   "

## 2020-07-12 NOTE — CARE PLAN
Problem: Knowledge Deficit  Goal: Knowledge of disease process/condition, treatment plan, diagnostic tests, and medications will improve  Outcome: PROGRESSING AS EXPECTED  Intervention: Explain information regarding disease process/condition, treatment plan, diagnostic tests, and medications and document in education  Note: Discussed POC with pt, all questions and concerns were addressed.      Problem: Pain Management  Goal: Pain level will decrease to patient's comfort goal  Outcome: PROGRESSING AS EXPECTED  Intervention: Educate and implement non-pharmacologic comfort measures. Examples: relaxation, distration, play therapy, activity therapy, massage, etc.  Note: Pt provided extra pillows and blankets for comfort and repositioning, watching tv as distraction.

## 2020-07-12 NOTE — CARE PLAN
Problem: Communication  Goal: The ability to communicate needs accurately and effectively will improve  Outcome: PROGRESSING AS EXPECTED  Intervention: Educate patient and significant other/support system about the plan of care, procedures, treatments, medications and allow for questions  Flowsheets (Taken 7/12/2020 1619)  Pt & Family Have Been Educated on Methods Available to Report Concerns Related to Care, Treatment, Services, and Patient Safety Issues: Yes  Note: Plan of Care discussed, all questions answered. Pt effectively communicates needs to staff.       Problem: Discharge Barriers/Planning  Goal: Patient's continuum of care needs will be met  Outcome: PROGRESSING AS EXPECTED  Note: Pt hopes to d/c home today.

## 2020-07-12 NOTE — PROGRESS NOTES
PLASTICS    WBC 7.7  Tmax 97.9  Cultures pending  Left breast erythema significantly improved.  Old packing removed, breast abscess cavity irrigated, new packing placed.    51 y/o female with breast abscess, s/p I&D in ER yesterday.  She is improving well and currently will not need any further intervention in the OR.  Will continue with daily irrigation/packing changes, antibiotics per hospitalists.  Will continue to follow while in-house.

## 2020-07-13 ENCOUNTER — APPOINTMENT (OUTPATIENT)
Dept: RADIOLOGY | Facility: MEDICAL CENTER | Age: 50
End: 2020-07-13
Attending: SURGERY
Payer: COMMERCIAL

## 2020-07-13 VITALS
HEART RATE: 74 BPM | BODY MASS INDEX: 38.97 KG/M2 | RESPIRATION RATE: 16 BRPM | WEIGHT: 233.91 LBS | DIASTOLIC BLOOD PRESSURE: 57 MMHG | OXYGEN SATURATION: 100 % | TEMPERATURE: 97.4 F | HEIGHT: 65 IN | SYSTOLIC BLOOD PRESSURE: 131 MMHG

## 2020-07-13 LAB
ANION GAP SERPL CALC-SCNC: 11 MMOL/L (ref 7–16)
ANISOCYTOSIS BLD QL SMEAR: ABNORMAL
BACTERIA WND AEROBE CULT: ABNORMAL
BACTERIA WND AEROBE CULT: ABNORMAL
BASOPHILS # BLD AUTO: 0 % (ref 0–1.8)
BASOPHILS # BLD: 0 K/UL (ref 0–0.12)
BUN SERPL-MCNC: 7 MG/DL (ref 8–22)
CALCIUM SERPL-MCNC: 9.5 MG/DL (ref 8.5–10.5)
CHLORIDE SERPL-SCNC: 102 MMOL/L (ref 96–112)
CO2 SERPL-SCNC: 27 MMOL/L (ref 20–33)
CREAT SERPL-MCNC: 0.67 MG/DL (ref 0.5–1.4)
EOSINOPHIL # BLD AUTO: 0.6 K/UL (ref 0–0.51)
EOSINOPHIL NFR BLD: 9.6 % (ref 0–6.9)
ERYTHROCYTE [DISTWIDTH] IN BLOOD BY AUTOMATED COUNT: 46.2 FL (ref 35.9–50)
GLUCOSE SERPL-MCNC: 108 MG/DL (ref 65–99)
GRAM STN SPEC: ABNORMAL
HCT VFR BLD AUTO: 28.8 % (ref 37–47)
HGB BLD-MCNC: 9.3 G/DL (ref 12–16)
HYPOCHROMIA BLD QL SMEAR: ABNORMAL
LYMPHOCYTES # BLD AUTO: 2.1 K/UL (ref 1–4.8)
LYMPHOCYTES NFR BLD: 33.3 % (ref 22–41)
MAGNESIUM SERPL-MCNC: 1.4 MG/DL (ref 1.5–2.5)
MANUAL DIFF BLD: NORMAL
MCH RBC QN AUTO: 30.1 PG (ref 27–33)
MCHC RBC AUTO-ENTMCNC: 32.3 G/DL (ref 33.6–35)
MCV RBC AUTO: 93.2 FL (ref 81.4–97.8)
METAMYELOCYTES NFR BLD MANUAL: 0.9 %
MICROCYTES BLD QL SMEAR: ABNORMAL
MONOCYTES # BLD AUTO: 0.44 K/UL (ref 0–0.85)
MONOCYTES NFR BLD AUTO: 7 % (ref 0–13.4)
MORPHOLOGY BLD-IMP: NORMAL
MYELOCYTES NFR BLD MANUAL: 0.9 %
NEUTROPHILS # BLD AUTO: 3.04 K/UL (ref 2–7.15)
NEUTROPHILS NFR BLD: 47.4 % (ref 44–72)
NEUTS BAND NFR BLD MANUAL: 0.9 % (ref 0–10)
NRBC # BLD AUTO: 0.02 K/UL
NRBC BLD-RTO: 0.3 /100 WBC
OVALOCYTES BLD QL SMEAR: NORMAL
PHOSPHATE SERPL-MCNC: 3.6 MG/DL (ref 2.5–4.5)
PLATELET # BLD AUTO: 180 K/UL (ref 164–446)
PLATELET BLD QL SMEAR: NORMAL
PMV BLD AUTO: 11.5 FL (ref 9–12.9)
POIKILOCYTOSIS BLD QL SMEAR: NORMAL
POTASSIUM SERPL-SCNC: 3.4 MMOL/L (ref 3.6–5.5)
RBC # BLD AUTO: 3.09 M/UL (ref 4.2–5.4)
RBC BLD AUTO: PRESENT
SIGNIFICANT IND 70042: ABNORMAL
SITE SITE: ABNORMAL
SODIUM SERPL-SCNC: 140 MMOL/L (ref 135–145)
SOURCE SOURCE: ABNORMAL
VANCOMYCIN TROUGH SERPL-MCNC: 27.1 UG/ML (ref 10–20)
WBC # BLD AUTO: 6.3 K/UL (ref 4.8–10.8)

## 2020-07-13 PROCEDURE — 36415 COLL VENOUS BLD VENIPUNCTURE: CPT

## 2020-07-13 PROCEDURE — 85007 BL SMEAR W/DIFF WBC COUNT: CPT

## 2020-07-13 PROCEDURE — 80202 ASSAY OF VANCOMYCIN: CPT

## 2020-07-13 PROCEDURE — 700105 HCHG RX REV CODE 258: Performed by: HOSPITALIST

## 2020-07-13 PROCEDURE — 700111 HCHG RX REV CODE 636 W/ 250 OVERRIDE (IP): Performed by: HOSPITALIST

## 2020-07-13 PROCEDURE — 84100 ASSAY OF PHOSPHORUS: CPT

## 2020-07-13 PROCEDURE — 83735 ASSAY OF MAGNESIUM: CPT

## 2020-07-13 PROCEDURE — A9270 NON-COVERED ITEM OR SERVICE: HCPCS | Performed by: HOSPITALIST

## 2020-07-13 PROCEDURE — 80048 BASIC METABOLIC PNL TOTAL CA: CPT

## 2020-07-13 PROCEDURE — 99239 HOSP IP/OBS DSCHRG MGMT >30: CPT | Performed by: FAMILY MEDICINE

## 2020-07-13 PROCEDURE — 700102 HCHG RX REV CODE 250 W/ 637 OVERRIDE(OP): Performed by: HOSPITALIST

## 2020-07-13 PROCEDURE — 85027 COMPLETE CBC AUTOMATED: CPT

## 2020-07-13 RX ORDER — AMOXICILLIN AND CLAVULANATE POTASSIUM 875; 125 MG/1; MG/1
1 TABLET, FILM COATED ORAL 2 TIMES DAILY
Qty: 14 TAB | Refills: 0
Start: 2020-07-13 | End: 2020-07-20

## 2020-07-13 RX ADMIN — VANCOMYCIN HYDROCHLORIDE 1500 MG: 500 INJECTION, POWDER, LYOPHILIZED, FOR SOLUTION INTRAVENOUS at 10:23

## 2020-07-13 RX ADMIN — SODIUM CHLORIDE 3 G: 900 INJECTION INTRAVENOUS at 09:13

## 2020-07-13 RX ADMIN — ENOXAPARIN SODIUM 40 MG: 40 INJECTION SUBCUTANEOUS at 04:33

## 2020-07-13 RX ADMIN — GABAPENTIN 600 MG: 300 CAPSULE ORAL at 04:33

## 2020-07-13 RX ADMIN — SODIUM CHLORIDE 3 G: 900 INJECTION INTRAVENOUS at 02:19

## 2020-07-13 RX ADMIN — CITALOPRAM HYDROBROMIDE 30 MG: 20 TABLET ORAL at 04:33

## 2020-07-13 NOTE — DISCHARGE INSTRUCTIONS
Discharge Instructions    Discharged to home by car with relative. Discharged via wheelchair, hospital escort: Yes.  Special equipment needed: Not Applicable    Be sure to schedule a follow-up appointment with your primary care doctor or any specialists as instructed.     Discharge Plan:   Diet Plan: Discussed  Activity Level: Discussed  Confirmed Follow up Appointment: Patient to Call and Schedule Appointment  Confirmed Symptoms Management: Discussed  Medication Reconciliation Updated: Yes    I understand that a diet low in cholesterol, fat, and sodium is recommended for good health. Unless I have been given specific instructions below for another diet, I accept this instruction as my diet prescription.   Other diet: regular    Special Instructions: Dressing change daily with packing.   7/16 appointment with Dr. Chung  Finish antibiotics course completely    · Is patient discharged on Warfarin / Coumadin?   No     Depression / Suicide Risk    As you are discharged from this [facility] facility, it is important to learn how to keep safe from harming yourself.    Recognize the warning signs:  · Abrupt changes in personality, positive or negative- including increase in energy   · Giving away possessions  · Change in eating patterns- significant weight changes-  positive or negative  · Change in sleeping patterns- unable to sleep or sleeping all the time   · Unwillingness or inability to communicate  · Depression  · Unusual sadness, discouragement and loneliness  · Talk of wanting to die  · Neglect of personal appearance   · Rebelliousness- reckless behavior  · Withdrawal from people/activities they love  · Confusion- inability to concentrate     If you or a loved one observes any of these behaviors or has concerns about self-harm, here's what you can do:  · Talk about it- your feelings and reasons for harming yourself  · Remove any means that you might use to hurt yourself (examples: pills, rope, extension cords,  firearm)  · Get professional help from the community (Mental Health, Substance Abuse, psychological counseling)  · Do not be alone:Call your Safe Contact- someone whom you trust who will be there for you.  · Call your local CRISIS HOTLINE 537-0819 or 531-615-9217  · Call your local Children's Mobile Crisis Response Team [place] (760) 275-0498 or www.[employer]  · Call the toll free National Suicide Prevention Hotlines   · National Suicide Prevention Lifeline 837-135-TWUR (7368)  · National Hope Line Network 800-SUICIDE (989-1317)

## 2020-07-13 NOTE — DISCHARGE SUMMARY
Discharge Summary    CHIEF COMPLAINT ON ADMISSION  Chief Complaint   Patient presents with   • Sent by MD Dalton        Reason for Admission  wound check     Admission Date  7/11/2020    CODE STATUS  Full Code    HPI & HOSPITAL COURSE  This is a 50 y.o. female here with cellulitis and abscess of the left breast.  She has known history of breast cancer, underwent partial mastectomy and reconstruction.  She was following with plastic surgery, she was given antibiotics from Augmentin, however she failed outpatient antibiotic treatment.  Plastic surgery was consulted the case, I&D was done, she was placed on antibiotics the form of IV vancomycin and Unasyn.  Cultures later showed MSSA.  Wound care was done.  Plastic surgery has cleared her for discharge with follow-up with them as outpatient.       Therefore, she is discharged in good and stable condition to home with close outpatient follow-up.    The patient met 2-midnight criteria for an inpatient stay at the time of discharge.    Discharge Date  7/13/2020    FOLLOW UP ITEMS POST DISCHARGE  Follow-up with plastic surgery as scheduled  Follow-up with oncology as scheduled    DISCHARGE DIAGNOSES  Principal Problem:    Cellulitis and Abscess of left breast POA: Yes  Active Problems:    Anemia POA: Yes    Malignant neoplasm of left breast in female, estrogen receptor positive (HCC) POA: Yes      Overview: IMO load March 2020    Hyponatremia POA: Yes    Hypokalemia POA: Yes    Anxiety disorder (Chronic) POA: Yes      Overview: Started in 2004, with panic attacks since her divorce  Resolved Problems:    * No resolved hospital problems. *      FOLLOW UP  No future appointments.  [facility]  [address]  [place] [id number]  [phone]    Follow-Up    Renetta Lozoya, P.A.-C.  [address] [id number] [phone]    Schedule an appointment as soon as possible for a visit  Please call to schedule your hospital  follow up with your primary care provider. Thank you!       MEDICATIONS ON DISCHARGE     Medication List      START taking these medications      Instructions   amoxicillin-clavulanate 875-125 MG Tabs  Commonly known as:  AUGMENTIN   Take 1 Tab by mouth 2 times a day for 7 days.  Dose:  1 Tab        CONTINUE taking these medications      Instructions   CALCIUM PO   Take 1 Dose by mouth every morning. Unknown OTC Strength.  Dose:  1 Dose     citalopram 20 MG Tabs  Commonly known as:  CELEXA   Take 30 mg by mouth every day.  Dose:  30 mg     diphenoxylate-atropine 2.5-0.025 MG Tabs  Commonly known as:  LOMOTIL   Take 1 Tab by mouth 3 times a day as needed for Diarrhea.  Dose:  1 Tab     gabapentin 300 MG Caps  Commonly known as:  NEURONTIN   Take 600 mg by mouth 2 Times a Day.  Dose:  600 mg     lidocaine-prilocaine 2.5-2.5 % Crea  Commonly known as:  EMLA   Apply 1 g to affected area(s) 1 time daily as needed (Port Access).  Dose:  1 g     loperamide 2 MG Caps  Commonly known as:  IMODIUM   Take 2 mg by mouth 4 times a day as needed for Diarrhea.  Dose:  2 mg     Magnesium 500 MG Caps   Take 500 mg by mouth every morning.  Dose:  500 mg     ondansetron 4 MG Tabs tablet  Commonly known as:  ZOFRAN   Take 4 mg by mouth every 8 hours as needed for Nausea/Vomiting.  Dose:  4 mg     POTASSIUM PO   Take 1 Dose by mouth every morning. Unknown OTC Strength.  Dose:  1 Dose     prochlorperazine 10 MG Tabs  Commonly known as:  COMPAZINE   Take 10 mg by mouth every 6 hours as needed for Nausea/Vomiting.  Dose:  10 mg     VITAMIN D PO   Take 1 Dose by mouth every morning. Unknown OTC Strength.  Dose:  1 Dose        STOP taking these medications    AMOXICILLIN PO            Allergies  No Known Allergies    DIET  Orders Placed This Encounter   Procedures   • Diet Order Regular     Standing Status:   Standing     Number of Occurrences:   1     Order Specific Question:   Diet:     Answer:   Regular [1]       ACTIVITY  As  tolerated.  Weight bearing as tolerated    CONSULTATIONS  Plastic surgery - Chung    PROCEDURES  I&D of left breast 7/11/2020    LABORATORY  Lab Results   Component Value Date    SODIUM 140 07/13/2020    POTASSIUM 3.4 (L) 07/13/2020    CHLORIDE 102 07/13/2020    CO2 27 07/13/2020    GLUCOSE 108 (H) 07/13/2020    BUN 7 (L) 07/13/2020    CREATININE 0.67 07/13/2020        Lab Results   Component Value Date    WBC 6.3 07/13/2020    HEMOGLOBIN 9.3 (L) 07/13/2020    HEMATOCRIT 28.8 (L) 07/13/2020    PLATELETCT 180 07/13/2020        Total time of the discharge process exceeds 40 minutes.

## 2020-07-13 NOTE — PROGRESS NOTES
PLASTICS     Patient POD #2 from I&D of left breast abscess.  Remains afebrile, WBC 6.3  Cultures show Staph aureus, methicillin sensitive.  Breast erythema continuing to resolve, significantly improved from two days ago.  Packing changed today, no further purulence.    She is okay for discharge home from my standpoint with oral antibiotics (she still has a prescription for Augmentin from last week).  She will need daily packing changes with gauze strip packing, and states that she has a niece who is a nursing student that can assist her with wound care.  I will see her back in the office this Thursday 7/16 at 12:30.

## 2020-07-13 NOTE — PROGRESS NOTES
Pt discharged home via wheelchair. IV d/c'd. All personal belongings sent with pt including cell phone. Discharge summary reviewed with pt and all questions answered. Pt agreeable to D/C plan.

## 2020-07-13 NOTE — CARE PLAN
Problem: Communication  Goal: The ability to communicate needs accurately and effectively will improve  Outcome: PROGRESSING AS EXPECTED  Intervention: Educate patient and significant other/support system about the plan of care, procedures, treatments, medications and allow for questions  Flowsheets (Taken 7/13/2020 1111)  Pt & Family Have Been Educated on Methods Available to Report Concerns Related to Care, Treatment, Services, and Patient Safety Issues: Yes  Note: Plan of Care discussed, all questions answered. Pt effectively communicates needs to staff.       Problem: Pain Management  Goal: Pain level will decrease to patient's comfort goal  Outcome: PROGRESSING AS EXPECTED  Note: Denies pain at this time.

## 2020-07-16 LAB
BACTERIA BLD CULT: NORMAL
BACTERIA BLD CULT: NORMAL
SIGNIFICANT IND 70042: NORMAL
SIGNIFICANT IND 70042: NORMAL
SITE SITE: NORMAL
SITE SITE: NORMAL
SOURCE SOURCE: NORMAL
SOURCE SOURCE: NORMAL

## 2020-07-28 ENCOUNTER — HOSPITAL ENCOUNTER (OUTPATIENT)
Dept: RADIOLOGY | Facility: MEDICAL CENTER | Age: 50
End: 2020-07-28
Attending: INTERNAL MEDICINE
Payer: COMMERCIAL

## 2020-07-28 DIAGNOSIS — C50.812 MALIGNANT NEOPLASM OF OVERLAPPING SITES OF LEFT FEMALE BREAST, UNSPECIFIED ESTROGEN RECEPTOR STATUS (HCC): ICD-10-CM

## 2020-07-28 PROCEDURE — 700117 HCHG RX CONTRAST REV CODE 255: Performed by: INTERNAL MEDICINE

## 2020-07-28 PROCEDURE — 71260 CT THORAX DX C+: CPT

## 2020-07-28 RX ADMIN — IOHEXOL 75 ML: 350 INJECTION, SOLUTION INTRAVENOUS at 14:59

## 2020-07-29 ENCOUNTER — HOSPITAL ENCOUNTER (OUTPATIENT)
Dept: LAB | Facility: MEDICAL CENTER | Age: 50
End: 2020-07-29
Attending: INTERNAL MEDICINE
Payer: COMMERCIAL

## 2020-07-29 LAB
25(OH)D3 SERPL-MCNC: 36 NG/ML (ref 30–100)
ALBUMIN SERPL BCP-MCNC: 4.5 G/DL (ref 3.2–4.9)
ALBUMIN/GLOB SERPL: 2 G/DL
ALP SERPL-CCNC: 78 U/L (ref 30–99)
ALT SERPL-CCNC: 17 U/L (ref 2–50)
ANION GAP SERPL CALC-SCNC: 15 MMOL/L (ref 7–16)
AST SERPL-CCNC: 19 U/L (ref 12–45)
BILIRUB SERPL-MCNC: 0.4 MG/DL (ref 0.1–1.5)
BUN SERPL-MCNC: 17 MG/DL (ref 8–22)
CALCIUM SERPL-MCNC: 9.7 MG/DL (ref 8.5–10.5)
CHLORIDE SERPL-SCNC: 102 MMOL/L (ref 96–112)
CO2 SERPL-SCNC: 24 MMOL/L (ref 20–33)
CREAT SERPL-MCNC: 0.67 MG/DL (ref 0.5–1.4)
GLOBULIN SER CALC-MCNC: 2.2 G/DL (ref 1.9–3.5)
GLUCOSE SERPL-MCNC: 95 MG/DL (ref 65–99)
MAGNESIUM SERPL-MCNC: 1.7 MG/DL (ref 1.5–2.5)
POTASSIUM SERPL-SCNC: 4 MMOL/L (ref 3.6–5.5)
PROT SERPL-MCNC: 6.7 G/DL (ref 6–8.2)
SODIUM SERPL-SCNC: 141 MMOL/L (ref 135–145)

## 2020-07-29 PROCEDURE — 82306 VITAMIN D 25 HYDROXY: CPT

## 2020-07-29 PROCEDURE — 80053 COMPREHEN METABOLIC PANEL: CPT

## 2020-07-29 PROCEDURE — 83735 ASSAY OF MAGNESIUM: CPT

## 2020-07-30 ENCOUNTER — HOSPITAL ENCOUNTER (OUTPATIENT)
Dept: RADIATION ONCOLOGY | Facility: MEDICAL CENTER | Age: 50
End: 2020-07-31
Attending: RADIOLOGY
Payer: COMMERCIAL

## 2020-07-30 VITALS
OXYGEN SATURATION: 95 % | HEIGHT: 65 IN | TEMPERATURE: 97.3 F | DIASTOLIC BLOOD PRESSURE: 60 MMHG | RESPIRATION RATE: 18 BRPM | BODY MASS INDEX: 38.92 KG/M2 | SYSTOLIC BLOOD PRESSURE: 118 MMHG | HEART RATE: 78 BPM

## 2020-07-30 PROCEDURE — 99214 OFFICE O/P EST MOD 30 MIN: CPT | Performed by: RADIOLOGY

## 2020-07-30 PROCEDURE — 99205 OFFICE O/P NEW HI 60 MIN: CPT | Performed by: RADIOLOGY

## 2020-07-30 ASSESSMENT — PAIN SCALES - GENERAL
PAINLEVEL: NO PAIN
PAINLEVEL: NO PAIN

## 2020-07-30 NOTE — CONSULTS
RADIATION ONCOLOGY CONSULT    DATE OF SERVICE: 7/30/2020    IDENTIFICATION:  Stage IB ([id number]) G3 invasive ductal carcinoma of the left upper outer quadrant of breast ER SC positive HER-2/raymond positive with a Ki-67 of 20% receiving neoadjuvant TCH plus P followed by a lumpectomy and axillary lymph node dissection [id number]+ planning on tamoxifen and TDM 1 inhibitor    HISTORY OF PRESENT ILLNESS: I had the pleasure of seeing Ms. Mendes today in consultation at the request of Dr. Najera for her breast cancer.  Patient is a 50-year-old woman who appreciated an enlarging mass in her breast.  Mammogram showed a spiculated mass in the left upper outer quadrant in this location with associated microcalcifications.  Ultrasound showed a least a 2.3 cm hypoechoic lesion as well as a 3.4 mm lymph node with cortical thickening of concern.  Biopsy of the primary lesion showed an invasive ductal carcinoma with associated high-grade ductal carcinoma in situ.  Tumor was grade 3 in nature with lymphovascular space invasion.  Tumor was ER SC positive HER-2/raymond positive with a Ki-67 of 20%.  The lymph node was biopsied and was positive for breast cancer.  Patient was treated with neoadjuvant TCH and P.  She was taken for lumpectomy and sentinel lymph node biopsy on June 11, 2020.  This showed treatment-related effects but still a 3.3 cm invasive ductal carcinoma.  All surgical margins were clear.  She had 1 of 2 sentinel lymph nodes with isolated tumor cells without extracapsular extension.  She had a residual cancer burden class II.  Therefore in the adjuvant setting she is planning on a TDM 1 inhibitor with tamoxifen.  She presents to me today for further discussion of radiation as a part of breast conservation management and lymph node positive breast disease.    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   • Heart burn    • Lumbar radiculitis 2012   • Malig neoplasm of upper-outer quadrant of unsp female breast (HCC) 2019   • Obesity (BMI 30-39.9) 2013   • S/P laparoscopic cholecystectomy 10/9/14       PAST SURGICAL HISTORY:  Past Surgical History:   Procedure Laterality Date   • PB REDUCTION OF LARGE BREAST Right 2020    Procedure: MAMMOPLASTY, REDUCTION- FOR SYMMETRY;  Surgeon: Dave Chung M.D.;  Location: SURGERY [facility];  Service: Plastics   • PARTIAL MASTECTOMY Left 2020    Procedure: MASTECTOMY, PARTIAL-DELL LOCALIZED;  Surgeon: Yaima Najera M.D.;  Location: SURGERY [facility];  Service: General   • NODE BIOPSY SENTINEL Left 2020    Procedure: BIOPSY, LYMPH NODE, SENTINEL;  Surgeon: Yaima Najera M.D.;  Location: [facility];  Service: General   • LYMPH NODE EXCISION Left 2020    Procedure: EXCISION, LYMPH NODE-DELL LOCALIZED AXILLARY;  Surgeon: Yaima Najera M.D.;  Location: SURGERY [facility];  Service: General   • BREAST RECONSTRUCTION Left 2020    Procedure: RECONSTRUCTION, BREAST- WITH LOCAL TISSUE REARRANGEMENT;  Surgeon: Dave Chung M.D.;  Location: [facility];  Service: Plastics   • AXILLARY NODE DISSECTION Left 2020    Procedure: LYMPHADENECTOMY, AXILLARY  LYMPH NODE DISSECTION;  Surgeon: Yaima Najera M.D.;  Location: SURGERY [facility];  Service: General   • CATH PLACEMENT Right 2020    Procedure: INSERTION, CATHETER - PORT;  Surgeon: Yaima Najera M.D.;  Location: SURGERY SAME DAY [facility];  Service: General   • TRICIA BY LAPAROSCOPY  10/9/2014    Performed by Mal Brantley D.O. at [facility]   • BREAST BIOPSY      benign left bx in        GYNECOLOGICAL STATUS:  : 3 and Para: 1    HORMONE USE:  Contraceptive hormone use 0 years and Other  hormone use 0 years    CURRENT MEDICATIONS:  Current Outpatient Medications   Medication Sig Dispense Refill   • citalopram (CELEXA) 20 MG Tab Take 30 mg by mouth every day.     • loperamide (IMODIUM) 2 MG Cap Take 2 mg by mouth 4 times a day as needed for Diarrhea.     • gabapentin (NEURONTIN) 300 MG Cap Take 400 mg by mouth 2 Times a Day.     • prochlorperazine (COMPAZINE) 10 MG Tab Take 10 mg by mouth every 6 hours as needed for Nausea/Vomiting.     • ondansetron (ZOFRAN) 4 MG Tab tablet Take 4 mg by mouth every 8 hours as needed for Nausea/Vomiting.     • diphenoxylate-atropine (LOMOTIL) 2.5-0.025 MG Tab Take 1 Tab by mouth 3 times a day as needed for Diarrhea.     • CALCIUM PO Take 1 Dose by mouth every morning. Unknown OTC Strength.     • Magnesium 500 MG Cap Take 500 mg by mouth every morning.     • VITAMIN D PO Take 1 Dose by mouth every morning. Unknown OTC Strength.     • POTASSIUM PO Take 1 Dose by mouth every morning. Unknown OTC Strength.     • lidocaine-prilocaine (EMLA) 2.5-2.5 % Cream Apply 1 g to affected area(s) 1 time daily as needed (Port Access).       No current facility-administered medications for this encounter.        ALLERGIES:    Patient has no known allergies.    FAMILY HISTORY:    Family History   Problem Relation Age of Onset   • Hypertension Father    • Cancer Maternal Grandmother 50        breast cancer, cervical cancer and lung cancer   • Breast Cancer Maternal Grandmother    • Cancer Paternal Grandmother 60        breast cancer   • Breast Cancer Paternal Grandmother    • Diabetes Paternal Grandmother    • Osteoporosis Paternal Grandmother        SOCIAL HISTORY:    Social History     Tobacco Use   • Smoking status: Never Smoker   • Smokeless tobacco: Former User     Types: Chew   • Tobacco comment: unable to quantify chewing   Substance Use Topics   • Alcohol use: No     Alcohol/week: 0.0 oz   • Drug use: Yes     Types: Inhaled, Oral, Marijuana     Comment: marijuana daily - last  "used 6/10/20     Patient is working as a  , currently on medical leave  [place] in [place]      REVIEW OF SYSTEMS:  A complete review of systems was completed in patient's chart on 7/30/2020.  All are negative with relationship to this diagnosis with the exception of:  Patient more recently had a breast infection requiring IV antibiotics and oral antibiotics.  Today she will complete her oral antibiotics.  She has not seen any evidence of return of the infection.  She denies any suspicious lesions in the breast or axilla.  She denies any areas of bony tenderness or pain, she denies headache or neurologic symptoms.    PHYSICAL EXAM:    Vitals:    07/30/20 1012   BP: 118/60   Pulse: 78   Resp: 18   Temp: 36.3 °C (97.3 °F)   TempSrc: Temporal   SpO2: 95%   Height: 1.651 m (5' 5\")   Pain Score: No pain      0= Fully active, able to carry on all pre-disease performance without restriction.        GENERAL: No apparent distress.  HEENT:  Pupils are equal, round, and reactive to light.  Extraocular muscles   are intact. Sclerae nonicteric.  Conjunctivae pink.  Oral cavity, tongue   protrudes midline.   NECK:  Supple without evidence of thyromegaly.  NODES:  No peripheral adenopathy of the neck, supraclavicular fossa or axillae   bilaterally.  LUNGS:  Clear to ascultation bilaterally   HEART:  Regular rate and rhythm.  No murmur appreciated  BREAST: No suspicious lesions found in either breast or axilla.  ABDOMEN:  Soft. No evidence of hepatosplenomegaly.  Positive bowel sounds.  EXTREMITIES:  Without Edema.  NEUROLOGIC:  Cranial nerves II through XII were intact. Normal stance and gait motor and sensory grossly within normal limits    IMPRESSION:    Stage IB ([id number]) G3 invasive ductal carcinoma of the left upper outer quadrant of breast ER CT positive HER-2/raymond positive with a Ki-67 of 20% receiving neoadjuvant TCH plus P followed by a lumpectomy and axillary lymph node dissection [id number] " planning on tamoxifen and TDM 1 inhibitor    RECOMMENDATIONS:   I discussed the diagnosis, prognosis, and treatment options over a 1 hr 5 min time period, 95% of that time dedicated to ongoing treatment management.  I discussed with the patient and her friend are both pre-therapy staging and post therapy pathologic staging.  We distinguish the role of systemic chemotherapy as opposed to local surgery and radiation.  We went over the data for radiation in both breast conservation management as well as lymph node positive disease.  Given her presenting features, HER-2 status, and response to neoadjuvant therapy I would plan on comprehensive breast radiotherapy to the breast and lymphatics.  Anticipate 28 fractions of external beam radiotherapy to 5040 cGy without a boost.  Patient was given a simulation for next week which will be 1 week off oral antibiotics.      We discussed the risks, benefits and side effects of treatment and the patient is amenable to treatment.  If patient has any questions or concerns, she should feel free to contact me.    Thank you for the opportunity to participate in her care.  If any questions or comments, please do not hesitate in calling.

## 2020-07-30 NOTE — NON-PROVIDER
"Patient was seen today in clinic with Dr. Kim for consult.  Vitals signs and weight were obtained and pain assessment was completed.  Allergies and medications were reviewed with the patient.  Review of systems completed.     Vitals/Pain:  Vitals:    07/30/20 1012   BP: 118/60   Pulse: 78   Resp: 18   Temp: 36.3 °C (97.3 °F)   TempSrc: Temporal   SpO2: 95%   Height: 1.651 m (5' 5\")   Pain Score: No pain        Allergies:   Patient has no known allergies.    Current Medications:  Current Outpatient Medications   Medication Sig Dispense Refill   • citalopram (CELEXA) 20 MG Tab Take 30 mg by mouth every day.     • loperamide (IMODIUM) 2 MG Cap Take 2 mg by mouth 4 times a day as needed for Diarrhea.     • gabapentin (NEURONTIN) 300 MG Cap Take 400 mg by mouth 2 Times a Day.     • prochlorperazine (COMPAZINE) 10 MG Tab Take 10 mg by mouth every 6 hours as needed for Nausea/Vomiting.     • ondansetron (ZOFRAN) 4 MG Tab tablet Take 4 mg by mouth every 8 hours as needed for Nausea/Vomiting.     • diphenoxylate-atropine (LOMOTIL) 2.5-0.025 MG Tab Take 1 Tab by mouth 3 times a day as needed for Diarrhea.     • CALCIUM PO Take 1 Dose by mouth every morning. Unknown OTC Strength.     • Magnesium 500 MG Cap Take 500 mg by mouth every morning.     • VITAMIN D PO Take 1 Dose by mouth every morning. Unknown OTC Strength.     • POTASSIUM PO Take 1 Dose by mouth every morning. Unknown OTC Strength.     • lidocaine-prilocaine (EMLA) 2.5-2.5 % Cream Apply 1 g to affected area(s) 1 time daily as needed (Port Access).       No current facility-administered medications for this encounter.          PCP:  Fogel Corinne K. Pace, R.N.  "

## 2020-07-31 ENCOUNTER — HOSPITAL ENCOUNTER (OUTPATIENT)
Dept: CARDIOLOGY | Facility: MEDICAL CENTER | Age: 50
End: 2020-07-31
Attending: INTERNAL MEDICINE
Payer: COMMERCIAL

## 2020-07-31 DIAGNOSIS — C50.812 MALIGNANT NEOPLASM OF OVERLAPPING SITES OF LEFT FEMALE BREAST, UNSPECIFIED ESTROGEN RECEPTOR STATUS (HCC): ICD-10-CM

## 2020-07-31 LAB
LV EJECT FRACT  99904: 60
LV EJECT FRACT MOD 2C 99903: 60
LV EJECT FRACT MOD 4C 99902: 63.7
LV EJECT FRACT MOD BP 99901: 59.95

## 2020-07-31 PROCEDURE — 93308 TTE F-UP OR LMTD: CPT

## 2020-07-31 PROCEDURE — 93308 TTE F-UP OR LMTD: CPT | Mod: 26 | Performed by: INTERNAL MEDICINE

## 2020-08-03 ENCOUNTER — HOSPITAL ENCOUNTER (OUTPATIENT)
Dept: RADIATION ONCOLOGY | Facility: MEDICAL CENTER | Age: 50
End: 2020-08-31
Attending: RADIOLOGY
Payer: COMMERCIAL

## 2020-08-05 ENCOUNTER — HOSPITAL ENCOUNTER (OUTPATIENT)
Dept: RADIATION ONCOLOGY | Facility: MEDICAL CENTER | Age: 50
End: 2020-08-05
Payer: COMMERCIAL

## 2020-08-05 PROCEDURE — 77334 RADIATION TREATMENT AID(S): CPT | Mod: 26 | Performed by: RADIOLOGY

## 2020-08-05 PROCEDURE — 77290 THER RAD SIMULAJ FIELD CPLX: CPT | Mod: 26 | Performed by: RADIOLOGY

## 2020-08-05 PROCEDURE — 77290 THER RAD SIMULAJ FIELD CPLX: CPT | Performed by: RADIOLOGY

## 2020-08-05 PROCEDURE — 77470 SPECIAL RADIATION TREATMENT: CPT | Mod: 26 | Performed by: RADIOLOGY

## 2020-08-05 PROCEDURE — 77334 RADIATION TREATMENT AID(S): CPT | Performed by: RADIOLOGY

## 2020-08-05 PROCEDURE — 77263 THER RADIOLOGY TX PLNG CPLX: CPT | Performed by: RADIOLOGY

## 2020-08-10 PROCEDURE — 77300 RADIATION THERAPY DOSE PLAN: CPT | Mod: 26 | Performed by: RADIOLOGY

## 2020-08-10 PROCEDURE — 77300 RADIATION THERAPY DOSE PLAN: CPT | Performed by: RADIOLOGY

## 2020-08-10 PROCEDURE — 77334 RADIATION TREATMENT AID(S): CPT | Performed by: RADIOLOGY

## 2020-08-10 PROCEDURE — 77334 RADIATION TREATMENT AID(S): CPT | Mod: 26 | Performed by: RADIOLOGY

## 2020-08-10 PROCEDURE — 77295 3-D RADIOTHERAPY PLAN: CPT | Mod: 26 | Performed by: RADIOLOGY

## 2020-08-10 PROCEDURE — 77295 3-D RADIOTHERAPY PLAN: CPT | Performed by: RADIOLOGY

## 2020-08-12 ENCOUNTER — HOSPITAL ENCOUNTER (OUTPATIENT)
Dept: RADIATION ONCOLOGY | Facility: MEDICAL CENTER | Age: 50
End: 2020-08-12
Payer: COMMERCIAL

## 2020-08-12 VITALS — TEMPERATURE: 98.1 F | HEART RATE: 80 BPM | SYSTOLIC BLOOD PRESSURE: 121 MMHG | DIASTOLIC BLOOD PRESSURE: 62 MMHG

## 2020-08-12 LAB

## 2020-08-12 PROCEDURE — 77280 THER RAD SIMULAJ FIELD SMPL: CPT | Mod: 26 | Performed by: RADIOLOGY

## 2020-08-12 PROCEDURE — 77280 THER RAD SIMULAJ FIELD SMPL: CPT | Performed by: RADIOLOGY

## 2020-08-12 PROCEDURE — 77412 RADIATION TX DELIVERY LVL 3: CPT | Performed by: RADIOLOGY

## 2020-08-12 ASSESSMENT — PAIN SCALES - GENERAL
PAINLEVEL: NO PAIN
PAINLEVEL: NO PAIN

## 2020-08-12 NOTE — ON TREATMENT VISIT
ON TREATMENT NOTE  RADIATION ONCOLOGY DEPARTMENT    Patient name:  Lori Mendes    Primary Physician:  Renetta Lozoya P.A.-C. MRN: [id number]  [facility]: [id number]   Referring physician:  Yaima Najera M.D. : 1970, 50 y.o.     ENCOUNTER DATE:  20    DIAGNOSIS:    Malignant neoplasm of left breast in female, estrogen receptor positive (HCC)  Staging form: Breast, AJCC 8th Edition  - Pathologic: No Stage Recommended (ypT2, pN0(i+), cM0, G3, ER+, WI+, HER2+) - Signed by Sylvain Kim M.D. on 2020  Stage prefix: Post-therapy  Histologic grading system: 3 grade system      TREATMENT SUMMARY:  [facility] Treatment Information        Some values may be hidden. Unless noted otherwise, only the newest values recorded on each date are displayed.         [facility] Treatment Summary 20   Course First Treatment Date 2020   Course Last Treatment Date 2020   L_Breast Plan from Course C1_LBreast   Fraction 1 of 28   Elapsed Course Days 0 @    Prescribed Fraction Dose 180 cGy   Prescribed Total Dose 5,040 cGy   L_Supraclav Plan from Course C1_LBreast   Fraction 1 of 28   Elapsed Course Days 0 @    Prescribed Fraction Dose 180 cGy   Prescribed Total Dose 5,040 cGy   Breast DPV Reference Point from Course C1_LBreast   Elapsed Course Days 0 @    Session Dose 180 cGy   Total Dose 180 cGy   L breast cp Reference Point from Course C1_LBreast   Elapsed Course Days 0 @    Session Dose 180 cGy   Total Dose 180 cGy   L sc cp Reference Point from Course C1_LBreast   Elapsed Course Days 0 @    Session Dose 180 cGy   Total Dose 180 cGy   SC DPV Reference Point from Course C1_LBreast   Elapsed Course Days 0 @    Session Dose 180 cGy   Total Dose 180 cGy              SUBJECTIVE:   Patient tolerated her first day well.  She does not have any questions or concerns regarding her radiation.    VITAL SIGNS:    Encounter Vitals  Temperature:  36.7 °C (98.1 °F)  Blood Pressure: 121/62  Pulse: 80  Pain Score: No pain  Pain Assessment 8/12/2020 8/12/2020 7/30/2020 7/30/2020   Pain Assessment Denies Pain - Denies Pain -   Pain Score 0 0 0 0   Some recent data might be hidden          PHYSICAL EXAM:  No erythema    TOXICITY  Toxicity Assessment 8/12/2020   Toxicity Assessment Breast   Fatigue (lethargy, malaise, asthenia) None   Fever (in the absence of neutropenia) None   Radiation Dermatitis None   Lymphatics Normal   RT - Pain due to RT None   Dyspnea Normal         IMPRESSION:  Cancer Staging  Malignant neoplasm of left breast in female, estrogen receptor positive (HCC)  Staging form: Breast, AJCC 8th Edition  - Clinical: Stage IB (cT2, cN1, cM0, G3, ER+, WV+, HER2+) - Unsigned  - Pathologic: No Stage Recommended (ypT2, pN0(i+), cM0, G3, ER+, WV+, HER2+) - Signed by Sylvain Kim M.D. on 7/2/2020      PLAN:  No change in treatment plan    Disposition:  Treatment plan reviewed. Questions answered. Continue therapy outlined.     Sylvain Kim M.D.    No orders of the defined types were placed in this encounter.

## 2020-08-13 ENCOUNTER — HOSPITAL ENCOUNTER (OUTPATIENT)
Dept: RADIATION ONCOLOGY | Facility: MEDICAL CENTER | Age: 50
End: 2020-08-13
Payer: COMMERCIAL

## 2020-08-13 LAB

## 2020-08-13 PROCEDURE — 77412 RADIATION TX DELIVERY LVL 3: CPT | Performed by: RADIOLOGY

## 2020-08-14 ENCOUNTER — HOSPITAL ENCOUNTER (OUTPATIENT)
Dept: RADIATION ONCOLOGY | Facility: MEDICAL CENTER | Age: 50
End: 2020-08-14
Payer: COMMERCIAL

## 2020-08-14 LAB

## 2020-08-14 PROCEDURE — 77412 RADIATION TX DELIVERY LVL 3: CPT | Performed by: RADIOLOGY

## 2020-08-14 PROCEDURE — 77336 RADIATION PHYSICS CONSULT: CPT | Performed by: RADIOLOGY

## 2020-08-17 ENCOUNTER — HOSPITAL ENCOUNTER (OUTPATIENT)
Dept: RADIATION ONCOLOGY | Facility: MEDICAL CENTER | Age: 50
End: 2020-08-17
Payer: COMMERCIAL

## 2020-08-17 LAB

## 2020-08-17 PROCEDURE — 77412 RADIATION TX DELIVERY LVL 3: CPT | Performed by: RADIOLOGY

## 2020-08-18 ENCOUNTER — HOSPITAL ENCOUNTER (OUTPATIENT)
Dept: RADIATION ONCOLOGY | Facility: MEDICAL CENTER | Age: 50
End: 2020-08-18
Payer: COMMERCIAL

## 2020-08-18 LAB

## 2020-08-18 PROCEDURE — 77412 RADIATION TX DELIVERY LVL 3: CPT | Performed by: RADIOLOGY

## 2020-08-18 PROCEDURE — 77427 RADIATION TX MANAGEMENT X5: CPT | Performed by: RADIOLOGY

## 2020-08-19 ENCOUNTER — HOSPITAL ENCOUNTER (OUTPATIENT)
Dept: RADIATION ONCOLOGY | Facility: MEDICAL CENTER | Age: 50
End: 2020-08-19
Payer: COMMERCIAL

## 2020-08-19 LAB

## 2020-08-19 PROCEDURE — 77412 RADIATION TX DELIVERY LVL 3: CPT | Performed by: RADIOLOGY

## 2020-08-19 PROCEDURE — 77417 THER RADIOLOGY PORT IMAGE(S): CPT | Performed by: RADIOLOGY

## 2020-08-19 NOTE — ON TREATMENT VISIT
ON TREATMENT NOTE  RADIATION ONCOLOGY DEPARTMENT    Patient name:  Lori Mendes    Primary Physician:  Renetta Lozoya P.A.-C. MRN: [id number]  [facility]: [id number]   Referring physician:  Yaima Najera M.D. : 1970, 50 y.o.     ENCOUNTER DATE:  20    DIAGNOSIS:    Malignant neoplasm of left breast in female, estrogen receptor positive (HCC)  Staging form: Breast, AJCC 8th Edition  - Pathologic: No Stage Recommended (ypT2, pN0(i+), cM0, G3, ER+, VT+, HER2+) - Signed by Sylvain Kim M.D. on 2020  Stage prefix: Post-therapy  Histologic grading system: 3 grade system      TREATMENT SUMMARY:  Aria Treatment Information        Some values may be hidden. Unless noted otherwise, only the newest values recorded on each date are displayed.         Aria Treatment Summary 20   Course First Treatment Date 2020   Course Last Treatment Date 2020   L_Breast Plan from Course C1_LBreast   Fraction  of 28   Elapsed Course Days 7 @    Prescribed Fraction Dose 180 cGy   Prescribed Total Dose 5,040 cGy   L_Supraclav Plan from Course C1_LBreast   Fraction    Elapsed Course Days  @ [id number]   Prescribed Fraction Dose 180 cGy   Prescribed Total Dose 5,040 cGy   Breast DPV Reference Point from Course C1_LBreast   Elapsed Course Days  @ [id number]   Session Dose 180 cGy   Total Dose 1,080 cGy   L breast cp Reference Point from Course C1_LBreast   Elapsed Course Days  @ [id number]   Session Dose 180 cGy   Total Dose 1,080 cGy   L sc cp Reference Point from Course C1_LBreast   Elapsed Course Days  @ [id number]   Session Dose 180 cGy   Total Dose 1,080 cGy   SC DPV Reference Point from Course C1_LBreast   Elapsed Course Days  @ [id number]   Session Dose 180 cGy   Total Dose 1,080 cGy              SUBJECTIVE:   Patient is doing well.  She does not have any changes she would attribute to her radiation.    VITAL SIGNS:       Pain Assessment 2020  7/30/2020 7/30/2020   Pain Assessment Denies Pain - Denies Pain -   Pain Score 0 0 0 0   Some recent data might be hidden          PHYSICAL EXAM:  No erythema    TOXICITY  Toxicity Assessment 8/12/2020   Toxicity Assessment Breast   Fatigue (lethargy, malaise, asthenia) None   Fever (in the absence of neutropenia) None   Radiation Dermatitis None   Lymphatics Normal   RT - Pain due to RT None   Dyspnea Normal         IMPRESSION:  Cancer Staging  Malignant neoplasm of left breast in female, estrogen receptor positive (HCC)  Staging form: Breast, AJCC 8th Edition  - Clinical: Stage IB (cT2, cN1, cM0, G3, ER+, OR+, HER2+) - Unsigned  - Pathologic: No Stage Recommended (ypT2, pN0(i+), cM0, G3, ER+, OR+, HER2+) - Signed by Sylvain Kim M.D. on 7/2/2020      PLAN:  No change in treatment plan    Disposition:  Treatment plan reviewed. Questions answered. Continue therapy outlined.     Sylvain Kim M.D.    No orders of the defined types were placed in this encounter.

## 2020-08-20 ENCOUNTER — HOSPITAL ENCOUNTER (OUTPATIENT)
Dept: RADIATION ONCOLOGY | Facility: MEDICAL CENTER | Age: 50
End: 2020-08-20
Payer: COMMERCIAL

## 2020-08-20 LAB

## 2020-08-20 PROCEDURE — 77412 RADIATION TX DELIVERY LVL 3: CPT | Performed by: RADIOLOGY

## 2020-08-21 ENCOUNTER — HOSPITAL ENCOUNTER (OUTPATIENT)
Dept: RADIATION ONCOLOGY | Facility: MEDICAL CENTER | Age: 50
End: 2020-08-21
Payer: COMMERCIAL

## 2020-08-21 LAB

## 2020-08-21 PROCEDURE — 77412 RADIATION TX DELIVERY LVL 3: CPT | Performed by: RADIOLOGY

## 2020-08-21 PROCEDURE — 77336 RADIATION PHYSICS CONSULT: CPT | Performed by: RADIOLOGY

## 2020-08-24 ENCOUNTER — HOSPITAL ENCOUNTER (OUTPATIENT)
Dept: RADIATION ONCOLOGY | Facility: MEDICAL CENTER | Age: 50
End: 2020-08-24
Payer: COMMERCIAL

## 2020-08-24 LAB

## 2020-08-24 PROCEDURE — 77412 RADIATION TX DELIVERY LVL 3: CPT | Performed by: RADIOLOGY

## 2020-08-25 ENCOUNTER — HOSPITAL ENCOUNTER (OUTPATIENT)
Dept: RADIATION ONCOLOGY | Facility: MEDICAL CENTER | Age: 50
End: 2020-08-25
Payer: COMMERCIAL

## 2020-08-25 LAB

## 2020-08-25 PROCEDURE — 77412 RADIATION TX DELIVERY LVL 3: CPT | Performed by: RADIOLOGY

## 2020-08-25 PROCEDURE — 77427 RADIATION TX MANAGEMENT X5: CPT | Performed by: RADIOLOGY

## 2020-08-26 ENCOUNTER — HOSPITAL ENCOUNTER (OUTPATIENT)
Dept: RADIATION ONCOLOGY | Facility: MEDICAL CENTER | Age: 50
End: 2020-08-26
Payer: COMMERCIAL

## 2020-08-26 VITALS
TEMPERATURE: 97.6 F | SYSTOLIC BLOOD PRESSURE: 107 MMHG | OXYGEN SATURATION: 96 % | HEART RATE: 74 BPM | DIASTOLIC BLOOD PRESSURE: 52 MMHG

## 2020-08-26 LAB

## 2020-08-26 PROCEDURE — 77412 RADIATION TX DELIVERY LVL 3: CPT | Performed by: RADIOLOGY

## 2020-08-26 PROCEDURE — 77417 THER RADIOLOGY PORT IMAGE(S): CPT | Performed by: RADIOLOGY

## 2020-08-26 ASSESSMENT — PAIN SCALES - GENERAL: PAINLEVEL: NO PAIN

## 2020-08-26 NOTE — ON TREATMENT VISIT
ON TREATMENT NOTE  RADIATION ONCOLOGY DEPARTMENT    Patient name:  Lori Mendes    Primary Physician:  Renetta Lozoya P.A.-C. MRN: [id number]  [facility]: [id number]   Referring physician:  Yaima Najera M.D. : 1970, 50 y.o.     ENCOUNTER DATE:  20    DIAGNOSIS:    Malignant neoplasm of left breast in female, estrogen receptor positive (HCC)  Staging form: Breast, AJCC 8th Edition  - Pathologic: No Stage Recommended (ypT2, pN0(i+), cM0, G3, ER+, [place]+, HER2+) - Signed by Sylvain Kim M.D. on 2020  Stage prefix: Post-therapy  Histologic grading system: 3 grade system      TREATMENT SUMMARY:  Aria Treatment Information        Some values may be hidden. Unless noted otherwise, only the newest values recorded on each date are displayed.         Aria Treatment Summary 20   Course First Treatment Date 2020   Course Last Treatment Date 2020   L_Breast Plan from Course C1_LBreast   Fraction    Elapsed Course Days  @    Prescribed Fraction Dose 180 cGy   Prescribed Total Dose 5,040 cGy   L_Supraclav Plan from Course C1_LBreast   Fraction    Elapsed Course Days [id number]   Prescribed Fraction Dose 180 cGy   Prescribed Total Dose 5,040 cGy   Breast DPV Reference Point from Course C1_LBreast   Elapsed Course Days    Session Dose 180 cGy   Total Dose 1,980 cGy   L breast cp Reference Point from Course C1_LBreast   Elapsed Course Days    Session Dose 180 cGy   Total Dose 1,980 cGy   L sc cp Reference Point from Course C1_LBreast   Elapsed Course Days    Session Dose 180 cGy   Total Dose 1,980 cGy   SC DPV Reference Point from Course C1_LBreast   Elapsed Course Days    Session Dose 180 cGy   Total Dose 1,980 cGy              SUBJECTIVE:   Patient is doing well.  She does not have any changes she would attribute to her radiation.    VITAL SIGNS:    Encounter Vitals  Temperature:  36.4 °C (97.6 °F)  Blood Pressure: 107/52  Pulse: 74  Pulse Oximetry: 96 %  Pain Score: No pain  Pain Assessment 8/26/2020 8/12/2020 8/12/2020 7/30/2020 7/30/2020   Pain Assessment - Denies Pain - Denies Pain -   Pain Score 0 0 0 0 0   Some recent data might be hidden          PHYSICAL EXAM:  No erythema    TOXICITY  Toxicity Assessment 8/26/2020 8/12/2020   Toxicity Assessment Breast Breast   Fatigue (lethargy, malaise, asthenia) None None   Fever (in the absence of neutropenia) None None   Radiation Dermatitis None None   Lymphatics Normal Normal   RT - Pain due to RT Mild pain not interfering with function None   Dyspnea Normal Normal         IMPRESSION:  Cancer Staging  Malignant neoplasm of left breast in female, estrogen receptor positive (HCC)  Staging form: Breast, AJCC 8th Edition  - Clinical: Stage IB (cT2, cN1, cM0, G3, ER+, TN+, HER2+) - Unsigned  - Pathologic: No Stage Recommended (ypT2, pN0(i+), cM0, G3, ER+, TN+, HER2+) - Signed by Sylvain Kim M.D. on 7/2/2020      PLAN:  No change in treatment plan    Disposition:  Treatment plan reviewed. Questions answered. Continue therapy outlined.     Sylvain Kim M.D.    No orders of the defined types were placed in this encounter.

## 2020-08-27 ENCOUNTER — HOSPITAL ENCOUNTER (OUTPATIENT)
Dept: RADIATION ONCOLOGY | Facility: MEDICAL CENTER | Age: 50
End: 2020-08-27
Payer: COMMERCIAL

## 2020-08-27 LAB

## 2020-08-27 PROCEDURE — 77412 RADIATION TX DELIVERY LVL 3: CPT | Performed by: RADIOLOGY

## 2020-08-28 ENCOUNTER — HOSPITAL ENCOUNTER (OUTPATIENT)
Dept: RADIATION ONCOLOGY | Facility: MEDICAL CENTER | Age: 50
End: 2020-08-28
Payer: COMMERCIAL

## 2020-08-28 LAB

## 2020-08-28 PROCEDURE — 77412 RADIATION TX DELIVERY LVL 3: CPT | Performed by: RADIOLOGY

## 2020-08-28 PROCEDURE — 77336 RADIATION PHYSICS CONSULT: CPT | Performed by: RADIOLOGY

## 2020-08-31 ENCOUNTER — HOSPITAL ENCOUNTER (OUTPATIENT)
Dept: RADIATION ONCOLOGY | Facility: MEDICAL CENTER | Age: 50
End: 2020-08-31
Payer: COMMERCIAL

## 2020-08-31 LAB

## 2020-08-31 PROCEDURE — 77412 RADIATION TX DELIVERY LVL 3: CPT | Performed by: RADIOLOGY

## 2020-09-01 ENCOUNTER — HOSPITAL ENCOUNTER (OUTPATIENT)
Dept: RADIATION ONCOLOGY | Facility: MEDICAL CENTER | Age: 50
End: 2020-09-30
Attending: RADIOLOGY
Payer: COMMERCIAL

## 2020-09-01 ENCOUNTER — HOSPITAL ENCOUNTER (OUTPATIENT)
Dept: RADIATION ONCOLOGY | Facility: MEDICAL CENTER | Age: 50
End: 2020-09-01
Payer: COMMERCIAL

## 2020-09-01 LAB

## 2020-09-01 PROCEDURE — 77427 RADIATION TX MANAGEMENT X5: CPT | Performed by: RADIOLOGY

## 2020-09-01 PROCEDURE — 77412 RADIATION TX DELIVERY LVL 3: CPT | Performed by: RADIOLOGY

## 2020-09-02 ENCOUNTER — HOSPITAL ENCOUNTER (OUTPATIENT)
Dept: RADIATION ONCOLOGY | Facility: MEDICAL CENTER | Age: 50
End: 2020-09-02
Payer: COMMERCIAL

## 2020-09-02 LAB

## 2020-09-02 PROCEDURE — 77417 THER RADIOLOGY PORT IMAGE(S): CPT | Performed by: RADIOLOGY

## 2020-09-02 PROCEDURE — 77412 RADIATION TX DELIVERY LVL 3: CPT | Performed by: RADIOLOGY

## 2020-09-02 NOTE — ON TREATMENT VISIT
ON TREATMENT NOTE  RADIATION ONCOLOGY DEPARTMENT    Patient name:  Lori Mendes    Primary Physician:  Renetta Lozoya P.A.-C. MRN: [id number]  [facility]: [id number]   Referring physician:  Yaima Najera M.D. : 1970, 50 y.o.     ENCOUNTER DATE:  20    DIAGNOSIS:    Malignant neoplasm of left breast in female, estrogen receptor positive (HCC)  Staging form: Breast, AJCC 8th Edition  - Pathologic: No Stage Recommended (ypT2, pN0(i+), cM0, G3, ER+, [place]+, HER2+) - Signed by Sylvain Kim M.D. on 2020  Stage prefix: Post-therapy  Histologic grading system: 3 grade system      TREATMENT SUMMARY:  Aria Treatment Information        Some values may be hidden. Unless noted otherwise, only the newest values recorded on each date are displayed.         Aria Treatment Summary 20   Course First Treatment Date 2020   Course Last Treatment Date 2020   L_Breast Plan from Course C1_LBreast   Fraction 16 of 28   Elapsed Course Days  @    Prescribed Fraction Dose 180 cGy   Prescribed Total Dose 5,040 cGy   L_Supraclav Plan from Course C1_LBreast   Fraction 16  28   Elapsed Course Days  @    Prescribed Fraction Dose 180 cGy   Prescribed Total Dose 5,040 cGy   Breast DPV Reference Point from Course C1_LBreast   Elapsed Course Days  @    Session Dose 180 cGy   Total Dose 2,880 cGy   L breast cp Reference Point from Course C1_LBreast   Elapsed Course Days    Session Dose 180 cGy   Total Dose 2,880 cGy   L sc cp Reference Point from Course C1_LBreast   Elapsed Course Days  @    Session Dose 180 cGy   Total Dose 2,880 cGy   SC DPV Reference Point from Course C1_LBreast   Elapsed Course Days  @    Session Dose 180 cGy   Total Dose 2,880 cGy              SUBJECTIVE:   Patient is doing well.  She does not have any appreciable changes in the skin yet.  Energy level is good.    VITAL SIGNS:       Pain Assessment  8/26/2020 8/12/2020 8/12/2020 7/30/2020 7/30/2020   Pain Assessment - Denies Pain - Denies Pain -   Pain Score 0 0 0 0 0   Some recent data might be hidden          PHYSICAL EXAM:  No erythema    TOXICITY  Toxicity Assessment 8/26/2020 8/12/2020   Toxicity Assessment Breast Breast   Fatigue (lethargy, malaise, asthenia) None None   Fever (in the absence of neutropenia) None None   Radiation Dermatitis None None   Lymphatics Normal Normal   RT - Pain due to RT Mild pain not interfering with function None   Dyspnea Normal Normal         IMPRESSION:  Cancer Staging  Malignant neoplasm of left breast in female, estrogen receptor positive (HCC)  Staging form: Breast, AJCC 8th Edition  - Clinical: Stage IB (cT2, cN1, cM0, G3, ER+, [place]+, HER2+) - Unsigned  - Pathologic: No Stage Recommended (ypT2, pN0(i+), cM0, G3, ER+, [place]+, HER2+) - Signed by Sylvain Kim M.D. on 7/2/2020      PLAN:  No change in treatment plan    Disposition:  Treatment plan reviewed. Questions answered. Continue therapy outlined.     Sylvain Kim M.D.    No orders of the defined types were placed in this encounter.

## 2020-09-03 ENCOUNTER — HOSPITAL ENCOUNTER (OUTPATIENT)
Dept: RADIATION ONCOLOGY | Facility: MEDICAL CENTER | Age: 50
End: 2020-09-03
Payer: COMMERCIAL

## 2020-09-03 LAB

## 2020-09-03 PROCEDURE — 77412 RADIATION TX DELIVERY LVL 3: CPT | Performed by: RADIOLOGY

## 2020-09-04 ENCOUNTER — HOSPITAL ENCOUNTER (OUTPATIENT)
Dept: RADIATION ONCOLOGY | Facility: MEDICAL CENTER | Age: 50
End: 2020-09-04
Payer: COMMERCIAL

## 2020-09-04 ENCOUNTER — APPOINTMENT (OUTPATIENT)
Dept: MEDICAL GROUP | Facility: MEDICAL CENTER | Age: 50
End: 2020-09-04
Payer: COMMERCIAL

## 2020-09-04 LAB

## 2020-09-04 PROCEDURE — 77336 RADIATION PHYSICS CONSULT: CPT | Performed by: RADIOLOGY

## 2020-09-04 PROCEDURE — 77412 RADIATION TX DELIVERY LVL 3: CPT | Performed by: RADIOLOGY

## 2020-09-08 ENCOUNTER — OFFICE VISIT (OUTPATIENT)
Dept: MEDICAL GROUP | Facility: MEDICAL CENTER | Age: 50
End: 2020-09-08
Payer: COMMERCIAL

## 2020-09-08 ENCOUNTER — HOSPITAL ENCOUNTER (OUTPATIENT)
Dept: RADIATION ONCOLOGY | Facility: MEDICAL CENTER | Age: 50
End: 2020-09-08
Payer: COMMERCIAL

## 2020-09-08 VITALS
TEMPERATURE: 97.5 F | DIASTOLIC BLOOD PRESSURE: 88 MMHG | SYSTOLIC BLOOD PRESSURE: 134 MMHG | OXYGEN SATURATION: 97 % | HEIGHT: 65 IN | WEIGHT: 233.69 LBS | BODY MASS INDEX: 38.93 KG/M2 | HEART RATE: 87 BPM | RESPIRATION RATE: 16 BRPM

## 2020-09-08 DIAGNOSIS — M54.16 LUMBAR RADICULITIS: ICD-10-CM

## 2020-09-08 DIAGNOSIS — F33.0 MILD EPISODE OF RECURRENT MAJOR DEPRESSIVE DISORDER (HCC): ICD-10-CM

## 2020-09-08 DIAGNOSIS — F51.01 PRIMARY INSOMNIA: ICD-10-CM

## 2020-09-08 DIAGNOSIS — C50.912 MALIGNANT NEOPLASM OF LEFT BREAST IN FEMALE, ESTROGEN RECEPTOR POSITIVE, UNSPECIFIED SITE OF BREAST (HCC): ICD-10-CM

## 2020-09-08 DIAGNOSIS — Z17.0 MALIGNANT NEOPLASM OF LEFT BREAST IN FEMALE, ESTROGEN RECEPTOR POSITIVE, UNSPECIFIED SITE OF BREAST (HCC): ICD-10-CM

## 2020-09-08 PROBLEM — N61.1 ABSCESS OF LEFT BREAST: Status: RESOLVED | Noted: 2020-07-11 | Resolved: 2020-09-08

## 2020-09-08 LAB

## 2020-09-08 PROCEDURE — 99203 OFFICE O/P NEW LOW 30 MIN: CPT | Performed by: NURSE PRACTITIONER

## 2020-09-08 PROCEDURE — 77412 RADIATION TX DELIVERY LVL 3: CPT | Performed by: RADIOLOGY

## 2020-09-08 RX ORDER — HYDROXYZINE HYDROCHLORIDE 25 MG/1
25 TABLET, FILM COATED ORAL 3 TIMES DAILY PRN
COMMUNITY
End: 2021-03-29

## 2020-09-08 RX ORDER — GABAPENTIN 300 MG/1
600 CAPSULE ORAL 3 TIMES DAILY
Qty: 180 CAP | Refills: 1 | Status: SHIPPED | OUTPATIENT
Start: 2020-09-08 | End: 2020-11-12 | Stop reason: SDUPTHER

## 2020-09-08 RX ORDER — TRAZODONE HYDROCHLORIDE 50 MG/1
50 TABLET ORAL NIGHTLY
COMMUNITY
End: 2021-02-01 | Stop reason: SDUPTHER

## 2020-09-08 SDOH — SOCIAL STABILITY: SOCIAL NETWORK
DO YOU BELONG TO ANY CLUBS OR ORGANIZATIONS SUCH AS CHURCH GROUPS UNIONS, FRATERNAL OR ATHLETIC GROUPS, OR SCHOOL GROUPS?: NO

## 2020-09-08 SDOH — ECONOMIC STABILITY: FOOD INSECURITY: WITHIN THE PAST 12 MONTHS, YOU WORRIED THAT YOUR FOOD WOULD RUN OUT BEFORE YOU GOT MONEY TO BUY MORE.: NEVER TRUE

## 2020-09-08 SDOH — HEALTH STABILITY: MENTAL HEALTH: HOW OFTEN DO YOU HAVE A DRINK CONTAINING ALCOHOL?: NEVER

## 2020-09-08 SDOH — HEALTH STABILITY: PHYSICAL HEALTH: ON AVERAGE, HOW MANY DAYS PER WEEK DO YOU ENGAGE IN MODERATE TO STRENUOUS EXERCISE (LIKE A BRISK WALK)?: 0 DAYS

## 2020-09-08 SDOH — ECONOMIC STABILITY: FOOD INSECURITY: WITHIN THE PAST 12 MONTHS, THE FOOD YOU BOUGHT JUST DIDN'T LAST AND YOU DIDN'T HAVE MONEY TO GET MORE.: NEVER TRUE

## 2020-09-08 SDOH — HEALTH STABILITY: PHYSICAL HEALTH: ON AVERAGE, HOW MANY MINUTES DO YOU ENGAGE IN EXERCISE AT THIS LEVEL?: 0 MINUTES

## 2020-09-08 SDOH — ECONOMIC STABILITY: INCOME INSECURITY: HOW HARD IS IT FOR YOU TO PAY FOR THE VERY BASICS LIKE FOOD, HOUSING, MEDICAL CARE, AND HEATING?: SOMEWHAT HARD

## 2020-09-08 SDOH — HEALTH STABILITY: MENTAL HEALTH
STRESS IS WHEN SOMEONE FEELS TENSE, NERVOUS, ANXIOUS, OR CAN'T SLEEP AT NIGHT BECAUSE THEIR MIND IS TROUBLED. HOW STRESSED ARE YOU?: VERY MUCH

## 2020-09-08 SDOH — SOCIAL STABILITY: SOCIAL NETWORK: HOW OFTEN DO YOU ATTEND CHURCH OR RELIGIOUS SERVICES?: NEVER

## 2020-09-08 SDOH — ECONOMIC STABILITY: HOUSING INSECURITY
IN THE LAST 12 MONTHS, WAS THERE A TIME WHEN YOU DID NOT HAVE A STEADY PLACE TO SLEEP OR SLEPT IN A SHELTER (INCLUDING NOW)?: NO

## 2020-09-08 SDOH — HEALTH STABILITY: MENTAL HEALTH: HOW OFTEN DO YOU HAVE 6 OR MORE DRINKS ON ONE OCCASION?: NEVER

## 2020-09-08 SDOH — SOCIAL STABILITY: SOCIAL NETWORK: ARE YOU MARRIED, WIDOWED, DIVORCED, SEPARATED, NEVER MARRIED, OR LIVING WITH A PARTNER?: DIVORCED

## 2020-09-08 SDOH — ECONOMIC STABILITY: TRANSPORTATION INSECURITY
IN THE PAST 12 MONTHS, HAS LACK OF TRANSPORTATION KEPT YOU FROM MEETINGS, WORK, OR FROM GETTING THINGS NEEDED FOR DAILY LIVING?: NO

## 2020-09-08 SDOH — SOCIAL STABILITY: SOCIAL NETWORK: HOW OFTEN DO YOU GET TOGETHER WITH FRIENDS OR RELATIVES?: MORE THAN THREE TIMES A WEEK

## 2020-09-08 SDOH — ECONOMIC STABILITY: HOUSING INSECURITY: IN THE LAST 12 MONTHS, HOW MANY PLACES HAVE YOU LIVED?: 2

## 2020-09-08 SDOH — ECONOMIC STABILITY: INCOME INSECURITY: IN THE LAST 12 MONTHS, WAS THERE A TIME WHEN YOU WERE NOT ABLE TO PAY THE MORTGAGE OR RENT ON TIME?: NO

## 2020-09-08 SDOH — ECONOMIC STABILITY: TRANSPORTATION INSECURITY
IN THE PAST 12 MONTHS, HAS LACK OF RELIABLE TRANSPORTATION KEPT YOU FROM MEDICAL APPOINTMENTS, MEETINGS, WORK OR FROM GETTING THINGS NEEDED FOR DAILY LIVING?: NO

## 2020-09-08 SDOH — HEALTH STABILITY: PHYSICAL HEALTH: ON AVERAGE, HOW MANY MINUTES DO YOU ENGAGE IN EXERCISE AT THIS LEVEL?: 0 MIN

## 2020-09-08 SDOH — SOCIAL STABILITY: SOCIAL NETWORK
IN A TYPICAL WEEK, HOW MANY TIMES DO YOU TALK ON THE PHONE WITH FAMILY, FRIENDS, OR NEIGHBORS?: MORE THAN THREE TIMES A WEEK

## 2020-09-08 SDOH — ECONOMIC STABILITY: TRANSPORTATION INSECURITY
IN THE PAST 12 MONTHS, HAS THE LACK OF TRANSPORTATION KEPT YOU FROM MEDICAL APPOINTMENTS OR FROM GETTING MEDICATIONS?: NO

## 2020-09-08 SDOH — SOCIAL STABILITY: SOCIAL NETWORK: HOW OFTEN DO YOU ATTENT MEETINGS OF THE CLUB OR ORGANIZATION YOU BELONG TO?: NEVER

## 2020-09-08 ASSESSMENT — SOCIAL DETERMINANTS OF HEALTH (SDOH)
HOW HARD IS IT FOR YOU TO PAY FOR THE VERY BASICS LIKE FOOD, HOUSING, MEDICAL CARE, AND HEATING?: SOMEWHAT HARD
HOW OFTEN DO YOU HAVE A DRINK CONTAINING ALCOHOL: NEVER
HOW OFTEN DO YOU GET TOGETHER WITH FRIENDS OR RELATIVES?: MORE THAN THREE TIMES A WEEK
HOW OFTEN DO YOU ATTENT MEETINGS OF THE CLUB OR ORGANIZATION YOU BELONG TO?: NEVER
HOW OFTEN DO YOU HAVE SIX OR MORE DRINKS ON ONE OCCASION: NEVER
DO YOU BELONG TO ANY CLUBS OR ORGANIZATIONS SUCH AS CHURCH GROUPS UNIONS, FRATERNAL OR ATHLETIC GROUPS, OR SCHOOL GROUPS?: NO
IN A TYPICAL WEEK, HOW MANY TIMES DO YOU TALK ON THE PHONE WITH FAMILY, FRIENDS, OR NEIGHBORS?: MORE THAN THREE TIMES A WEEK
WITHIN THE PAST 12 MONTHS, YOU WORRIED THAT YOUR FOOD WOULD RUN OUT BEFORE YOU GOT THE MONEY TO BUY MORE: NEVER TRUE
HOW OFTEN DO YOU ATTEND CHURCH OR RELIGIOUS SERVICES?: NEVER
WITHIN THE PAST 12 MONTHS, THE FOOD YOU BOUGHT JUST DIDN'T LAST AND YOU DIDN'T HAVE MONEY TO GET MORE: NEVER TRUE

## 2020-09-08 ASSESSMENT — FIBROSIS 4 INDEX: FIB4 SCORE: 1.33

## 2020-09-08 NOTE — ASSESSMENT & PLAN NOTE
Chronic issue managed with gabapentin which is helping but not fully controlling her discomfort.  This is worse at night when she is in bed, keeping her awake.  She is interested in dose adjustment.  No side effects related to medication use including dizziness, disorientation, difficulty waking

## 2020-09-08 NOTE — ASSESSMENT & PLAN NOTE
Chronic issue, uncontrolled.  She feels that her insomnia is directly related to her back pain keeping her awake.  She was given trial of trazodone 25 mg but did not find this to be effective.  We may get more benefit from adjustment of her gabapentin dose  No caffeine in the afternoon, no stimulants or tobacco.  She does watch TV in bed.

## 2020-09-08 NOTE — ASSESSMENT & PLAN NOTE
L partial mastectomy followed by reconstruction  Now receiving chemotherapy and radiation  Anticipates being done with treatment in July   Followed by Dr. Aldridge

## 2020-09-08 NOTE — LETTER
[facility]  DAYANA Wolf.  [address]  [facility] [address]  Fax: [phone]   Authorization for Release/Disclosure of   Protected Health Information   Name: ADIA ORTIZ : 1970 SSN: [id number]   Address: [address] [place] [address] Phone:    [phone] (home)    I authorize the entity listed below to release/disclose the PHI below to:   [facility]/PRICILLA Wolf and PRICILLA Wolf   Provider or Entity Name:  [facility]   Address   [facility], [address]            [address] [facility], [place] [address] Phone:  [phone]      Fax:       [phone]          Reason for request: continuity of care   Information to be released:    [ X ] LAST COLONOSCOPY,  including any PATH REPORT and follow-up  [ X ] LAST FIT/COLOGUARD RESULT [  ] LAST DEXA  [  ] LAST MAMMOGRAM  [  ] LAST PAP  [  ] LAST LABS [  ] RETINA EXAM REPORT  [  ] IMMUNIZATION RECORDS  [ x ] Release all info      [  ] Check here and initial the line next to each item to release ALL health information INCLUDING  _____ Care and treatment for drug and / or alcohol abuse  _____ HIV testing, infection status, or AIDS  _____ Genetic Testing    DATES OF SERVICE OR TIME PERIOD TO BE DISCLOSED: _____________  I understand and acknowledge that:  * This Authorization may be revoked at any time by you in writing, except if your health information has already been used or disclosed.  * Your health information that will be used or disclosed as a result of you signing this authorization could be re-disclosed by the recipient. If this occurs, your re-disclosed health information may no longer be protected by State or Federal laws.  * You may refuse to sign this Authorization. Your refusal will not affect your ability to obtain treatment.  * This Authorization becomes effective upon signing and will  on (date) __________.      If no date is indicated, this Authorization will   one (1) year from the signature date.    Name: Lori Kim Vaughn    Signature:   Date:     2020       PLEASE FAX REQUESTED RECORDS BACK TO: [phone]

## 2020-09-08 NOTE — ASSESSMENT & PLAN NOTE
Chronic issue managed with citalopram 30 mg daily.  Overall feels that she is doing okay in terms of her depression but having more anxiety now that she is going through chemotherapy and radiation treatment.  She was given prescription for hydroxyzine but does not find that this has much of an effect on her anxiety.  She is interested in adjusting her citalopram from 30 to 40 mg daily  No SI/HI, history of manic behavior, substance abuse

## 2020-09-08 NOTE — PROGRESS NOTES
Subjective:     Chief Complaint   Patient presents with   • Establish Care     new patient   • Medication Management     med for sleeping is not working   • Medication Refill     Lori Mendes is a 50 y.o. female here today to follow up on:    Malignant neoplasm of left breast in female, estrogen receptor positive (HCC)  L partial mastectomy followed by reconstruction  Now receiving chemotherapy and radiation  Anticipates being done with treatment in July   Followed by Dr. Aldridge     Mild episode of recurrent major depressive disorder (HCC)  Chronic issue managed with citalopram 30 mg daily.  Overall feels that she is doing okay in terms of her depression but having more anxiety now that she is going through chemotherapy and radiation treatment.  She was given prescription for hydroxyzine but does not find that this has much of an effect on her anxiety.  She is interested in adjusting her citalopram from 30 to 40 mg daily  No SI/HI, history of manic behavior, substance abuse    Insomnia  Chronic issue, uncontrolled.  She feels that her insomnia is directly related to her back pain keeping her awake.  She was given trial of trazodone 25 mg but did not find this to be effective.  We may get more benefit from adjustment of her gabapentin dose  No caffeine in the afternoon, no stimulants or tobacco.  She does watch TV in bed.    Lumbar radiculitis  Chronic issue managed with gabapentin which is helping but not fully controlling her discomfort.  This is worse at night when she is in bed, keeping her awake.  She is interested in dose adjustment.  No side effects related to medication use including dizziness, disorientation, difficulty waking       Current medicines (including changes today)  Current Outpatient Medications   Medication Sig Dispense Refill   • hydrOXYzine HCl (ATARAX) 25 MG Tab Take 25 mg by mouth 3 times a day as needed for Itching.     • traZODone (DESYREL) 50 MG Tab Take 50 mg by mouth every  "evening.     • gabapentin (NEURONTIN) 300 MG Cap Take 2 Caps by mouth 3 times a day. 180 Cap 1   • loperamide (IMODIUM) 2 MG Cap Take 2 mg by mouth 4 times a day as needed for Diarrhea.     • gabapentin (NEURONTIN) 300 MG Cap Take 400 mg by mouth 2 Times a Day.     • prochlorperazine (COMPAZINE) 10 MG Tab Take 10 mg by mouth every 6 hours as needed for Nausea/Vomiting.     • ondansetron (ZOFRAN) 4 MG Tab tablet Take 4 mg by mouth every 8 hours as needed for Nausea/Vomiting.     • lidocaine-prilocaine (EMLA) 2.5-2.5 % Cream Apply 1 g to affected area(s) 1 time daily as needed (Port Access).     • diphenoxylate-atropine (LOMOTIL) 2.5-0.025 MG Tab Take 1 Tab by mouth 3 times a day as needed for Diarrhea.     • CALCIUM PO Take 1 Dose by mouth every morning. Unknown OTC Strength.     • Magnesium 500 MG Cap Take 500 mg by mouth every morning.     • VITAMIN D PO Take 1 Dose by mouth every morning. Unknown OTC Strength.     • POTASSIUM PO Take 1 Dose by mouth every morning. Unknown OTC Strength.       No current facility-administered medications for this visit.      She  has a past medical history of Anxiety disorder (1/9/2012), Bowel habit changes, Breast cancer (HCC), Cancer (HCC) (12/20/2019), GERD (gastroesophageal reflux disease), Heart burn, Lumbar radiculitis (2/7/2012), Malig neoplasm of upper-outer quadrant of unsp female breast (HCC) (12/19/2019), Obesity (BMI 30-39.9) (1/4/2013), and S/P laparoscopic cholecystectomy (10/9/14).    ROS included above     Objective:     /88 (BP Location: Right arm, Patient Position: Sitting, BP Cuff Size: Adult long)   Pulse 87   Temp 36.4 °C (97.5 °F) (Temporal)   Resp 16   Ht 1.651 m (5' 5\")   Wt 106 kg (233 lb 11 oz)   SpO2 97%  Body mass index is 38.89 kg/m².     Physical Exam:  General: Alert, oriented in no acute distress.  Eye contact is good, speech is normal, affect calm  HEENT: Oral mucosa pink moist, no lesions. TMs gray with good landmarks bilaterally. No " lymphadenopathy.  Lungs: clear to auscultation bilaterally, normal effort, no wheeze/ rhonchi/ rales.  CV: regular rate and rhythm, S1, S2, no murmur  Abdomen: soft, nontender  Ext: no edema, color normal, vascularity normal, temperature normal    Assessment and Plan:   The following treatment plan was discussed   1. Mild episode of recurrent major depressive disorder (HCC)   increase in anxiety with chemotherapy and radiation treatment, feels that she would benefit from dose adjustment.  We will increase her citalopram to 40 mg daily, she has plenty of medication at this time and will let me know when she needs a refill.   2. Primary insomnia   patient feels that her insomnia is related to back pain.  She was given trial of trazodone but did not find it to be helpful.  We will try adjusting her gabapentin, if this does not get her sleeping better she will contact me   3. Lumbar radiculitis   chronic issue, some relief with gabapentin although not fully controlled.  May increase evening dose, she will let me know if this is helpful  gabapentin (NEURONTIN) 300 MG Cap   4. Malignant neoplasm of left breast in female, estrogen receptor positive, unspecified site of breast (HCC)   currently in chemotherapy as well as radiation treatment.  Followed by Dr. Aldridge       Followup: As needed         Please note that this dictation was created using voice recognition software. I have worked with consultants from the vendor as well as technical experts from [employer] to optimize the interface. I have made every reasonable attempt to correct obvious errors, but I expect that there are errors of grammar and possibly content that I did not discover before finalizing the note.

## 2020-09-09 ENCOUNTER — HOSPITAL ENCOUNTER (OUTPATIENT)
Dept: RADIATION ONCOLOGY | Facility: MEDICAL CENTER | Age: 50
End: 2020-09-09
Payer: COMMERCIAL

## 2020-09-09 ENCOUNTER — HOSPITAL ENCOUNTER (OUTPATIENT)
Dept: LAB | Facility: MEDICAL CENTER | Age: 50
End: 2020-09-09
Attending: NURSE PRACTITIONER
Payer: COMMERCIAL

## 2020-09-09 VITALS
SYSTOLIC BLOOD PRESSURE: 124 MMHG | TEMPERATURE: 97.7 F | HEART RATE: 85 BPM | OXYGEN SATURATION: 96 % | DIASTOLIC BLOOD PRESSURE: 84 MMHG

## 2020-09-09 LAB
ALBUMIN SERPL BCP-MCNC: 4.3 G/DL (ref 3.2–4.9)
ALBUMIN/GLOB SERPL: 1.3 G/DL
ALP SERPL-CCNC: 101 U/L (ref 30–99)
ALT SERPL-CCNC: 14 U/L (ref 2–50)
ANION GAP SERPL CALC-SCNC: 17 MMOL/L (ref 7–16)
AST SERPL-CCNC: 26 U/L (ref 12–45)
BILIRUB SERPL-MCNC: 0.6 MG/DL (ref 0.1–1.5)
BUN SERPL-MCNC: 16 MG/DL (ref 8–22)
CALCIUM SERPL-MCNC: 9.9 MG/DL (ref 8.5–10.5)
CHEMOTHERAPY INFUSION START DATE: NORMAL
CHEMOTHERAPY RECORDS: 1.8
CHEMOTHERAPY RECORDS: 1.8
CHEMOTHERAPY RECORDS: 5040
CHEMOTHERAPY RECORDS: 5040
CHEMOTHERAPY RECORDS: NORMAL
CHEMOTHERAPY RX CANCER: NORMAL
CHLORIDE SERPL-SCNC: 101 MMOL/L (ref 96–112)
CO2 SERPL-SCNC: 23 MMOL/L (ref 20–33)
CREAT SERPL-MCNC: 0.82 MG/DL (ref 0.5–1.4)
DATE 1ST CHEMO CANCER: NORMAL
GLOBULIN SER CALC-MCNC: 3.2 G/DL (ref 1.9–3.5)
GLUCOSE SERPL-MCNC: 101 MG/DL (ref 65–99)
POTASSIUM SERPL-SCNC: 4 MMOL/L (ref 3.6–5.5)
PROT SERPL-MCNC: 7.5 G/DL (ref 6–8.2)
RAD ONC ARIA COURSE LAST TREATMENT DATE: NORMAL
RAD ONC ARIA COURSE TREATMENT ELAPSED DAYS: NORMAL
RAD ONC ARIA REFERENCE POINT DOSAGE GIVEN TO DATE: 36
RAD ONC ARIA REFERENCE POINT ID: NORMAL
RAD ONC ARIA REFERENCE POINT SESSION DOSAGE GIVEN: 1.8
SODIUM SERPL-SCNC: 141 MMOL/L (ref 135–145)

## 2020-09-09 PROCEDURE — 80053 COMPREHEN METABOLIC PANEL: CPT

## 2020-09-09 PROCEDURE — 77427 RADIATION TX MANAGEMENT X5: CPT | Performed by: RADIOLOGY

## 2020-09-09 PROCEDURE — 77412 RADIATION TX DELIVERY LVL 3: CPT | Performed by: RADIOLOGY

## 2020-09-09 PROCEDURE — 36415 COLL VENOUS BLD VENIPUNCTURE: CPT

## 2020-09-09 ASSESSMENT — PAIN SCALES - GENERAL: PAINLEVEL: NO PAIN

## 2020-09-09 NOTE — ON TREATMENT VISIT
ON TREATMENT NOTE  RADIATION ONCOLOGY DEPARTMENT    Patient name:  Lori Mendes    Primary Physician:  PRICILLA Wolf MRN: [id number]  [facility]: [id number]   Referring physician:  Yaima Najrea M.D. : 1970, 50 y.o.     ENCOUNTER DATE:  20    DIAGNOSIS:    Malignant neoplasm of left breast in female, estrogen receptor positive (HCC)  Staging form: Breast, AJCC 8th Edition  - Pathologic: No Stage Recommended (ypT2, pN0(i+), cM0, G3, ER+, [place]+, HER2+) - Signed by Sylvain Kim M.D. on 2020  Stage prefix: Post-therapy  Histologic grading system: 3 grade system      TREATMENT SUMMARY:  Aria Treatment Information        Some values may be hidden. Unless noted otherwise, only the newest values recorded on each date are displayed.         Aria Treatment Summary 20   Course First Treatment Date 2020   Course Last Treatment Date 2020   L_Breast Plan from Course C1_LBreast   Fraction    Elapsed Course Days  @    Prescribed Fraction Dose 180 cGy   Prescribed Total Dose 5,040 cGy   L_Supraclav Plan from Course C1_LBreast   Fraction    Elapsed Course Days  @    Prescribed Fraction Dose 180 cGy   Prescribed Total Dose 5,040 cGy   Breast DPV Reference Point from Course C1_LBreast   Elapsed Course Days 28 @    Session Dose 180 cGy   Total Dose 3,600 cGy   L breast cp Reference Point from Course C1_LBreast   Elapsed Course Days 28 @    Session Dose 180 cGy   Total Dose 3,600 cGy   L sc cp Reference Point from Course C1_LBreast   Elapsed Course Days  @    Session Dose 180 cGy   Total Dose 3,600 cGy   SC DPV Reference Point from Course C1_LBreast   Elapsed Course Days  @    Session Dose 180 cGy   Total Dose 3,600 cGy              SUBJECTIVE:   Patient is doing well.  She is starting to notice some moist desquamation in the inframammary fold.  He was given Silvadene yesterday but has not  yet applied.    VITAL SIGNS:    Encounter Vitals  Temperature: 36.5 °C (97.7 °F)  Blood Pressure: 124/84  Pulse: 85  Pulse Oximetry: 96 %  Pain Score: No pain  Pain Assessment 9/9/2020 8/26/2020 8/12/2020 8/12/2020 7/30/2020 7/30/2020   Pain Assessment - - Denies Pain - Denies Pain -   Pain Score 0 0 0 0 0 0   Some recent data might be hidden          PHYSICAL EXAM:  Erythema in the inframammary fold with a small linear area of moist desquamation mild to moderate erythema in the remainder of the breast    TOXICITY  Toxicity Assessment 9/9/2020 8/26/2020 8/12/2020   Toxicity Assessment Breast Breast Breast   Fatigue (lethargy, malaise, asthenia) None None None   Fever (in the absence of neutropenia) None None None   Radiation Dermatitis Confluent moist desquamation 1.5 cm diameter or more and not confined to skin folds, with/without pitting edema None None   Lymphatics Normal Normal Normal   RT - Pain due to RT None Mild pain not interfering with function None   Dyspnea Normal Normal Normal         IMPRESSION:  Cancer Staging  Malignant neoplasm of left breast in female, estrogen receptor positive (HCC)  Staging form: Breast, AJCC 8th Edition  - Clinical: Stage IB (cT2, cN1, cM0, G3, ER+, [place]+, HER2+) - Unsigned  - Pathologic: No Stage Recommended (ypT2, pN0(i+), cM0, G3, ER+, [place]+, HER2+) - Signed by Sylvain Kim M.D. on 7/2/2020      PLAN:  No change in treatment plan given Govind yesterday to add to her topical regimen    Disposition:  Treatment plan reviewed. Questions answered. Continue therapy outlined.     Sylvain Kim M.D.    No orders of the defined types were placed in this encounter.

## 2020-09-10 ENCOUNTER — HOSPITAL ENCOUNTER (OUTPATIENT)
Dept: RADIATION ONCOLOGY | Facility: MEDICAL CENTER | Age: 50
End: 2020-09-10
Payer: COMMERCIAL

## 2020-09-10 LAB

## 2020-09-10 PROCEDURE — 77412 RADIATION TX DELIVERY LVL 3: CPT | Performed by: RADIOLOGY

## 2020-09-10 PROCEDURE — 77417 THER RADIOLOGY PORT IMAGE(S): CPT | Performed by: RADIOLOGY

## 2020-09-11 ENCOUNTER — HOSPITAL ENCOUNTER (OUTPATIENT)
Dept: RADIATION ONCOLOGY | Facility: MEDICAL CENTER | Age: 50
End: 2020-09-11
Payer: COMMERCIAL

## 2020-09-11 LAB

## 2020-09-11 PROCEDURE — 77412 RADIATION TX DELIVERY LVL 3: CPT | Performed by: RADIOLOGY

## 2020-09-14 ENCOUNTER — HOSPITAL ENCOUNTER (OUTPATIENT)
Dept: RADIATION ONCOLOGY | Facility: MEDICAL CENTER | Age: 50
End: 2020-09-14
Payer: COMMERCIAL

## 2020-09-14 LAB

## 2020-09-14 PROCEDURE — 77412 RADIATION TX DELIVERY LVL 3: CPT | Performed by: RADIOLOGY

## 2020-09-14 PROCEDURE — 77336 RADIATION PHYSICS CONSULT: CPT | Performed by: RADIOLOGY

## 2020-09-15 ENCOUNTER — HOSPITAL ENCOUNTER (OUTPATIENT)
Dept: RADIATION ONCOLOGY | Facility: MEDICAL CENTER | Age: 50
End: 2020-09-15
Payer: COMMERCIAL

## 2020-09-15 LAB

## 2020-09-15 PROCEDURE — 77412 RADIATION TX DELIVERY LVL 3: CPT | Performed by: RADIOLOGY

## 2020-09-16 ENCOUNTER — HOSPITAL ENCOUNTER (OUTPATIENT)
Dept: RADIATION ONCOLOGY | Facility: MEDICAL CENTER | Age: 50
End: 2020-09-16
Payer: COMMERCIAL

## 2020-09-16 VITALS
SYSTOLIC BLOOD PRESSURE: 120 MMHG | DIASTOLIC BLOOD PRESSURE: 60 MMHG | TEMPERATURE: 97.5 F | OXYGEN SATURATION: 97 % | HEART RATE: 85 BPM

## 2020-09-16 LAB
CHEMOTHERAPY INFUSION START DATE: NORMAL
CHEMOTHERAPY RECORDS: 1.8
CHEMOTHERAPY RECORDS: 1.8
CHEMOTHERAPY RECORDS: 5040
CHEMOTHERAPY RECORDS: 5040
CHEMOTHERAPY RECORDS: NORMAL
CHEMOTHERAPY RX CANCER: NORMAL
DATE 1ST CHEMO CANCER: NORMAL
RAD ONC ARIA COURSE LAST TREATMENT DATE: NORMAL
RAD ONC ARIA COURSE TREATMENT ELAPSED DAYS: NORMAL
RAD ONC ARIA REFERENCE POINT DOSAGE GIVEN TO DATE: 45
RAD ONC ARIA REFERENCE POINT ID: NORMAL
RAD ONC ARIA REFERENCE POINT SESSION DOSAGE GIVEN: 1.8

## 2020-09-16 PROCEDURE — 77412 RADIATION TX DELIVERY LVL 3: CPT | Performed by: RADIOLOGY

## 2020-09-16 PROCEDURE — 77427 RADIATION TX MANAGEMENT X5: CPT | Performed by: RADIOLOGY

## 2020-09-16 ASSESSMENT — PAIN SCALES - GENERAL: PAINLEVEL: NO PAIN

## 2020-09-16 NOTE — ON TREATMENT VISIT
ON TREATMENT NOTE  RADIATION ONCOLOGY DEPARTMENT    Patient name:  Lori Mendes    Primary Physician:  PRICILLA Wolf MRN: [id number]  [facility]: [id number]   Referring physician:  Yaima Najera M.D. : 1970, 50 y.o.     ENCOUNTER DATE:  20    DIAGNOSIS:    Malignant neoplasm of left breast in female, estrogen receptor positive (HCC)  Staging form: Breast, AJCC 8th Edition  - Pathologic: No Stage Recommended (ypT2, pN0(i+), cM0, G3, ER+, [place]+, HER2+) - Signed by Sylvain Kim M.D. on 2020  Stage prefix: Post-therapy  Histologic grading system: 3 grade system      TREATMENT SUMMARY:  Aria Treatment Information        Some values may be hidden. Unless noted otherwise, only the newest values recorded on each date are displayed.         Aria Treatment Summary 20   Course First Treatment Date 2020   Course Last Treatment Date 2020   L_Breast Plan from Course C1_LBreast   Fraction 25 of 28   Elapsed Course Days  @    Prescribed Fraction Dose 180 cGy   Prescribed Total Dose 5,040 cGy   L_Supraclav Plan from Course C1_LBreast   Fraction 25 of    Elapsed Course Days [id number]   Prescribed Fraction Dose 180 cGy   Prescribed Total Dose 5,040 cGy   Breast DPV Reference Point from Course C1_LBreast   Elapsed Course Days    Session Dose 180 cGy   Total Dose 4,500 cGy   L breast cp Reference Point from Course C1_LBreast   Elapsed Course Days    Session Dose 180 cGy   Total Dose 4,500 cGy   L sc cp Reference Point from Course C1_LBreast   Elapsed Course Days [id number]   Session Dose 180 cGy   Total Dose 4,500 cGy   SC DPV Reference Point from Course C1_LBreast   Elapsed Course Days    Session Dose 180 cGy   Total Dose 4,500 cGy              SUBJECTIVE:   Patient is doing well.  She has mild to moderate erythema throughout the whole radiation treatment area with more dry desquamation in the mid  axillary line.    VITAL SIGNS:    Encounter Vitals  Temperature: 36.4 °C (97.5 °F)  Blood Pressure: 120/60  Pulse: 85  Pulse Oximetry: 97 %  Pain Score: No pain  Pain Assessment 9/16/2020 9/9/2020 8/26/2020 8/12/2020 8/12/2020   Pain Assessment - - - Denies Pain -   Pain Score 0 0 0 0 0   Some recent data might be hidden          PHYSICAL EXAM:  Moderate in the radiation treatment field with dry desquamation in the axilla    TOXICITY  Toxicity Assessment 9/16/2020 9/9/2020 8/26/2020 8/12/2020   Toxicity Assessment Breast Breast Breast Breast   Fatigue (lethargy, malaise, asthenia) None None None None   Fever (in the absence of neutropenia) None None None None   Radiation Dermatitis Moderate to brisk erythema or a patchy moist desquamation, mostly confined to skin folds and creases, with/without moderate edema Confluent moist desquamation 1.5 cm diameter or more and not confined to skin folds, with/without pitting edema None None   Lymphatics Normal Normal Normal Normal   RT - Pain due to RT Mild pain not interfering with function None Mild pain not interfering with function None   Dyspnea Normal Normal Normal Normal         IMPRESSION:  Cancer Staging  Malignant neoplasm of left breast in female, estrogen receptor positive (HCC)  Staging form: Breast, AJCC 8th Edition  - Clinical: Stage IB (cT2, cN1, cM0, G3, ER+, [place]+, HER2+) - Unsigned  - Pathologic: No Stage Recommended (ypT2, pN0(i+), cM0, G3, ER+, [place]+, HER2+) - Signed by Sylvain Kim M.D. on 7/2/2020      PLAN:  No change in treatment plan    Disposition:  Treatment plan reviewed. Questions answered. Continue therapy outlined.     Sylvain Kim M.D.    No orders of the defined types were placed in this encounter.

## 2020-09-17 ENCOUNTER — HOSPITAL ENCOUNTER (OUTPATIENT)
Dept: RADIATION ONCOLOGY | Facility: MEDICAL CENTER | Age: 50
End: 2020-09-17
Payer: COMMERCIAL

## 2020-09-17 LAB

## 2020-09-17 PROCEDURE — 77412 RADIATION TX DELIVERY LVL 3: CPT | Performed by: RADIOLOGY

## 2020-09-17 PROCEDURE — 77417 THER RADIOLOGY PORT IMAGE(S): CPT | Performed by: RADIOLOGY

## 2020-09-18 ENCOUNTER — HOSPITAL ENCOUNTER (OUTPATIENT)
Dept: RADIATION ONCOLOGY | Facility: MEDICAL CENTER | Age: 50
End: 2020-09-18
Payer: COMMERCIAL

## 2020-09-18 LAB
CHEMOTHERAPY INFUSION START DATE: NORMAL
CHEMOTHERAPY INFUSION START DATE: NORMAL
CHEMOTHERAPY RECORDS: 1.8
CHEMOTHERAPY RECORDS: 1.8
CHEMOTHERAPY RECORDS: 5040
CHEMOTHERAPY RECORDS: 5040
CHEMOTHERAPY RECORDS: NORMAL
CHEMOTHERAPY RX CANCER: NORMAL
CHEMOTHERAPY RX CANCER: NORMAL
DATE 1ST CHEMO CANCER: NORMAL
DATE 1ST CHEMO CANCER: NORMAL
RAD ONC ARIA COURSE LAST TREATMENT DATE: NORMAL
RAD ONC ARIA COURSE LAST TREATMENT DATE: NORMAL
RAD ONC ARIA COURSE TREATMENT ELAPSED DAYS: NORMAL
RAD ONC ARIA COURSE TREATMENT ELAPSED DAYS: NORMAL
RAD ONC ARIA REFERENCE POINT DOSAGE GIVEN TO DATE: 48.6
RAD ONC ARIA REFERENCE POINT ID: NORMAL
RAD ONC ARIA REFERENCE POINT SESSION DOSAGE GIVEN: 1.8

## 2020-09-18 PROCEDURE — 77412 RADIATION TX DELIVERY LVL 3: CPT | Performed by: RADIOLOGY

## 2020-09-21 ENCOUNTER — HOSPITAL ENCOUNTER (OUTPATIENT)
Dept: RADIATION ONCOLOGY | Facility: MEDICAL CENTER | Age: 50
End: 2020-09-21
Payer: COMMERCIAL

## 2020-09-21 LAB
CHEMOTHERAPY INFUSION START DATE: NORMAL
CHEMOTHERAPY INFUSION START DATE: NORMAL
CHEMOTHERAPY INFUSION STOP DATE: NORMAL
CHEMOTHERAPY RECORDS: 1.8
CHEMOTHERAPY RECORDS: 5040
CHEMOTHERAPY RECORDS: NORMAL
CHEMOTHERAPY RX CANCER: NORMAL
CHEMOTHERAPY RX CANCER: NORMAL
DATE 1ST CHEMO CANCER: NORMAL
DATE 1ST CHEMO CANCER: NORMAL
RAD ONC ARIA COURSE LAST TREATMENT DATE: NORMAL
RAD ONC ARIA COURSE LAST TREATMENT DATE: NORMAL
RAD ONC ARIA COURSE TREATMENT ELAPSED DAYS: NORMAL
RAD ONC ARIA COURSE TREATMENT ELAPSED DAYS: NORMAL
RAD ONC ARIA REFERENCE POINT DOSAGE GIVEN TO DATE: 50.4
RAD ONC ARIA REFERENCE POINT ID: NORMAL
RAD ONC ARIA REFERENCE POINT SESSION DOSAGE GIVEN: 1.8

## 2020-09-21 PROCEDURE — 77427 RADIATION TX MANAGEMENT X5: CPT | Performed by: RADIOLOGY

## 2020-09-21 PROCEDURE — 77336 RADIATION PHYSICS CONSULT: CPT | Performed by: RADIOLOGY

## 2020-09-21 PROCEDURE — 77412 RADIATION TX DELIVERY LVL 3: CPT | Performed by: RADIOLOGY

## 2020-09-28 ENCOUNTER — HOSPITAL ENCOUNTER (OUTPATIENT)
Dept: LAB | Facility: MEDICAL CENTER | Age: 50
End: 2020-09-28
Attending: NURSE PRACTITIONER
Payer: COMMERCIAL

## 2020-09-28 LAB
ALBUMIN SERPL BCP-MCNC: 4.1 G/DL (ref 3.2–4.9)
ALBUMIN/GLOB SERPL: 1.3 G/DL
ALP SERPL-CCNC: 105 U/L (ref 30–99)
ALT SERPL-CCNC: 15 U/L (ref 2–50)
ANION GAP SERPL CALC-SCNC: 15 MMOL/L (ref 7–16)
AST SERPL-CCNC: 22 U/L (ref 12–45)
BILIRUB SERPL-MCNC: 0.6 MG/DL (ref 0.1–1.5)
BUN SERPL-MCNC: 12 MG/DL (ref 8–22)
CALCIUM SERPL-MCNC: 9.9 MG/DL (ref 8.5–10.5)
CHLORIDE SERPL-SCNC: 103 MMOL/L (ref 96–112)
CO2 SERPL-SCNC: 25 MMOL/L (ref 20–33)
CREAT SERPL-MCNC: 0.77 MG/DL (ref 0.5–1.4)
GLOBULIN SER CALC-MCNC: 3.1 G/DL (ref 1.9–3.5)
GLUCOSE SERPL-MCNC: 109 MG/DL (ref 65–99)
POTASSIUM SERPL-SCNC: 4 MMOL/L (ref 3.6–5.5)
PROT SERPL-MCNC: 7.2 G/DL (ref 6–8.2)
SODIUM SERPL-SCNC: 143 MMOL/L (ref 135–145)

## 2020-09-28 PROCEDURE — 80053 COMPREHEN METABOLIC PANEL: CPT

## 2020-09-28 PROCEDURE — 36415 COLL VENOUS BLD VENIPUNCTURE: CPT

## 2020-10-19 ENCOUNTER — HOSPITAL ENCOUNTER (OUTPATIENT)
Dept: LAB | Facility: MEDICAL CENTER | Age: 50
End: 2020-10-19
Attending: NURSE PRACTITIONER
Payer: COMMERCIAL

## 2020-10-19 LAB
ALBUMIN SERPL BCP-MCNC: 4.3 G/DL (ref 3.2–4.9)
ALBUMIN/GLOB SERPL: 1.2 G/DL
ALP SERPL-CCNC: 99 U/L (ref 30–99)
ALT SERPL-CCNC: 13 U/L (ref 2–50)
ANION GAP SERPL CALC-SCNC: 10 MMOL/L (ref 7–16)
AST SERPL-CCNC: 24 U/L (ref 12–45)
BILIRUB SERPL-MCNC: 1.2 MG/DL (ref 0.1–1.5)
BUN SERPL-MCNC: 16 MG/DL (ref 8–22)
CALCIUM SERPL-MCNC: 10.4 MG/DL (ref 8.5–10.5)
CHLORIDE SERPL-SCNC: 103 MMOL/L (ref 96–112)
CO2 SERPL-SCNC: 27 MMOL/L (ref 20–33)
CREAT SERPL-MCNC: 0.8 MG/DL (ref 0.5–1.4)
GLOBULIN SER CALC-MCNC: 3.6 G/DL (ref 1.9–3.5)
GLUCOSE SERPL-MCNC: 101 MG/DL (ref 65–99)
POTASSIUM SERPL-SCNC: 3.8 MMOL/L (ref 3.6–5.5)
PROT SERPL-MCNC: 7.9 G/DL (ref 6–8.2)
SODIUM SERPL-SCNC: 140 MMOL/L (ref 135–145)

## 2020-10-19 PROCEDURE — 80053 COMPREHEN METABOLIC PANEL: CPT

## 2020-10-19 PROCEDURE — 36415 COLL VENOUS BLD VENIPUNCTURE: CPT

## 2020-10-23 ENCOUNTER — PATIENT OUTREACH (OUTPATIENT)
Dept: OTHER | Facility: MEDICAL CENTER | Age: 50
End: 2020-10-23

## 2020-10-23 ENCOUNTER — PATIENT MESSAGE (OUTPATIENT)
Dept: HEALTH INFORMATION MANAGEMENT | Facility: OTHER | Age: 50
End: 2020-10-23

## 2020-10-23 NOTE — PROGRESS NOTES
Phoned pt to review cancer treatment summary.  No answer, [email or web address].  SCP and intro message sent via [email or web address]

## 2020-10-29 ENCOUNTER — HOSPITAL ENCOUNTER (OUTPATIENT)
Dept: RADIATION ONCOLOGY | Facility: MEDICAL CENTER | Age: 50
End: 2020-10-31
Attending: RADIOLOGY
Payer: COMMERCIAL

## 2020-10-29 NOTE — PROGRESS NOTES
Telephone Appointment Visit   As a means of avoiding spread of COVID-19, this visit is being conducted by telephone. This telephone visit was initiated by the patient and they verbally consented.    Time at start of call: 8:00    Reason for Call:  Symptom Follow-up    HPI:    Stage IB ([id number]) G3 invasive ductal carcinoma of the left upper outer quadrant of breast ER MO positive HER-2/raymond positive with a Ki-67 of 20% receiving neoadjuvant TCH plus P followed by a lumpectomy and axillary lymph node dissection [id number]+ planning on tamoxifen and TDM 1 inhibitor completing comprehensive breast and lymphatic radiotherapy to 5040 cGy in September 2020    Patient states after completing her radiation she did not have any significant untoward effects.  She continued to experience hyperpigmentation which is slowly fading back towards her baseline.  She is not yet fully at her baseline but feels she is nearly 90% resolved.  She continues to tolerate her tamoxifen and TDM 1 inhibitor.    Labs / Images Reviewed:   None    Assessment and Plan:     Patient continues to follow in medical oncology for her tamoxifen and TDM 1 inhibitor I will release her from active follow-up in radiation oncology    Follow-up: As needed    Time at end of call: 8:15 AM  Total Time Spent: 11-20 minutes    Sylvain Kim M.D.

## 2020-11-06 ENCOUNTER — HOSPITAL ENCOUNTER (OUTPATIENT)
Dept: RADIOLOGY | Facility: MEDICAL CENTER | Age: 50
End: 2020-11-06
Attending: INTERNAL MEDICINE
Payer: COMMERCIAL

## 2020-11-06 DIAGNOSIS — C50.812 MALIGNANT NEOPLASM OF OVERLAPPING SITES OF LEFT FEMALE BREAST, UNSPECIFIED ESTROGEN RECEPTOR STATUS (HCC): ICD-10-CM

## 2020-11-06 PROCEDURE — 76642 ULTRASOUND BREAST LIMITED: CPT | Mod: RT

## 2020-11-06 PROCEDURE — G0279 TOMOSYNTHESIS, MAMMO: HCPCS

## 2020-11-09 ENCOUNTER — HOSPITAL ENCOUNTER (OUTPATIENT)
Dept: LAB | Facility: MEDICAL CENTER | Age: 50
End: 2020-11-09
Attending: NURSE PRACTITIONER
Payer: COMMERCIAL

## 2020-11-09 LAB
ALBUMIN SERPL BCP-MCNC: 4.1 G/DL (ref 3.2–4.9)
ALBUMIN/GLOB SERPL: 1.2 G/DL
ALP SERPL-CCNC: 100 U/L (ref 30–99)
ALT SERPL-CCNC: 17 U/L (ref 2–50)
ANION GAP SERPL CALC-SCNC: 16 MMOL/L (ref 7–16)
AST SERPL-CCNC: 30 U/L (ref 12–45)
BILIRUB SERPL-MCNC: 1.5 MG/DL (ref 0.1–1.5)
BUN SERPL-MCNC: 12 MG/DL (ref 8–22)
CALCIUM SERPL-MCNC: 10.5 MG/DL (ref 8.5–10.5)
CHLORIDE SERPL-SCNC: 100 MMOL/L (ref 96–112)
CO2 SERPL-SCNC: 25 MMOL/L (ref 20–33)
CREAT SERPL-MCNC: 0.77 MG/DL (ref 0.5–1.4)
GLOBULIN SER CALC-MCNC: 3.5 G/DL (ref 1.9–3.5)
GLUCOSE SERPL-MCNC: 108 MG/DL (ref 65–99)
POTASSIUM SERPL-SCNC: 3.6 MMOL/L (ref 3.6–5.5)
PROT SERPL-MCNC: 7.6 G/DL (ref 6–8.2)
SODIUM SERPL-SCNC: 141 MMOL/L (ref 135–145)

## 2020-11-09 PROCEDURE — 36415 COLL VENOUS BLD VENIPUNCTURE: CPT

## 2020-11-09 PROCEDURE — 80053 COMPREHEN METABOLIC PANEL: CPT

## 2020-11-12 DIAGNOSIS — M54.16 LUMBAR RADICULITIS: ICD-10-CM

## 2020-11-12 RX ORDER — GABAPENTIN 300 MG/1
600 CAPSULE ORAL 3 TIMES DAILY
Qty: 180 CAP | Refills: 1 | Status: SHIPPED | OUTPATIENT
Start: 2020-11-12 | End: 2021-03-29

## 2020-11-30 ENCOUNTER — HOSPITAL ENCOUNTER (OUTPATIENT)
Dept: LAB | Facility: MEDICAL CENTER | Age: 50
End: 2020-11-30
Attending: NURSE PRACTITIONER
Payer: COMMERCIAL

## 2020-11-30 LAB
ALBUMIN SERPL BCP-MCNC: 4.1 G/DL (ref 3.2–4.9)
ALBUMIN/GLOB SERPL: 1.2 G/DL
ALP SERPL-CCNC: 89 U/L (ref 30–99)
ALT SERPL-CCNC: 13 U/L (ref 2–50)
ANION GAP SERPL CALC-SCNC: 11 MMOL/L (ref 7–16)
AST SERPL-CCNC: 25 U/L (ref 12–45)
BILIRUB SERPL-MCNC: 1.8 MG/DL (ref 0.1–1.5)
BUN SERPL-MCNC: 13 MG/DL (ref 8–22)
CALCIUM SERPL-MCNC: 10.2 MG/DL (ref 8.5–10.5)
CHLORIDE SERPL-SCNC: 104 MMOL/L (ref 96–112)
CO2 SERPL-SCNC: 26 MMOL/L (ref 20–33)
CREAT SERPL-MCNC: 0.7 MG/DL (ref 0.5–1.4)
GLOBULIN SER CALC-MCNC: 3.5 G/DL (ref 1.9–3.5)
GLUCOSE SERPL-MCNC: 108 MG/DL (ref 65–99)
POTASSIUM SERPL-SCNC: 3.7 MMOL/L (ref 3.6–5.5)
PROT SERPL-MCNC: 7.6 G/DL (ref 6–8.2)
SODIUM SERPL-SCNC: 141 MMOL/L (ref 135–145)

## 2020-11-30 PROCEDURE — 36415 COLL VENOUS BLD VENIPUNCTURE: CPT

## 2020-11-30 PROCEDURE — 80053 COMPREHEN METABOLIC PANEL: CPT

## 2020-12-18 ENCOUNTER — HOSPITAL ENCOUNTER (OUTPATIENT)
Dept: LAB | Facility: MEDICAL CENTER | Age: 50
End: 2020-12-18
Attending: NURSE PRACTITIONER
Payer: COMMERCIAL

## 2020-12-18 LAB
ALBUMIN SERPL BCP-MCNC: 4.2 G/DL (ref 3.2–4.9)
ALBUMIN/GLOB SERPL: 1.4 G/DL
ALP SERPL-CCNC: 78 U/L (ref 30–99)
ALT SERPL-CCNC: 30 U/L (ref 2–50)
ANION GAP SERPL CALC-SCNC: 11 MMOL/L (ref 7–16)
AST SERPL-CCNC: 51 U/L (ref 12–45)
BILIRUB SERPL-MCNC: 2 MG/DL (ref 0.1–1.5)
BUN SERPL-MCNC: 13 MG/DL (ref 8–22)
CALCIUM SERPL-MCNC: 10 MG/DL (ref 8.5–10.5)
CHLORIDE SERPL-SCNC: 105 MMOL/L (ref 96–112)
CO2 SERPL-SCNC: 26 MMOL/L (ref 20–33)
CREAT SERPL-MCNC: 0.81 MG/DL (ref 0.5–1.4)
GLOBULIN SER CALC-MCNC: 3.1 G/DL (ref 1.9–3.5)
GLUCOSE SERPL-MCNC: 102 MG/DL (ref 65–99)
POTASSIUM SERPL-SCNC: 3.8 MMOL/L (ref 3.6–5.5)
PROT SERPL-MCNC: 7.3 G/DL (ref 6–8.2)
SODIUM SERPL-SCNC: 142 MMOL/L (ref 135–145)

## 2020-12-18 PROCEDURE — 36415 COLL VENOUS BLD VENIPUNCTURE: CPT

## 2020-12-18 PROCEDURE — 80053 COMPREHEN METABOLIC PANEL: CPT

## 2020-12-22 ENCOUNTER — HOSPITAL ENCOUNTER (OUTPATIENT)
Dept: LAB | Facility: MEDICAL CENTER | Age: 50
End: 2020-12-22
Attending: NURSE PRACTITIONER
Payer: COMMERCIAL

## 2020-12-22 LAB — BILIRUB SERPL-MCNC: 1.7 MG/DL (ref 0.1–1.5)

## 2020-12-22 PROCEDURE — 82247 BILIRUBIN TOTAL: CPT

## 2021-01-11 ENCOUNTER — HOSPITAL ENCOUNTER (OUTPATIENT)
Dept: LAB | Facility: MEDICAL CENTER | Age: 51
End: 2021-01-11
Attending: NURSE PRACTITIONER
Payer: COMMERCIAL

## 2021-01-11 LAB
ALBUMIN SERPL BCP-MCNC: 3.8 G/DL (ref 3.2–4.9)
ALBUMIN/GLOB SERPL: 1.1 G/DL
ALP SERPL-CCNC: 57 U/L (ref 30–99)
ALT SERPL-CCNC: 12 U/L (ref 2–50)
ANION GAP SERPL CALC-SCNC: 11 MMOL/L (ref 7–16)
AST SERPL-CCNC: 26 U/L (ref 12–45)
BILIRUB SERPL-MCNC: 1.3 MG/DL (ref 0.1–1.5)
BUN SERPL-MCNC: 13 MG/DL (ref 8–22)
CALCIUM SERPL-MCNC: 9.9 MG/DL (ref 8.5–10.5)
CHLORIDE SERPL-SCNC: 109 MMOL/L (ref 96–112)
CO2 SERPL-SCNC: 27 MMOL/L (ref 20–33)
CREAT SERPL-MCNC: 0.7 MG/DL (ref 0.5–1.4)
GLOBULIN SER CALC-MCNC: 3.4 G/DL (ref 1.9–3.5)
GLUCOSE SERPL-MCNC: 100 MG/DL (ref 65–99)
POTASSIUM SERPL-SCNC: 4.1 MMOL/L (ref 3.6–5.5)
PROT SERPL-MCNC: 7.2 G/DL (ref 6–8.2)
SODIUM SERPL-SCNC: 147 MMOL/L (ref 135–145)

## 2021-01-11 PROCEDURE — 80053 COMPREHEN METABOLIC PANEL: CPT

## 2021-01-11 PROCEDURE — 36415 COLL VENOUS BLD VENIPUNCTURE: CPT

## 2021-01-13 ENCOUNTER — HOSPITAL ENCOUNTER (OUTPATIENT)
Facility: MEDICAL CENTER | Age: 51
End: 2021-01-13
Attending: INTERNAL MEDICINE
Payer: COMMERCIAL

## 2021-01-13 LAB
ALBUMIN SERPL BCP-MCNC: 4 G/DL (ref 3.2–4.9)
ALBUMIN/GLOB SERPL: 1.4 G/DL
ALP SERPL-CCNC: 64 U/L (ref 30–99)
ALT SERPL-CCNC: 19 U/L (ref 2–50)
ANION GAP SERPL CALC-SCNC: 10 MMOL/L (ref 7–16)
AST SERPL-CCNC: 34 U/L (ref 12–45)
BILIRUB SERPL-MCNC: 1.6 MG/DL (ref 0.1–1.5)
BUN SERPL-MCNC: 11 MG/DL (ref 8–22)
CALCIUM SERPL-MCNC: 9.7 MG/DL (ref 8.5–10.5)
CHLORIDE SERPL-SCNC: 104 MMOL/L (ref 96–112)
CO2 SERPL-SCNC: 25 MMOL/L (ref 20–33)
CREAT SERPL-MCNC: 0.66 MG/DL (ref 0.5–1.4)
GLOBULIN SER CALC-MCNC: 2.8 G/DL (ref 1.9–3.5)
GLUCOSE SERPL-MCNC: 103 MG/DL (ref 65–99)
POTASSIUM SERPL-SCNC: 3.8 MMOL/L (ref 3.6–5.5)
PROT SERPL-MCNC: 6.8 G/DL (ref 6–8.2)
SODIUM SERPL-SCNC: 139 MMOL/L (ref 135–145)

## 2021-01-13 PROCEDURE — 80053 COMPREHEN METABOLIC PANEL: CPT

## 2021-01-29 DIAGNOSIS — M54.16 LUMBAR RADICULITIS: ICD-10-CM

## 2021-02-01 ENCOUNTER — HOSPITAL ENCOUNTER (OUTPATIENT)
Dept: LAB | Facility: MEDICAL CENTER | Age: 51
End: 2021-02-01
Attending: NURSE PRACTITIONER
Payer: COMMERCIAL

## 2021-02-01 LAB
ALBUMIN SERPL BCP-MCNC: 4.1 G/DL (ref 3.2–4.9)
ALBUMIN/GLOB SERPL: 1.3 G/DL
ALP SERPL-CCNC: 52 U/L (ref 30–99)
ALT SERPL-CCNC: 14 U/L (ref 2–50)
ANION GAP SERPL CALC-SCNC: 9 MMOL/L (ref 7–16)
AST SERPL-CCNC: 28 U/L (ref 12–45)
BILIRUB SERPL-MCNC: 1.5 MG/DL (ref 0.1–1.5)
BUN SERPL-MCNC: 12 MG/DL (ref 8–22)
CALCIUM SERPL-MCNC: 9.8 MG/DL (ref 8.5–10.5)
CHLORIDE SERPL-SCNC: 107 MMOL/L (ref 96–112)
CO2 SERPL-SCNC: 26 MMOL/L (ref 20–33)
CREAT SERPL-MCNC: 0.77 MG/DL (ref 0.5–1.4)
GLOBULIN SER CALC-MCNC: 3.2 G/DL (ref 1.9–3.5)
GLUCOSE SERPL-MCNC: 98 MG/DL (ref 65–99)
POTASSIUM SERPL-SCNC: 3.7 MMOL/L (ref 3.6–5.5)
PROT SERPL-MCNC: 7.3 G/DL (ref 6–8.2)
SODIUM SERPL-SCNC: 142 MMOL/L (ref 135–145)

## 2021-02-01 PROCEDURE — 80053 COMPREHEN METABOLIC PANEL: CPT

## 2021-02-01 PROCEDURE — 36415 COLL VENOUS BLD VENIPUNCTURE: CPT

## 2021-02-01 RX ORDER — TRAZODONE HYDROCHLORIDE 50 MG/1
50 TABLET ORAL NIGHTLY PRN
Qty: 90 TAB | Refills: 0 | Status: SHIPPED | OUTPATIENT
Start: 2021-02-01 | End: 2021-02-11 | Stop reason: SDUPTHER

## 2021-02-01 NOTE — TELEPHONE ENCOUNTER
Was the patient seen in the last year in this department? Yes    Does patient have an active prescription for medications requested? Yes    Received Request Via: Pharmacy    Hospital Outpatient Visit on 01/13/2021   Component Date Value   • Sodium 01/13/2021 139    • Potassium 01/13/2021 3.8    • Chloride 01/13/2021 104    • Co2 01/13/2021 25    • Anion Gap 01/13/2021 10.0    • Glucose 01/13/2021 103*   • Bun 01/13/2021 11    • Creatinine 01/13/2021 0.66    • Calcium 01/13/2021 9.7    • AST(SGOT) 01/13/2021 34    • ALT(SGPT) 01/13/2021 19    • Alkaline Phosphatase 01/13/2021 64    • Total Bilirubin 01/13/2021 1.6*   • Albumin 01/13/2021 4.0    • Total Protein 01/13/2021 6.8    • Globulin 01/13/2021 2.8    • A-G Ratio 01/13/2021 1.4    • GFR If  01/13/2021 >60    • GFR If Non  [facility]* 01/13/2021 >60    Hospital Outpatient Visit on 01/11/2021   Component Date Value   • Sodium 01/11/2021 147*   • Potassium 01/11/2021 4.1    • Chloride 01/11/2021 109    • Co2 01/11/2021 27    • Anion Gap 01/11/2021 11.0    • Glucose 01/11/2021 100*   • Bun 01/11/2021 13    • Creatinine 01/11/2021 0.70    • Calcium 01/11/2021 9.9    • AST(SGOT) 01/11/2021 26    • ALT(SGPT) 01/11/2021 12    • Alkaline Phosphatase 01/11/2021 57    • Total Bilirubin 01/11/2021 1.3    • Albumin 01/11/2021 3.8    • Total Protein 01/11/2021 7.2    • Globulin 01/11/2021 3.4    • A-G Ratio 01/11/2021 1.1    • GFR If  01/11/2021 >60    • GFR If Non  [facility]* 01/11/2021 >60    Hospital Outpatient Visit on 12/22/2020   Component Date Value   • Total Bilirubin 12/22/2020 1.7*   Hospital Outpatient Visit on 12/18/2020   Component Date Value   • Sodium 12/18/2020 142    • Potassium 12/18/2020 3.8    • Chloride 12/18/2020 105    • Co2 12/18/2020 26    • Anion Gap 12/18/2020 11.0    • Glucose 12/18/2020 102*   • Bun 12/18/2020 13    • Creatinine 12/18/2020 0.81    • Calcium 12/18/2020 10.0    • AST(SGOT) 12/18/2020 51*   •  ALT(SGPT) 12/18/2020 30    • Alkaline Phosphatase 12/18/2020 78    • Total Bilirubin 12/18/2020 2.0*   • Albumin 12/18/2020 4.2    • Total Protein 12/18/2020 7.3    • Globulin 12/18/2020 3.1    • A-G Ratio 12/18/2020 1.4    • GFR If  12/18/2020 >60    • GFR If Non  [facility]* 12/18/2020 >60    Hospital Outpatient Visit on 11/30/2020   Component Date Value   • Sodium 11/30/2020 141    • Potassium 11/30/2020 3.7    • Chloride 11/30/2020 104    • Co2 11/30/2020 26    • Anion Gap 11/30/2020 11.0    • Glucose 11/30/2020 108*   • Bun 11/30/2020 13    • Creatinine 11/30/2020 0.70    • Calcium 11/30/2020 10.2    • AST(SGOT) 11/30/2020 25    • ALT(SGPT) 11/30/2020 13    • Alkaline Phosphatase 11/30/2020 89    • Total Bilirubin 11/30/2020 1.8*   • Albumin 11/30/2020 4.1    • Total Protein 11/30/2020 7.6    • Globulin 11/30/2020 3.5    • A-G Ratio 11/30/2020 1.2    • GFR If  11/30/2020 >60    • GFR If Non  [facility]* 11/30/2020 >60    Hospital Outpatient Visit on 11/09/2020   Component Date Value   • Sodium 11/09/2020 141    • Potassium 11/09/2020 3.6    • Chloride 11/09/2020 100    • Co2 11/09/2020 25    • Anion Gap 11/09/2020 16.0    • Glucose 11/09/2020 108*   • Bun 11/09/2020 12    • Creatinine 11/09/2020 0.77    • Calcium 11/09/2020 10.5    • AST(SGOT) 11/09/2020 30    • ALT(SGPT) 11/09/2020 17    • Alkaline Phosphatase 11/09/2020 100*   • Total Bilirubin 11/09/2020 1.5    • Albumin 11/09/2020 4.1    • Total Protein 11/09/2020 7.6    • Globulin 11/09/2020 3.5    • A-G Ratio 11/09/2020 1.2    • GFR If  11/09/2020 >60    • GFR If Non  [facility]i* 11/09/2020 >60    Hospital Outpatient Visit on 10/19/2020   Component Date Value   • Sodium 10/19/2020 140    • Potassium 10/19/2020 3.8    • Chloride 10/19/2020 103    • Co2 10/19/2020 27    • Anion Gap 10/19/2020 10.0    • Glucose 10/19/2020 101*   • Bun 10/19/2020 16    • Creatinine 10/19/2020 0.80    • Calcium 10/19/2020  10.4    • AST(SGOT) 10/19/2020 24    • ALT(SGPT) 10/19/2020 13    • Alkaline Phosphatase 10/19/2020 99    • Total Bilirubin 10/19/2020 1.2    • Albumin 10/19/2020 4.3    • Total Protein 10/19/2020 7.9    • Globulin 10/19/2020 3.6*   • A-G Ratio 10/19/2020 1.2    • GFR If  10/19/2020 >60    • GFR If Non  [facility]i* 10/19/2020 >60    Hospital Outpatient Visit on 09/28/2020   Component Date Value   • Sodium 09/28/2020 143    • Potassium 09/28/2020 4.0    • Chloride 09/28/2020 103    • Co2 09/28/2020 25    • Anion Gap 09/28/2020 15.0    • Glucose 09/28/2020 109*   • Bun 09/28/2020 12    • Creatinine 09/28/2020 0.77    • Calcium 09/28/2020 9.9    • AST(SGOT) 09/28/2020 22    • ALT(SGPT) 09/28/2020 15    • Alkaline Phosphatase 09/28/2020 105*   • Total Bilirubin 09/28/2020 0.6    • Albumin 09/28/2020 4.1    • Total Protein 09/28/2020 7.2    • Globulin 09/28/2020 3.1    • A-G Ratio 09/28/2020 1.3    • GFR If  09/28/2020 >60    • GFR If Non  [facility]meri* 09/28/2020 >60    Hospital Outpatient Visit on 09/21/2020   Component Date Value   • Course ID 09/21/2020 C1_LBreast    • Course Start Date 09/21/2020 [id number]    • Course First Treatment D* 09/21/2020 08/12/2020    • Course Last Treatment Da* 09/21/2020 09/21/2020    • Course Elapsed Days 09/21/2020 40 @ [id number]    • Course Intent 09/21/2020 Curative    • RP ID 09/21/2020 Breast DPV    • RP Dosage Given to Date * 09/21/2020 [id number]    • RP Session Dosage Given * 09/21/2020 1.8    • RP ID 09/21/2020 L breast cp    • RP Dosage Given to Date * 09/21/2020 50.4    • RP Session Dosage Given * 09/21/2020 1.8    • RP ID 09/21/2020 L sc cp    • RP Dosage Given to Date * 09/21/2020 50.4    • RP Session Dosage Given * 09/21/2020 1.8    • RP ID 09/21/2020 SC DPV    • RP Dosage Given to Date * 09/21/2020 50.4    • RP Session Dosage Given * 09/21/2020 1.8    • Plan ID 09/21/2020 L_Breast    • Plan Name 09/21/2020 L_Breast    • Plan  Fractions Treated t* 09/21/2020 28 of 28    • Plan Prescribed Dose Per* 09/21/2020 1.8    • Plan Total Prescribed Do* 09/21/2020 5,040    • Plan ID 09/21/2020 L_Supraclav    • Plan Name 09/21/2020 L_Supraclav    • Plan Fractions Treated t* 09/21/2020 28 of 28    • Plan Prescribed Dose Per* 09/21/2020 1.8    • Plan Total Prescribed Do* 09/21/2020 5,040    • Course ID 09/21/2020 C1_LBreast    • Course Start Date 09/21/2020 [id number]    • Course End Date 09/21/2020 [id number]    • Course First Treatment D* 09/21/2020 08/12/2020    • Course Last Treatment Da* 09/21/2020 09/21/2020    • Course Elapsed Days 09/21/2020 40 @ [id number]    • Course Intent 09/21/2020 Curative    • RP ID 09/21/2020 Breast DPV    • RP Dosage Given to Date * 09/21/2020 [id number]    • RP ID 09/21/2020 L breast cp    • RP Dosage Given to Date * 09/21/2020 50.4    • RP ID 09/21/2020 L sc cp    • RP Dosage Given to Date * 09/21/2020 50.4    • RP ID 09/21/2020 SC DPV    • RP Dosage Given to Date * 09/21/2020 50.4    • Plan ID 09/21/2020 L_Breast    • Plan Name 09/21/2020 L_Breast    • Plan Fractions Treated t* 09/21/2020 28 of 28    • Plan Prescribed Dose Per* 09/21/2020 1.8    • Plan Total Prescribed Do* 09/21/2020 5,040    • Plan ID 09/21/2020 L_Supraclav    • Plan Name 09/21/2020 L_Supraclav    • Plan Fractions Treated t* 09/21/2020 28 of 28    • Plan Prescribed Dose Per* 09/21/2020 1.8    • Plan Total Prescribed Do* 09/21/2020 5,040    Hospital Outpatient Visit on 09/18/2020   Component Date Value   • Course ID 09/18/2020 C1_LBreast    • Course Start Date 09/18/2020 [id number]    • Course First Treatment D* 09/18/2020 08/12/2020    • Course Last Treatment Da* 09/18/2020 09/18/2020    • Course Elapsed Days 09/18/2020 37 @ [id number]    • Course Intent 09/18/2020 Curative    • RP ID 09/18/2020 Breast DPV    • RP Dosage Given to Date * 09/18/2020 [id number]    • RP Session Dosage Given * 09/18/2020 1.8    • RP ID  09/18/2020 L breast cp    • RP Dosage Given to Date * 09/18/2020 48.6    • RP Session Dosage Given * 09/18/2020 1.8    • Plan ID 09/18/2020 L_Breast    • Plan Name 09/18/2020 L_Breast    • Plan Fractions Treated t* 09/18/2020 27 of 28    • Plan Prescribed Dose Per* 09/18/2020 1.8    • Plan Total Prescribed Do* 09/18/2020 5,040    • Course ID 09/18/2020 C1_LBreast    • Course Start Date 09/18/2020 [id number]    • Course First Treatment D* 09/18/2020 08/12/2020    • Course Last Treatment Da* 09/18/2020 09/18/2020    • Course Elapsed Days 09/18/2020 37 @ [id number]    • Course Intent 09/18/2020 Curative    • RP ID 09/18/2020 L sc cp    • RP Dosage Given to Date * 09/18/2020 48.6    • RP Session Dosage Given * 09/18/2020 1.8    • RP ID 09/18/2020 SC DPV    • RP Dosage Given to Date * 09/18/2020 48.6    • RP Session Dosage Given * 09/18/2020 1.8    • Plan ID 09/18/2020 L_Supraclav    • Plan Name 09/18/2020 L_Supraclav    • Plan Fractions Treated t* 09/18/2020 27 of 28    • Plan Prescribed Dose Per* 09/18/2020 1.8    • Plan Total Prescribed Do* 09/18/2020 5,040    There may be more visits with results that are not included.   ]

## 2021-02-02 RX ORDER — GABAPENTIN 300 MG/1
300 CAPSULE ORAL 3 TIMES DAILY
Qty: 90 CAP | Refills: 5 | Status: SHIPPED | OUTPATIENT
Start: 2021-02-02 | End: 2021-02-10 | Stop reason: SDUPTHER

## 2021-02-02 RX ORDER — GABAPENTIN 300 MG/1
600 CAPSULE ORAL 3 TIMES DAILY
Qty: 180 CAP | Refills: 1 | OUTPATIENT
Start: 2021-02-02

## 2021-02-10 RX ORDER — GABAPENTIN 300 MG/1
300 CAPSULE ORAL 3 TIMES DAILY
Qty: 90 CAPSULE | Refills: 5 | Status: SHIPPED | OUTPATIENT
Start: 2021-02-10 | End: 2021-03-31

## 2021-02-10 NOTE — TELEPHONE ENCOUNTER
----- Message from Lori Mendes sent at 2/10/2021  8:01 AM PST -----  Regarding: RE: Med clarification  Contact: [phone]  My pharmacy still does not have this prescription.  I haven't had my gabapentin since Monday morning.  Thank you, Lori Mendes

## 2021-02-11 RX ORDER — TRAZODONE HYDROCHLORIDE 50 MG/1
50 TABLET ORAL NIGHTLY PRN
Qty: 90 TABLET | Refills: 2 | Status: SHIPPED | OUTPATIENT
Start: 2021-02-11 | End: 2021-09-22 | Stop reason: SDUPTHER

## 2021-02-11 NOTE — TELEPHONE ENCOUNTER
Received request via: Patient    Was the patient seen in the last year in this department? Yes    Does the patient have an active prescription (recently filled or refills available) for medication(s) requested? No     Requested Prescriptions     Pending Prescriptions Disp Refills   • traZODone (DESYREL) 50 MG Tab 90 tablet 0     Sig: Take 1 tablet by mouth at bedtime as needed for Sleep.

## 2021-02-17 ENCOUNTER — APPOINTMENT (OUTPATIENT)
Dept: RADIOLOGY | Facility: IMAGING CENTER | Age: 51
End: 2021-02-17
Attending: INTERNAL MEDICINE
Payer: COMMERCIAL

## 2021-02-17 ENCOUNTER — APPOINTMENT (OUTPATIENT)
Dept: URGENT CARE | Facility: PHYSICIAN GROUP | Age: 51
End: 2021-02-17

## 2021-02-17 DIAGNOSIS — C50.812 MALIGNANT NEOPLASM OF OVERLAPPING SITES OF LEFT FEMALE BREAST, UNSPECIFIED ESTROGEN RECEPTOR STATUS (HCC): ICD-10-CM

## 2021-02-17 PROCEDURE — 73030 X-RAY EXAM OF SHOULDER: CPT | Mod: TC,FY,RT | Performed by: PHYSICIAN ASSISTANT

## 2021-02-22 ENCOUNTER — HOSPITAL ENCOUNTER (OUTPATIENT)
Dept: LAB | Facility: MEDICAL CENTER | Age: 51
End: 2021-02-22
Attending: NURSE PRACTITIONER
Payer: COMMERCIAL

## 2021-02-22 LAB
ALBUMIN SERPL BCP-MCNC: 4 G/DL (ref 3.2–4.9)
ALBUMIN/GLOB SERPL: 1.1 G/DL
ALP SERPL-CCNC: 51 U/L (ref 30–99)
ALT SERPL-CCNC: 12 U/L (ref 2–50)
ANION GAP SERPL CALC-SCNC: 10 MMOL/L (ref 7–16)
AST SERPL-CCNC: 29 U/L (ref 12–45)
BILIRUB SERPL-MCNC: 1.4 MG/DL (ref 0.1–1.5)
BUN SERPL-MCNC: 13 MG/DL (ref 8–22)
CALCIUM SERPL-MCNC: 10.3 MG/DL (ref 8.5–10.5)
CHLORIDE SERPL-SCNC: 106 MMOL/L (ref 96–112)
CO2 SERPL-SCNC: 26 MMOL/L (ref 20–33)
CREAT SERPL-MCNC: 0.76 MG/DL (ref 0.5–1.4)
GLOBULIN SER CALC-MCNC: 3.5 G/DL (ref 1.9–3.5)
GLUCOSE SERPL-MCNC: 101 MG/DL (ref 65–99)
POTASSIUM SERPL-SCNC: 3.8 MMOL/L (ref 3.6–5.5)
PROT SERPL-MCNC: 7.5 G/DL (ref 6–8.2)
SODIUM SERPL-SCNC: 142 MMOL/L (ref 135–145)

## 2021-02-22 PROCEDURE — 36415 COLL VENOUS BLD VENIPUNCTURE: CPT

## 2021-02-22 PROCEDURE — 80053 COMPREHEN METABOLIC PANEL: CPT

## 2021-02-24 ENCOUNTER — HOSPITAL ENCOUNTER (OUTPATIENT)
Facility: MEDICAL CENTER | Age: 51
End: 2021-02-24
Attending: INTERNAL MEDICINE
Payer: COMMERCIAL

## 2021-02-24 LAB
25(OH)D3 SERPL-MCNC: 46 NG/ML (ref 30–100)
ESTRADIOL SERPL-MCNC: <5 PG/ML
FSH SERPL-ACNC: 37.1 MIU/ML

## 2021-02-24 PROCEDURE — 83001 ASSAY OF GONADOTROPIN (FSH): CPT

## 2021-02-24 PROCEDURE — 82670 ASSAY OF TOTAL ESTRADIOL: CPT

## 2021-02-24 PROCEDURE — 82306 VITAMIN D 25 HYDROXY: CPT

## 2021-03-15 ENCOUNTER — HOSPITAL ENCOUNTER (OUTPATIENT)
Dept: LAB | Facility: MEDICAL CENTER | Age: 51
End: 2021-03-15
Attending: NURSE PRACTITIONER
Payer: COMMERCIAL

## 2021-03-15 PROCEDURE — 80053 COMPREHEN METABOLIC PANEL: CPT

## 2021-03-15 PROCEDURE — 36415 COLL VENOUS BLD VENIPUNCTURE: CPT

## 2021-03-16 LAB
ALBUMIN SERPL BCP-MCNC: 4.1 G/DL (ref 3.2–4.9)
ALBUMIN/GLOB SERPL: 1.2 G/DL
ALP SERPL-CCNC: 60 U/L (ref 30–99)
ALT SERPL-CCNC: 13 U/L (ref 2–50)
ANION GAP SERPL CALC-SCNC: 10 MMOL/L (ref 7–16)
AST SERPL-CCNC: 26 U/L (ref 12–45)
BILIRUB SERPL-MCNC: 1.4 MG/DL (ref 0.1–1.5)
BUN SERPL-MCNC: 16 MG/DL (ref 8–22)
CALCIUM SERPL-MCNC: 10.1 MG/DL (ref 8.5–10.5)
CHLORIDE SERPL-SCNC: 106 MMOL/L (ref 96–112)
CO2 SERPL-SCNC: 25 MMOL/L (ref 20–33)
CREAT SERPL-MCNC: 0.7 MG/DL (ref 0.5–1.4)
GLOBULIN SER CALC-MCNC: 3.3 G/DL (ref 1.9–3.5)
GLUCOSE SERPL-MCNC: 101 MG/DL (ref 65–99)
POTASSIUM SERPL-SCNC: 3.8 MMOL/L (ref 3.6–5.5)
PROT SERPL-MCNC: 7.4 G/DL (ref 6–8.2)
SODIUM SERPL-SCNC: 141 MMOL/L (ref 135–145)

## 2021-03-29 ENCOUNTER — OFFICE VISIT (OUTPATIENT)
Dept: URGENT CARE | Facility: PHYSICIAN GROUP | Age: 51
End: 2021-03-29
Payer: COMMERCIAL

## 2021-03-29 ENCOUNTER — HOSPITAL ENCOUNTER (OUTPATIENT)
Facility: MEDICAL CENTER | Age: 51
End: 2021-03-29
Attending: FAMILY MEDICINE
Payer: COMMERCIAL

## 2021-03-29 VITALS
BODY MASS INDEX: 37.95 KG/M2 | TEMPERATURE: 97.6 F | WEIGHT: 227.8 LBS | DIASTOLIC BLOOD PRESSURE: 74 MMHG | OXYGEN SATURATION: 88 % | HEIGHT: 65 IN | HEART RATE: 92 BPM | SYSTOLIC BLOOD PRESSURE: 118 MMHG | RESPIRATION RATE: 16 BRPM

## 2021-03-29 DIAGNOSIS — N39.0 ACUTE URINARY TRACT INFECTION: ICD-10-CM

## 2021-03-29 LAB
APPEARANCE UR: CLEAR
BILIRUB UR STRIP-MCNC: NEGATIVE MG/DL
COLOR UR AUTO: YELLOW
GLUCOSE UR STRIP.AUTO-MCNC: NEGATIVE MG/DL
KETONES UR STRIP.AUTO-MCNC: NEGATIVE MG/DL
LEUKOCYTE ESTERASE UR QL STRIP.AUTO: ABNORMAL
NITRITE UR QL STRIP.AUTO: POSITIVE
PH UR STRIP.AUTO: 5 [PH] (ref 5–8)
PROT UR QL STRIP: 100 MG/DL
RBC UR QL AUTO: ABNORMAL
SP GR UR STRIP.AUTO: 1.03
UROBILINOGEN UR STRIP-MCNC: 0.2 MG/DL

## 2021-03-29 PROCEDURE — 87086 URINE CULTURE/COLONY COUNT: CPT

## 2021-03-29 PROCEDURE — 99214 OFFICE O/P EST MOD 30 MIN: CPT | Performed by: FAMILY MEDICINE

## 2021-03-29 PROCEDURE — 81002 URINALYSIS NONAUTO W/O SCOPE: CPT | Performed by: FAMILY MEDICINE

## 2021-03-29 PROCEDURE — 87077 CULTURE AEROBIC IDENTIFY: CPT | Mod: 91

## 2021-03-29 PROCEDURE — 87186 SC STD MICRODIL/AGAR DIL: CPT

## 2021-03-29 RX ORDER — TAMOXIFEN CITRATE 20 MG/1
20 TABLET ORAL 2 TIMES DAILY
COMMUNITY

## 2021-03-29 RX ORDER — CEFDINIR 300 MG/1
CAPSULE ORAL
Qty: 14 CAPSULE | Refills: 0 | Status: SHIPPED | OUTPATIENT
Start: 2021-03-29 | End: 2021-04-05

## 2021-03-29 ASSESSMENT — FIBROSIS 4 INDEX: FIB4 SCORE: 2.08

## 2021-03-29 NOTE — PROGRESS NOTES
Chief Complaint:    Chief Complaint   Patient presents with   • Painful Urination     x 5 days   • Urinary Frequency       History of Present Illness:    Symptoms since 3/25/21. Has dysuria, urinary frequency, and urinary urgency. No hematuria. Last UTI was a long time ago.      Past Medical History:    Past Medical History:   Diagnosis Date   • Anxiety disorder 1/9/2012   • Bowel habit changes     diarrhea    • Breast cancer (HCC)    • Cancer (HCC) 12/20/2019    breast    • GERD (gastroesophageal reflux disease)     per PT has been DX w/ paralytic stomach   • Heart burn    • Lumbar radiculitis 2/7/2012   • Malig neoplasm of upper-outer quadrant of unsp female breast (HCC) 12/19/2019   • Obesity (BMI 30-39.9) 1/4/2013   • S/P laparoscopic cholecystectomy 10/9/14     Past Surgical History:    Past Surgical History:   Procedure Laterality Date   • NM REDUCTION OF BREAST Right 6/11/2020    Procedure: MAMMOPLASTY, REDUCTION- FOR SYMMETRY;  Surgeon: Dave Chung M.D.;  Location: [facility];  Service: Plastics   • PARTIAL MASTECTOMY Left 6/11/2020    Procedure: MASTECTOMY, PARTIAL-DELL LOCALIZED;  Surgeon: Yaima Najera M.D.;  Location: [facility];  Service: General   • NODE BIOPSY SENTINEL Left 6/11/2020    Procedure: BIOPSY, LYMPH NODE, SENTINEL;  Surgeon: Yaima Najera M.D.;  Location: [facility];  Service: General   • LYMPH NODE EXCISION Left 6/11/2020    Procedure: EXCISION, LYMPH NODE-DELL LOCALIZED AXILLARY;  Surgeon: Yaiam Najera M.D.;  Location: [facility];  Service: General   • BREAST RECONSTRUCTION Left 6/11/2020    Procedure: RECONSTRUCTION, BREAST- WITH LOCAL TISSUE REARRANGEMENT;  Surgeon: Dave Chung M.D.;  Location: [facility];  Service: Plastics   • AXILLARY NODE DISSECTION Left 6/11/2020    Procedure: LYMPHADENECTOMY, AXILLARY  LYMPH NODE DISSECTION;  Surgeon: Yaima Najera M.D.;  Location: [facility];   Service: General   • CATH PLACEMENT Right 1/14/2020    Procedure: INSERTION, CATHETER - PORT;  Surgeon: Yaima Najera M.D.;  Location: SURGERY SAME DAY [facility];  Service: General   • TRICIA BY LAPAROSCOPY  10/9/2014    Performed by Mal Brantley D.O. at SURGERY [facility]   • BREAST BIOPSY      benign left bx in 1993     Social History:    Social History     Socioeconomic History   • Marital status:      Spouse name: Not on file   • Number of children: Not on file   • Years of education: Not on file   • Highest education level: Associate degree: occupational, technical, or vocational program   Occupational History   • Occupation: self employed.      Employer: OTHER   Tobacco Use   • Smoking status: Never Smoker   • Smokeless tobacco: Former User     Types: Chew   • Tobacco comment: unable to quantify chewing   Substance and Sexual Activity   • Alcohol use: No     Alcohol/week: 0.0 oz   • Drug use: Yes     Types: Inhaled, Oral, Marijuana     Comment: marijuana daily - last used 6/10/20   • Sexual activity: Never     Partners: Male   Other Topics Concern   •  Service Not Asked   • Blood Transfusions Not Asked   • Caffeine Concern Not Asked   • Occupational Exposure Not Asked   • Hobby Hazards Not Asked   • Sleep Concern Not Asked   • Stress Concern Not Asked   • Weight Concern Not Asked   • Special Diet Not Asked   • Back Care Not Asked   • Exercise Yes     Comment: daily workouts at the gym   • Bike Helmet No   • Seat Belt Yes   • Self-Exams Yes   Social History Narrative    Single, has son.      Social Determinants of Health     Financial Resource Strain: Medium Risk   • Difficulty of Paying Living Expenses: Somewhat hard   Food Insecurity: No Food Insecurity   • Worried About Running Out of Food in the Last Year: Never true   • Ran Out of Food in the Last Year: Never true   Transportation Needs: No Transportation Needs   • Lack of Transportation (Medical): No   • Lack of Transportation  (Non-Medical): No   Physical Activity: Inactive   • Days of Exercise per Week: 0 days   • Minutes of Exercise per Session: 0 min   Stress: Stress Concern Present   • Feeling of Stress : Very much   Social Connections: Moderately Isolated   • Frequency of Communication with Friends and Family: More than three times a week   • Frequency of Social Gatherings with Friends and Family: More than three times a week   • Attends [employer] Services: Never   • Active Member of Clubs or Organizations: No   • Attends Club or Organization Meetings: Never   • Marital Status:    Intimate Partner Violence:    • Fear of Current or Ex-Partner:    • Emotionally Abused:    • Physically Abused:    • Sexually Abused:      Family History:    Family History   Problem Relation Age of Onset   • Hypertension Father    • Cancer Maternal Grandmother 50        breast cancer, cervical cancer and lung cancer   • Breast Cancer Maternal Grandmother    • Cancer Paternal Grandmother 60        breast cancer   • Breast Cancer Paternal Grandmother    • Diabetes Paternal Grandmother    • Osteoporosis Paternal Grandmother      Medications:    Current Outpatient Medications on File Prior to Visit   Medication Sig Dispense Refill   • tamoxifen (NOLVADEX) 20 MG tablet Take 20 mg by mouth 2 times a day.     • traZODone (DESYREL) 50 MG Tab Take 1 tablet by mouth at bedtime as needed for Sleep. 90 tablet 2   • gabapentin (NEURONTIN) 300 MG Cap Take 1 capsule by mouth 3 times a day. 90 capsule 5   • loperamide (IMODIUM) 2 MG Cap Take 2 mg by mouth 4 times a day as needed for Diarrhea.     • prochlorperazine (COMPAZINE) 10 MG Tab Take 10 mg by mouth every 6 hours as needed for Nausea/Vomiting.     • ondansetron (ZOFRAN) 4 MG Tab tablet Take 4 mg by mouth every 8 hours as needed for Nausea/Vomiting.     • lidocaine-prilocaine (EMLA) 2.5-2.5 % Cream Apply 1 g to affected area(s) 1 time daily as needed (Port Access).     • diphenoxylate-atropine (LOMOTIL)  "2.5-0.025 MG Tab Take 1 Tab by mouth 3 times a day as needed for Diarrhea.     • CALCIUM PO Take 1 Dose by mouth every morning. Unknown OTC Strength.     • Magnesium 500 MG Cap Take 500 mg by mouth every morning.     • VITAMIN D PO Take 1 Dose by mouth every morning. Unknown OTC Strength.     • POTASSIUM PO Take 1 Dose by mouth every morning. Unknown OTC Strength.       No current facility-administered medications on file prior to visit.     Allergies:    No Known Allergies      Vitals:    Vitals:    03/29/21 1129   BP: 118/74   Pulse: 92   Resp: 16   Temp: 36.4 °C (97.6 °F)   TempSrc: Temporal   SpO2: 88%   Weight: 103 kg (227 lb 12.8 oz)   Height: 1.651 m (5' 5\")       Physical Exam:    Constitutional: Vital signs reviewed. Appears well-developed and well-nourished. No acute distress.   Eyes: Sclera white, conjunctivae clear.   ENT: External ears normal. Hearing normal.  Neck: Neck supple.   Pulmonary/Chest: Respirations non-labored.   Musculoskeletal: Normal gait. No muscular atrophy or weakness.  Neurological: Alert and oriented to person, place, and time. Muscle tone normal. Coordination normal.   Skin: No rashes or lesions. Warm, dry, normal turgor.  Psychiatric: Normal mood and affect. Behavior is normal. Judgment and thought content normal.       Diagnostics:    POCT Urinalysis  Order: [id number]  Status:  Final result   Visible to patient:  Catherine (scheduled for 3/30/2021  9:38 AM) Next appt:  04/05/2021 at 10:20 AM in Radiology ([address]) Dx:  Acute urinary tract infection   Ref Range & Units 11:37 AM 3 yr ago   POC Color Negative yellow  Yellow    POC Appearance Negative clear  Clear    POC Leukocyte Esterase Negative small  Negative    POC Nitrites Negative positive  Negative    POC Urobiligen Negative (0.2) mg/dL 0.2  Negative    POC Protein Negative mg/dL 100  Negative    POC Urine PH 5.0 - 8.0 5.0  6.5    POC Blood Negative moderate  Large    POC Specific Gravity <1.005 - >1.030 1.030  " 1.015    POC Ketones Negative mg/dL negative  Small    POC Bilirubin Negative mg/dL negative  Negative    POC Glucose Negative mg/dL negative  Negative    Resulting Agency  [facility]          Specimen Collected: 03/29/21 11:37 AM Last Resulted: 03/29/21 11:38 AM             Assessment / Plan:    1. Acute urinary tract infection  - POCT Urinalysis  - cefdinir (OMNICEF) 300 MG Cap; 1 CAP BY MOUTH TWICE A DAY X 7 DAYS.  Dispense: 14 capsule; Refill: 0  - URINE CULTURE(NEW); Future      Discussed with her DDX, management options, and risks, benefits, and alternatives to treatment plan agreed upon.    Agreeable to medication prescribed and send urine for culture.    [facility]    She will follow-up if needed while waiting for urine culture result.

## 2021-03-31 ENCOUNTER — PATIENT MESSAGE (OUTPATIENT)
Dept: MEDICAL GROUP | Facility: MEDICAL CENTER | Age: 51
End: 2021-03-31

## 2021-03-31 RX ORDER — GABAPENTIN 300 MG/1
300 CAPSULE ORAL 3 TIMES DAILY
Qty: 90 CAPSULE | Refills: 5 | Status: CANCELLED
Start: 2021-03-31

## 2021-03-31 RX ORDER — GABAPENTIN 600 MG/1
600 TABLET ORAL 2 TIMES DAILY
Qty: 180 TABLET | Refills: 0 | Status: SHIPPED | OUTPATIENT
Start: 2021-03-31 | End: 2021-06-28

## 2021-04-01 LAB
BACTERIA UR CULT: ABNORMAL
BACTERIA UR CULT: ABNORMAL
SIGNIFICANT IND 70042: ABNORMAL
SITE SITE: ABNORMAL
SOURCE SOURCE: ABNORMAL

## 2021-04-05 ENCOUNTER — HOSPITAL ENCOUNTER (OUTPATIENT)
Dept: RADIOLOGY | Facility: MEDICAL CENTER | Age: 51
End: 2021-04-05
Attending: INTERNAL MEDICINE
Payer: COMMERCIAL

## 2021-04-05 ENCOUNTER — HOSPITAL ENCOUNTER (OUTPATIENT)
Dept: LAB | Facility: MEDICAL CENTER | Age: 51
End: 2021-04-05
Attending: NURSE PRACTITIONER
Payer: COMMERCIAL

## 2021-04-05 DIAGNOSIS — C50.812 MALIGNANT NEOPLASM OF OVERLAPPING SITES OF LEFT FEMALE BREAST, UNSPECIFIED ESTROGEN RECEPTOR STATUS (HCC): ICD-10-CM

## 2021-04-05 PROCEDURE — G0279 TOMOSYNTHESIS, MAMMO: HCPCS | Mod: RT

## 2021-04-05 PROCEDURE — 80053 COMPREHEN METABOLIC PANEL: CPT

## 2021-04-05 PROCEDURE — 36415 COLL VENOUS BLD VENIPUNCTURE: CPT

## 2021-04-06 LAB
ALBUMIN SERPL BCP-MCNC: 3.7 G/DL (ref 3.2–4.9)
ALBUMIN/GLOB SERPL: 1.1 G/DL
ALP SERPL-CCNC: 48 U/L (ref 30–99)
ALT SERPL-CCNC: 17 U/L (ref 2–50)
ANION GAP SERPL CALC-SCNC: 8 MMOL/L (ref 7–16)
AST SERPL-CCNC: 30 U/L (ref 12–45)
BILIRUB SERPL-MCNC: 1.8 MG/DL (ref 0.1–1.5)
BUN SERPL-MCNC: 11 MG/DL (ref 8–22)
CALCIUM SERPL-MCNC: 9.3 MG/DL (ref 8.5–10.5)
CHLORIDE SERPL-SCNC: 101 MMOL/L (ref 96–112)
CO2 SERPL-SCNC: 27 MMOL/L (ref 20–33)
CREAT SERPL-MCNC: 0.79 MG/DL (ref 0.5–1.4)
GLOBULIN SER CALC-MCNC: 3.3 G/DL (ref 1.9–3.5)
GLUCOSE SERPL-MCNC: 92 MG/DL (ref 65–99)
POTASSIUM SERPL-SCNC: 3.4 MMOL/L (ref 3.6–5.5)
PROT SERPL-MCNC: 7 G/DL (ref 6–8.2)
SODIUM SERPL-SCNC: 136 MMOL/L (ref 135–145)

## 2021-04-13 ENCOUNTER — TELEMEDICINE (OUTPATIENT)
Dept: MEDICAL GROUP | Facility: MEDICAL CENTER | Age: 51
End: 2021-04-13
Payer: COMMERCIAL

## 2021-04-13 VITALS — BODY MASS INDEX: 37.82 KG/M2 | WEIGHT: 227 LBS | HEIGHT: 65 IN

## 2021-04-13 DIAGNOSIS — N39.0 URINARY TRACT INFECTION WITHOUT HEMATURIA, SITE UNSPECIFIED: ICD-10-CM

## 2021-04-13 PROCEDURE — 99213 OFFICE O/P EST LOW 20 MIN: CPT | Mod: 95,CR | Performed by: NURSE PRACTITIONER

## 2021-04-13 RX ORDER — NITROFURANTOIN 25; 75 MG/1; MG/1
100 CAPSULE ORAL 2 TIMES DAILY
Qty: 10 CAPSULE | Refills: 0 | Status: SHIPPED | OUTPATIENT
Start: 2021-04-13 | End: 2021-12-01

## 2021-04-13 ASSESSMENT — FIBROSIS 4 INDEX: FIB4 SCORE: 2.1

## 2021-04-13 NOTE — PROGRESS NOTES
Telemedicine: Established Patient   This evaluation was conducted via [employer] using secure and encrypted videoconferencing technology. The patient was in a private location in the state of [place].    The patient's identity was confirmed and verbal consent was obtained for this virtual visit.    Subjective:   CC:   Chief Complaint   Patient presents with   • Advice Only     UTI symptoms,        Lori Mendes is a 50 y.o. female established patient presenting today for virtual visit with concerns of dysuria.  She was seen for this in urgent care 3/29/2021.  Urine culture at that time grew E. coli with no antibiotic resistance.  She had been treated with a 7-day course of cefdinir and reports that symptoms resolved while taking the antibiotic although it did cause diarrhea.  Shortly after completing the antibiotic course her dysuria returned.  She is experiencing ongoing urinary frequency and urgency.  She is on tamoxifen and has completed chemotherapy for breast cancer    No fever, chills, hematuria, nausea, vomiting        ROS      No Known Allergies    Current medicines (including changes today)  Current Outpatient Medications   Medication Sig Dispense Refill   • nitrofurantoin (MACROBID) 100 MG Cap Take 1 capsule by mouth 2 times a day. 10 capsule 0   • gabapentin (NEURONTIN) 600 MG tablet Take 1 tablet by mouth 2 times a day. 180 tablet 0   • tamoxifen (NOLVADEX) 20 MG tablet Take 20 mg by mouth 2 times a day.     • traZODone (DESYREL) 50 MG Tab Take 1 tablet by mouth at bedtime as needed for Sleep. 90 tablet 2   • loperamide (IMODIUM) 2 MG Cap Take 2 mg by mouth 4 times a day as needed for Diarrhea.     • prochlorperazine (COMPAZINE) 10 MG Tab Take 10 mg by mouth every 6 hours as needed for Nausea/Vomiting.     • ondansetron (ZOFRAN) 4 MG Tab tablet Take 4 mg by mouth every 8 hours as needed for Nausea/Vomiting.     • lidocaine-prilocaine (EMLA) 2.5-2.5 % Cream Apply 1 g to affected area(s) 1 time daily  as needed (Port Access).     • diphenoxylate-atropine (LOMOTIL) 2.5-0.025 MG Tab Take 1 Tab by mouth 3 times a day as needed for Diarrhea.     • CALCIUM PO Take 1 Dose by mouth every morning. Unknown OTC Strength.     • Magnesium 500 MG Cap Take 500 mg by mouth every morning.     • VITAMIN D PO Take 1 Dose by mouth every morning. Unknown OTC Strength.     • POTASSIUM PO Take 1 Dose by mouth every morning. Unknown OTC Strength.       No current facility-administered medications for this visit.       Patient Active Problem List    Diagnosis Date Noted   • Hyponatremia 07/11/2020     Priority: Medium   • Hypokalemia 07/11/2020     Priority: Medium   • Malignant neoplasm of left breast in female, estrogen receptor positive (HCC) 12/19/2019     Priority: Medium   • Anemia 01/08/2016     Priority: Medium   • Anxiety disorder 01/09/2012     Priority: Low   • Encounter for birth control pills maintenance 12/05/2019   • Diarrhea in adult patient 12/05/2019   • Insomnia 08/09/2017   • Dumping syndrome 08/09/2017   • Mild episode of recurrent major depressive disorder ([facility]) 09/01/2015   • S/P cholecystectomy 10/09/2014   • Class 3 severe obesity due to excess calories without serious comorbidity with body mass index (BMI) of 40.0 to 44.9 in adult ([facility]) 01/04/2013   • Vitamin D deficiency 02/07/2012   • Lumbar radiculitis 02/07/2012       Family History   Problem Relation Age of Onset   • Hypertension Father    • Cancer Maternal Grandmother 50        breast cancer, cervical cancer and lung cancer   • Breast Cancer Maternal Grandmother    • Cancer Paternal Grandmother 60        breast cancer   • Breast Cancer Paternal Grandmother    • Diabetes Paternal Grandmother    • Osteoporosis Paternal Grandmother        She  has a past medical history of Anxiety disorder (1/9/2012), Bowel habit changes, Breast cancer ([facility]), Cancer ([facility]) (12/20/2019), GERD (gastroesophageal reflux disease), Heart burn, Lumbar radiculitis (2/7/2012), Malig  "neoplasm of upper-outer quadrant of unsp female breast (HCC) (12/19/2019), Obesity (BMI 30-39.9) (1/4/2013), and S/P laparoscopic cholecystectomy (10/9/14).  She  has a past surgical history that includes breast biopsy; madhu by laparoscopy (10/9/2014); cath placement (Right, 1/14/2020); pr breast reduction (Right, 6/11/2020); partial mastectomy (Left, 6/11/2020); node biopsy sentinel (Left, 6/11/2020); lymph node excision (Left, 6/11/2020); breast reconstruction (Left, 6/11/2020); and axillary node dissection (Left, 6/11/2020).       Objective:   Ht 1.651 m (5' 5\")   Wt 103 kg (227 lb)   BMI 37.77 kg/m²     Physical Exam    Assessment and Plan:   The following treatment plan was discussed:     1. Urinary tract infection without hematuria, site unspecified  - nitrofurantoin (MACROBID) 100 MG Cap; Take 1 capsule by mouth 2 times a day.  Dispense: 10 capsule; Refill: 0    Recent urine culture reviewed.  Symptoms resolved while on antibiotic but returned shortly thereafter.  We will treat again with course of Macrobid, risks and side effects reviewed.  Encouraged increase fluid consumption, cranberry juice.  Dietary modification for diarrhea discussed.  She will follow-up if not improved      Follow-up: As needed           "

## 2021-08-02 ENCOUNTER — HOSPITAL ENCOUNTER (OUTPATIENT)
Dept: LAB | Facility: MEDICAL CENTER | Age: 51
End: 2021-08-02
Attending: INTERNAL MEDICINE
Payer: COMMERCIAL

## 2021-08-02 LAB
ANISOCYTOSIS BLD QL SMEAR: ABNORMAL
BASOPHILS # BLD AUTO: 0.8 % (ref 0–1.8)
BASOPHILS # BLD: 0.04 K/UL (ref 0–0.12)
BURR CELLS BLD QL SMEAR: NORMAL
COMMENT 1642: NORMAL
EOSINOPHIL # BLD AUTO: 0.14 K/UL (ref 0–0.51)
EOSINOPHIL NFR BLD: 2.7 % (ref 0–6.9)
ERYTHROCYTE [DISTWIDTH] IN BLOOD BY AUTOMATED COUNT: 78.6 FL (ref 35.9–50)
HCT VFR BLD AUTO: 37 % (ref 37–47)
HGB BLD-MCNC: 11.6 G/DL (ref 12–16)
IMM GRANULOCYTES # BLD AUTO: 0.01 K/UL (ref 0–0.11)
IMM GRANULOCYTES NFR BLD AUTO: 0.2 % (ref 0–0.9)
IRON SATN MFR SERPL: 35 % (ref 15–55)
IRON SERPL-MCNC: 101 UG/DL (ref 40–170)
LYMPHOCYTES # BLD AUTO: 2.61 K/UL (ref 1–4.8)
LYMPHOCYTES NFR BLD: 51 % (ref 22–41)
MACROCYTES BLD QL SMEAR: ABNORMAL
MCH RBC QN AUTO: 27.8 PG (ref 27–33)
MCHC RBC AUTO-ENTMCNC: 31.4 G/DL (ref 33.6–35)
MCV RBC AUTO: 88.7 FL (ref 81.4–97.8)
MICROCYTES BLD QL SMEAR: ABNORMAL
MONOCYTES # BLD AUTO: 0.37 K/UL (ref 0–0.85)
MONOCYTES NFR BLD AUTO: 7.2 % (ref 0–13.4)
MORPHOLOGY BLD-IMP: NORMAL
NEUTROPHILS # BLD AUTO: 1.95 K/UL (ref 2–7.15)
NEUTROPHILS NFR BLD: 38.1 % (ref 44–72)
NRBC # BLD AUTO: 0 K/UL
NRBC BLD-RTO: 0 /100 WBC
OVALOCYTES BLD QL SMEAR: NORMAL
PLATELET # BLD AUTO: 119 K/UL (ref 164–446)
PLATELET BLD QL SMEAR: NORMAL
PMV BLD AUTO: 10.5 FL (ref 9–12.9)
POIKILOCYTOSIS BLD QL SMEAR: NORMAL
RBC # BLD AUTO: 4.17 M/UL (ref 4.2–5.4)
RBC BLD AUTO: PRESENT
TIBC SERPL-MCNC: 287 UG/DL (ref 250–450)
UIBC SERPL-MCNC: 186 UG/DL (ref 110–370)
WBC # BLD AUTO: 5.1 K/UL (ref 4.8–10.8)

## 2021-08-02 PROCEDURE — 85025 COMPLETE CBC W/AUTO DIFF WBC: CPT

## 2021-08-02 PROCEDURE — 83540 ASSAY OF IRON: CPT

## 2021-08-02 PROCEDURE — 83550 IRON BINDING TEST: CPT

## 2021-08-02 PROCEDURE — 36415 COLL VENOUS BLD VENIPUNCTURE: CPT

## 2021-08-06 ENCOUNTER — HOSPITAL ENCOUNTER (OUTPATIENT)
Facility: MEDICAL CENTER | Age: 51
End: 2021-08-06
Attending: NURSE PRACTITIONER
Payer: COMMERCIAL

## 2021-08-06 ENCOUNTER — OFFICE VISIT (OUTPATIENT)
Dept: URGENT CARE | Facility: CLINIC | Age: 51
End: 2021-08-06
Payer: COMMERCIAL

## 2021-08-06 VITALS
WEIGHT: 217 LBS | BODY MASS INDEX: 36.15 KG/M2 | HEIGHT: 65 IN | OXYGEN SATURATION: 97 % | RESPIRATION RATE: 18 BRPM | SYSTOLIC BLOOD PRESSURE: 92 MMHG | DIASTOLIC BLOOD PRESSURE: 60 MMHG | HEART RATE: 76 BPM | TEMPERATURE: 98 F

## 2021-08-06 DIAGNOSIS — R09.81 NASAL CONGESTION: ICD-10-CM

## 2021-08-06 DIAGNOSIS — R68.83 CHILLS: ICD-10-CM

## 2021-08-06 DIAGNOSIS — R05.9 COUGH: ICD-10-CM

## 2021-08-06 DIAGNOSIS — R11.0 NAUSEA: ICD-10-CM

## 2021-08-06 DIAGNOSIS — R53.83 FATIGUE, UNSPECIFIED TYPE: ICD-10-CM

## 2021-08-06 DIAGNOSIS — R07.89 CHEST TIGHTNESS: ICD-10-CM

## 2021-08-06 DIAGNOSIS — R61 NIGHT SWEATS: ICD-10-CM

## 2021-08-06 DIAGNOSIS — Z85.3 HISTORY OF BREAST CANCER: ICD-10-CM

## 2021-08-06 PROCEDURE — U0005 INFEC AGEN DETEC AMPLI PROBE: HCPCS

## 2021-08-06 PROCEDURE — 99213 OFFICE O/P EST LOW 20 MIN: CPT | Performed by: NURSE PRACTITIONER

## 2021-08-06 PROCEDURE — U0003 INFECTIOUS AGENT DETECTION BY NUCLEIC ACID (DNA OR RNA); SEVERE ACUTE RESPIRATORY SYNDROME CORONAVIRUS 2 (SARS-COV-2) (CORONAVIRUS DISEASE [COVID-19]), AMPLIFIED PROBE TECHNIQUE, MAKING USE OF HIGH THROUGHPUT TECHNOLOGIES AS DESCRIBED BY CMS-2020-01-R: HCPCS

## 2021-08-06 ASSESSMENT — ENCOUNTER SYMPTOMS
SHORTNESS OF BREATH: 1
SINUS PAIN: 0
WHEEZING: 0
HEMOPTYSIS: 0
VOMITING: 0
DIAPHORESIS: 0
FEVER: 0
ABDOMINAL PAIN: 0
DIARRHEA: 0
SPUTUM PRODUCTION: 0
MYALGIAS: 1
PALPITATIONS: 0
NAUSEA: 1
ORTHOPNEA: 0
COUGH: 1
CHILLS: 1
SORE THROAT: 0
DIZZINESS: 0

## 2021-08-06 ASSESSMENT — FIBROSIS 4 INDEX: FIB4 SCORE: 3.24

## 2021-08-06 NOTE — PROGRESS NOTES
"Subjective:      Lori Mendes is a 51 y.o. female who presents with Fatigue (Cough, Fatigue, Dizziness, Headache, Body Aches. Pt states Sx started 1 week ago. )            Lori comes in today with 1 week symptoms, worse over past 3 days.  She reports nasal congestion, cough, nausea, chills, chest tightness, night sweats, and fatigue.  She is not taking any medication to treat the symptoms.  She Finished chemo and radiation for malignant left breast cancer in April 2021.  On tamoxifen.  She has not been vaccinated for covid-19.       Review of Systems   Constitutional: Positive for chills and malaise/fatigue. Negative for diaphoresis and fever.   HENT: Positive for congestion. Negative for ear pain, sinus pain and sore throat.    Respiratory: Positive for cough and shortness of breath. Negative for hemoptysis, sputum production and wheezing.    Cardiovascular: Negative for chest pain, palpitations, orthopnea and leg swelling.   Gastrointestinal: Positive for nausea. Negative for abdominal pain, diarrhea and vomiting.   Musculoskeletal: Positive for myalgias.   Neurological: Negative for dizziness.          Objective:     Blood Pressure (Abnormal) 92/60   Pulse 76   Temperature 36.7 °C (98 °F) (Temporal)   Respiration 18   Height 1.651 m (5' 5\")   Weight 98.4 kg (217 lb)   Oxygen Saturation 97%   Body Mass Index 36.11 kg/m²      Physical Exam  Vitals reviewed.   Constitutional:       General: She is not in acute distress.     Appearance: Normal appearance. She is well-developed. She is not ill-appearing, toxic-appearing or diaphoretic.   HENT:      Head: Normocephalic.      Right Ear: Tympanic membrane, ear canal and external ear normal.      Left Ear: Tympanic membrane, ear canal and external ear normal.      Nose: Congestion present. No rhinorrhea.      Comments: No sinus TTP.     Mouth/Throat:      Mouth: Mucous membranes are moist.      Pharynx: No oropharyngeal exudate or posterior " oropharyngeal erythema.   Eyes:      General: No scleral icterus.        Right eye: No discharge.         Left eye: No discharge.      Conjunctiva/sclera: Conjunctivae normal.      Pupils: Pupils are equal, round, and reactive to light.   Neck:      Thyroid: No thyromegaly.      Vascular: No JVD.      Trachea: No tracheal deviation.   Cardiovascular:      Rate and Rhythm: Normal rate and regular rhythm.      Heart sounds: Normal heart sounds. No murmur heard.   No friction rub. No gallop.    Pulmonary:      Effort: Pulmonary effort is normal. No respiratory distress.      Breath sounds: Normal breath sounds. No stridor. No wheezing, rhonchi or rales.   Chest:      Chest wall: No tenderness.   Abdominal:      General: There is no distension.      Tenderness: There is no abdominal tenderness.   Musculoskeletal:      Cervical back: Neck supple. No rigidity or tenderness.   Lymphadenopathy:      Cervical: No cervical adenopathy.   Skin:     General: Skin is warm and dry.      Capillary Refill: Capillary refill takes less than 2 seconds.      Coloration: Skin is not jaundiced or pale.      Findings: No erythema or rash.   Neurological:      Mental Status: She is alert and oriented to person, place, and time.   Psychiatric:         Mood and Affect: Mood normal.                        Assessment/Plan:        1. Nasal congestion  SARS-CoV-2, PCR (In-House)   2. Cough  SARS-CoV-2, PCR (In-House)   3. Nausea  SARS-CoV-2, PCR (In-House)   4. Chest tightness  SARS-CoV-2, PCR (In-House)   5. Chills  SARS-CoV-2, PCR (In-House)   6. Night sweats     7. Fatigue, unspecified type     8. History of breast cancer       Advised Lori that based on the history and exam findings, this is likely a self-limiting viral illness.  There is no indication for antibiotics at this time.  Differential reviewed.  At home isolation recommended pending covid test results.  OTC cold medications prn symptom management.  OTC NSAIDs or tylenol prn  fever, pain.  Maintain adequate po hydration.  RTC in 5-7 days if symptoms persist, sooner if worse.  ED precautions reviewed.  She verbalized understanding of and agreed with plan of care.

## 2021-08-07 DIAGNOSIS — R68.83 CHILLS: ICD-10-CM

## 2021-08-07 DIAGNOSIS — R09.81 NASAL CONGESTION: ICD-10-CM

## 2021-08-07 DIAGNOSIS — R05.9 COUGH: ICD-10-CM

## 2021-08-07 DIAGNOSIS — R11.0 NAUSEA: ICD-10-CM

## 2021-08-07 DIAGNOSIS — R07.89 CHEST TIGHTNESS: ICD-10-CM

## 2021-08-07 LAB — COVID ORDER STATUS COVID19: NORMAL

## 2021-08-08 LAB
SARS-COV-2 RNA RESP QL NAA+PROBE: NOTDETECTED
SPECIMEN SOURCE: NORMAL

## 2021-08-30 ENCOUNTER — OFFICE VISIT (OUTPATIENT)
Dept: MEDICAL GROUP | Facility: MEDICAL CENTER | Age: 51
End: 2021-08-30
Payer: COMMERCIAL

## 2021-08-30 VITALS
HEIGHT: 65 IN | HEART RATE: 67 BPM | TEMPERATURE: 98.7 F | BODY MASS INDEX: 37.5 KG/M2 | RESPIRATION RATE: 16 BRPM | WEIGHT: 225.09 LBS | DIASTOLIC BLOOD PRESSURE: 70 MMHG | SYSTOLIC BLOOD PRESSURE: 104 MMHG | OXYGEN SATURATION: 96 %

## 2021-08-30 DIAGNOSIS — Z17.0 MALIGNANT NEOPLASM OF CENTRAL PORTION OF LEFT BREAST IN FEMALE, ESTROGEN RECEPTOR POSITIVE (HCC): ICD-10-CM

## 2021-08-30 DIAGNOSIS — C50.112 MALIGNANT NEOPLASM OF CENTRAL PORTION OF LEFT BREAST IN FEMALE, ESTROGEN RECEPTOR POSITIVE (HCC): ICD-10-CM

## 2021-08-30 DIAGNOSIS — F33.0 MILD EPISODE OF RECURRENT MAJOR DEPRESSIVE DISORDER (HCC): ICD-10-CM
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
## 2021-08-30 PROCEDURE — 99213 OFFICE O/P EST LOW 20 MIN: CPT | Performed by: NURSE PRACTITIONER

## 2021-08-30 RX ORDER — DOXYCYCLINE HYCLATE 100 MG
100 TABLET ORAL DAILY
COMMUNITY
Start: 2021-08-26 | End: 2022-02-15

## 2021-08-30 RX ORDER — VENLAFAXINE HYDROCHLORIDE 37.5 MG/1
37.5 CAPSULE, EXTENDED RELEASE ORAL
COMMUNITY
Start: 2021-08-16

## 2021-08-30 RX ORDER — FOLIC ACID 1 MG/1
1 TABLET ORAL
COMMUNITY
Start: 2021-08-02

## 2021-08-30 RX ORDER — SULFAMETHOXAZOLE AND TRIMETHOPRIM 800; 160 MG/1; MG/1
1 TABLET ORAL DAILY
COMMUNITY
Start: 2021-08-26 | End: 2022-05-05

## 2021-08-30 RX ORDER — CITALOPRAM 20 MG/1
30 TABLET ORAL
COMMUNITY
Start: 2021-06-24 | End: 2021-11-08 | Stop reason: SDUPTHER

## 2021-08-30 ASSESSMENT — FIBROSIS 4 INDEX: FIB4 SCORE: 3.24

## 2021-08-30 NOTE — ASSESSMENT & PLAN NOTE
Worse but her right left upper arm recently.  She has had substantial swelling, there was some redness and heat over the area.  She was seen by OB/GYN for an unrelated issue and prescribed Bactrim and doxycycline.  She does feel that it starting to improve, about 50% better at this time.  Still having some pain

## 2021-08-30 NOTE — ASSESSMENT & PLAN NOTE
Stable, doing well at this time on citalopram and venlafaxine.  No concerns with depression.  She is having some fatigue which she thinks is residual from her chemotherapy treatment

## 2021-08-30 NOTE — PROGRESS NOTES
Subjective:     Chief Complaint   Patient presents with   • Animal Bite     horse bite (skin intact/ bruising)      Lori Mendes is a 51 y.o. female here today to follow up on:    Malignant neoplasm of left breast in female, estrogen receptor positive (HCC)  completed chemo and radiation, now on tamoxifen  Followed by Dr. Loaiza     Mild episode of recurrent major depressive disorder (HCC)  Stable, doing well at this time on citalopram and venlafaxine.  No concerns with depression.  She is having some fatigue which she thinks is residual from her chemotherapy treatment    Horse bite  Worse but her right left upper arm recently.  She has had substantial swelling, there was some redness and heat over the area.  She was seen by OB/GYN for an unrelated issue and prescribed Bactrim and doxycycline.  She does feel that it starting to improve, about 50% better at this time.  Still having some pain       Current medicines (including changes today)  Current Outpatient Medications   Medication Sig Dispense Refill   • venlafaxine XR (EFFEXOR XR) 37.5 MG CAPSULE SR 24 HR Take 37.5 mg by mouth every day.     • sulfamethoxazole-trimethoprim (BACTRIM DS) 800-160 MG tablet Take 1 Tablet by mouth every day. TAKE 1 TABLET BY MOUTH TWICE DAILY FOR 10 DAYS     • doxycycline (VIBRAMYCIN) 100 MG Tab Take 100 mg by mouth every day. TAKE 1 TABLET BY MOUTH TWICE DAILY FOR 10 DAYS     • folic acid (FOLVITE) 1 MG Tab Take 1 mg by mouth every day.     • citalopram (CELEXA) 20 MG Tab Take 30 mg by mouth every day.     • gabapentin (NEURONTIN) 600 MG tablet TAKE 1 TABLET BY MOUTH TWICE DAILY 180 tablet 1   • nitrofurantoin (MACROBID) 100 MG Cap Take 1 capsule by mouth 2 times a day. 10 capsule 0   • tamoxifen (NOLVADEX) 20 MG tablet Take 20 mg by mouth 2 times a day.     • traZODone (DESYREL) 50 MG Tab Take 1 tablet by mouth at bedtime as needed for Sleep. 90 tablet 2   • loperamide (IMODIUM) 2 MG Cap Take 2 mg by mouth 4 times a day  "as needed for Diarrhea.     • ondansetron (ZOFRAN) 4 MG Tab tablet Take 4 mg by mouth every 8 hours as needed for Nausea/Vomiting.     • lidocaine-prilocaine (EMLA) 2.5-2.5 % Cream Apply 1 g to affected area(s) 1 time daily as needed (Port Access).     • CALCIUM PO Take 1 Dose by mouth every morning. Unknown OTC Strength.     • Magnesium 500 MG Cap Take 500 mg by mouth every morning.     • VITAMIN D PO Take 1 Dose by mouth every morning. Unknown OTC Strength.     • POTASSIUM PO Take 1 Dose by mouth every morning. Unknown OTC Strength.       No current facility-administered medications for this visit.     She  has a past medical history of Anxiety disorder (1/9/2012), Bowel habit changes, Breast cancer (HCC), Cancer (HCC) (12/20/2019), GERD (gastroesophageal reflux disease), Heart burn, Lumbar radiculitis (2/7/2012), Malig neoplasm of upper-outer quadrant of unsp female breast (HCC) (12/19/2019), Obesity (BMI 30-39.9) (1/4/2013), and S/P laparoscopic cholecystectomy (10/9/14).    ROS included above     Objective:     /70 (BP Location: Right arm, Patient Position: Sitting, BP Cuff Size: Adult)   Pulse 67   Temp 37.1 °C (98.7 °F) (Temporal)   Resp 16   Ht 1.651 m (5' 5\")   Wt 102 kg (225 lb 1.4 oz)   SpO2 96%  Body mass index is 37.46 kg/m².     Physical Exam:  General: Alert, oriented in no acute distress.  Eye contact is good, speech is normal, affect calm  Lungs: clear to auscultation bilaterally, normal effort, no wheeze/ rhonchi/ rales.  CV: regular rate and rhythm, S1, S2, no murmur  Ext: LUE with large hematoma and ecchymosis.  No drainage or evidence of abscess. no edema, color normal, vascularity normal, temperature normal    Assessment and Plan:   The following treatment plan was discussed   1. Malignant neoplasm of central portion of left breast in female, estrogen receptor positive (HCC)   recently completed chemotherapy.  Now on tamoxifen, followed by oncology   2. Mild episode of recurrent " major depressive disorder (HCC)   stable   3. Horse bite, subsequent encounter   large hematoma to the left upper arm.  She is on doxycycline and Bactrim and will complete these courses.  No evidence of abscess.  Encouraged alternating ice and heat, ibuprofen or Tylenol as needed       Followup: as needed         Please note that this dictation was created using voice recognition software. I have worked with consultants from the vendor as well as technical experts from [facility] to optimize the interface. I have made every reasonable attempt to correct obvious errors, but I expect that there are errors of grammar and possibly content that I did not discover before finalizing the note.

## 2021-09-13 ENCOUNTER — PATIENT MESSAGE (OUTPATIENT)
Dept: MEDICAL GROUP | Facility: MEDICAL CENTER | Age: 51
End: 2021-09-13

## 2021-09-13 DIAGNOSIS — R22.32 ARM MASS, LEFT: ICD-10-CM

## 2021-09-22 RX ORDER — TRAZODONE HYDROCHLORIDE 50 MG/1
50 TABLET ORAL NIGHTLY PRN
Qty: 90 TABLET | Refills: 2 | Status: SHIPPED | OUTPATIENT
Start: 2021-09-22

## 2021-09-29 ENCOUNTER — HOSPITAL ENCOUNTER (OUTPATIENT)
Dept: LAB | Facility: MEDICAL CENTER | Age: 51
End: 2021-09-29
Attending: INTERNAL MEDICINE
Payer: COMMERCIAL

## 2021-09-29 LAB
BASOPHILS # BLD AUTO: 1.1 % (ref 0–1.8)
BASOPHILS # BLD: 0.05 K/UL (ref 0–0.12)
EOSINOPHIL # BLD AUTO: 0.17 K/UL (ref 0–0.51)
EOSINOPHIL NFR BLD: 3.7 % (ref 0–6.9)
ERYTHROCYTE [DISTWIDTH] IN BLOOD BY AUTOMATED COUNT: 61.9 FL (ref 35.9–50)
FERRITIN SERPL-MCNC: 129 NG/ML (ref 10–291)
HCT VFR BLD AUTO: 40.3 % (ref 37–47)
HGB BLD-MCNC: 13.3 G/DL (ref 12–16)
IMM GRANULOCYTES # BLD AUTO: 0.01 K/UL (ref 0–0.11)
IMM GRANULOCYTES NFR BLD AUTO: 0.2 % (ref 0–0.9)
IRON SATN MFR SERPL: 29 % (ref 15–55)
IRON SERPL-MCNC: 74 UG/DL (ref 40–170)
LYMPHOCYTES # BLD AUTO: 2.06 K/UL (ref 1–4.8)
LYMPHOCYTES NFR BLD: 45.2 % (ref 22–41)
MCH RBC QN AUTO: 30.4 PG (ref 27–33)
MCHC RBC AUTO-ENTMCNC: 33 G/DL (ref 33.6–35)
MCV RBC AUTO: 92.2 FL (ref 81.4–97.8)
MONOCYTES # BLD AUTO: 0.31 K/UL (ref 0–0.85)
MONOCYTES NFR BLD AUTO: 6.8 % (ref 0–13.4)
NEUTROPHILS # BLD AUTO: 1.96 K/UL (ref 2–7.15)
NEUTROPHILS NFR BLD: 43 % (ref 44–72)
NRBC # BLD AUTO: 0 K/UL
NRBC BLD-RTO: 0 /100 WBC
PLATELET # BLD AUTO: 110 K/UL (ref 164–446)
PMV BLD AUTO: 12.5 FL (ref 9–12.9)
RBC # BLD AUTO: 4.37 M/UL (ref 4.2–5.4)
TIBC SERPL-MCNC: 257 UG/DL (ref 250–450)
UIBC SERPL-MCNC: 183 UG/DL (ref 110–370)
WBC # BLD AUTO: 4.6 K/UL (ref 4.8–10.8)

## 2021-09-29 PROCEDURE — 36415 COLL VENOUS BLD VENIPUNCTURE: CPT

## 2021-09-29 PROCEDURE — 83550 IRON BINDING TEST: CPT

## 2021-09-29 PROCEDURE — 82728 ASSAY OF FERRITIN: CPT

## 2021-09-29 PROCEDURE — 83540 ASSAY OF IRON: CPT

## 2021-09-29 PROCEDURE — 85025 COMPLETE CBC W/AUTO DIFF WBC: CPT

## 2021-10-01 ENCOUNTER — TELEPHONE (OUTPATIENT)
Dept: MEDICAL GROUP | Facility: MEDICAL CENTER | Age: 51
End: 2021-10-01

## 2021-10-01 NOTE — TELEPHONE ENCOUNTER
VOICEMAIL  1. Caller Name: [facility]                       Call Back Number: [phone]    2. Message: pt referred over for mri/onsurance denied Auth for mri/ looking for over turn of refusal or  mri cancellation

## 2021-10-04 ENCOUNTER — TELEPHONE (OUTPATIENT)
Dept: MEDICAL GROUP | Facility: MEDICAL CENTER | Age: 51
End: 2021-10-04
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
## 2021-10-04 DIAGNOSIS — S49.92XS INJURY OF LEFT UPPER ARM, SEQUELA: ICD-10-CM

## 2021-10-04 RX ORDER — GABAPENTIN 600 MG/1
TABLET ORAL
Qty: 180 TABLET | Refills: 1 | Status: SHIPPED | OUTPATIENT
Start: 2021-10-04 | End: 2022-03-11 | Stop reason: SDUPTHER

## 2021-10-04 NOTE — TELEPHONE ENCOUNTER
Received request via: Pharmacy    Was the patient seen in the last year in this department? Yes    Does the patient have an active prescription (recently filled or refills available) for medication(s) requested? Yes. Per pharmacy- pt requested advanced approval of refills to avoid missed doses    Requested Prescriptions     Pending Prescriptions Disp Refills   • gabapentin (NEURONTIN) 600 MG tablet [Pharmacy Med Name: GABAPENTIN 600MG TABLETS] 180 Tablet 1     Sig: TAKE 1 TABLET BY MOUTH TWICE DAILY

## 2021-10-04 NOTE — TELEPHONE ENCOUNTER
[facility]      Reason for denial pt was found to have breast cancer. Will fax over denial letter and appeals process     If any questions or let her know what you decide [phone]

## 2021-10-20 ENCOUNTER — PATIENT MESSAGE (OUTPATIENT)
Dept: MEDICAL GROUP | Facility: MEDICAL CENTER | Age: 51
End: 2021-10-20

## 2021-11-05 ENCOUNTER — HOSPITAL ENCOUNTER (OUTPATIENT)
Dept: RADIOLOGY | Facility: MEDICAL CENTER | Age: 51
End: 2021-11-05
Attending: INTERNAL MEDICINE
Payer: COMMERCIAL

## 2021-11-05 DIAGNOSIS — D64.9 ANEMIA, UNSPECIFIED TYPE: ICD-10-CM

## 2021-11-05 DIAGNOSIS — C50.812 MALIGNANT NEOPLASM OF OVERLAPPING SITES OF LEFT FEMALE BREAST, UNSPECIFIED ESTROGEN RECEPTOR STATUS (HCC): ICD-10-CM

## 2021-11-05 PROCEDURE — G0279 TOMOSYNTHESIS, MAMMO: HCPCS
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
## 2021-11-08 DIAGNOSIS — F41.9 ANXIETY DISORDER, UNSPECIFIED TYPE: ICD-10-CM

## 2021-11-08 RX ORDER — CITALOPRAM 20 MG/1
30 TABLET ORAL
Qty: 45 TABLET | Refills: 11 | Status: SHIPPED | OUTPATIENT
Start: 2021-11-08 | End: 2022-05-05

## 2021-12-01 ENCOUNTER — OFFICE VISIT (OUTPATIENT)
Dept: MEDICAL GROUP | Facility: PHYSICIAN GROUP | Age: 51
End: 2021-12-01
Payer: COMMERCIAL

## 2021-12-01 VITALS
BODY MASS INDEX: 37.99 KG/M2 | HEART RATE: 83 BPM | DIASTOLIC BLOOD PRESSURE: 78 MMHG | HEIGHT: 65 IN | RESPIRATION RATE: 12 BRPM | WEIGHT: 228 LBS | OXYGEN SATURATION: 97 % | TEMPERATURE: 98.2 F | SYSTOLIC BLOOD PRESSURE: 132 MMHG

## 2021-12-01 DIAGNOSIS — C50.112 MALIGNANT NEOPLASM OF CENTRAL PORTION OF LEFT BREAST IN FEMALE, ESTROGEN RECEPTOR POSITIVE (HCC): ICD-10-CM

## 2021-12-01 DIAGNOSIS — Z17.0 MALIGNANT NEOPLASM OF CENTRAL PORTION OF LEFT BREAST IN FEMALE, ESTROGEN RECEPTOR POSITIVE (HCC): ICD-10-CM

## 2021-12-01 DIAGNOSIS — D64.9 ANEMIA, UNSPECIFIED TYPE: ICD-10-CM

## 2021-12-01 DIAGNOSIS — Z23 NEED FOR VACCINATION: ICD-10-CM

## 2021-12-01 DIAGNOSIS — E87.6 HYPOKALEMIA: ICD-10-CM

## 2021-12-01 DIAGNOSIS — K59.03 DRUG-INDUCED CONSTIPATION: ICD-10-CM

## 2021-12-01 DIAGNOSIS — E66.9 OBESITY (BMI 30-39.9): ICD-10-CM

## 2021-12-01 PROBLEM — R19.7 DIARRHEA IN ADULT PATIENT: Status: RESOLVED | Noted: 2019-12-05 | Resolved: 2021-12-01
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
## 2021-12-01 PROCEDURE — 90686 IIV4 VACC NO PRSV 0.5 ML IM: CPT | Performed by: FAMILY MEDICINE

## 2021-12-01 PROCEDURE — 90472 IMMUNIZATION ADMIN EACH ADD: CPT | Performed by: FAMILY MEDICINE

## 2021-12-01 PROCEDURE — 99214 OFFICE O/P EST MOD 30 MIN: CPT | Mod: 25 | Performed by: FAMILY MEDICINE

## 2021-12-01 PROCEDURE — 90471 IMMUNIZATION ADMIN: CPT | Performed by: FAMILY MEDICINE

## 2021-12-01 PROCEDURE — 90750 HZV VACC RECOMBINANT IM: CPT | Performed by: FAMILY MEDICINE

## 2021-12-01 ASSESSMENT — PATIENT HEALTH QUESTIONNAIRE - PHQ9
2. FEELING DOWN, DEPRESSED, IRRITABLE, OR HOPELESS: SEVERAL DAYS
SUM OF ALL RESPONSES TO PHQ QUESTIONS 1-9: 5
1. LITTLE INTEREST OR PLEASURE IN DOING THINGS: SEVERAL DAYS
7. TROUBLE CONCENTRATING ON THINGS, SUCH AS READING THE NEWSPAPER OR WATCHING TELEVISION: SEVERAL DAYS
SUM OF ALL RESPONSES TO PHQ9 QUESTIONS 1 AND 2: 2
3. TROUBLE FALLING OR STAYING ASLEEP OR SLEEPING TOO MUCH: NOT AT ALL
SUM OF ALL RESPONSES TO PHQ QUESTIONS 1-9: 5
8. MOVING OR SPEAKING SO SLOWLY THAT OTHER PEOPLE COULD HAVE NOTICED. OR THE OPPOSITE, BEING SO FIGETY OR RESTLESS THAT YOU HAVE BEEN MOVING AROUND A LOT MORE THAN USUAL: NOT AT ALL
CLINICAL INTERPRETATION OF PHQ2 SCORE: 2
5. POOR APPETITE OR OVEREATING: 1 - SEVERAL DAYS
5. POOR APPETITE OR OVEREATING: SEVERAL DAYS
9. THOUGHTS THAT YOU WOULD BE BETTER OFF DEAD, OR OF HURTING YOURSELF: NOT AT ALL
6. FEELING BAD ABOUT YOURSELF - OR THAT YOU ARE A FAILURE OR HAVE LET YOURSELF OR YOUR FAMILY DOWN: NOT AL ALL
4. FEELING TIRED OR HAVING LITTLE ENERGY: SEVERAL DAYS

## 2021-12-01 ASSESSMENT — FIBROSIS 4 INDEX: FIB4 SCORE: 3.51

## 2021-12-01 NOTE — ASSESSMENT & PLAN NOTE
Patient had a normal colonoscopy in 2019  She has blood in the stool and blood when wiping with toilet paper. Also has some constipation. She is taking a stool softener.   She has an appointment with GI for colonoscopy to evaluate the bleeding.

## 2021-12-01 NOTE — ASSESSMENT & PLAN NOTE
Patient is in remission, she is currently on Tamoxifen started Jan 2021. She had chemotherapy, a partial left mastectomy and reconstructive breast surgery, radiation.   She is taking a break of 4 weeks from it as she had suicidal ideation on it.   It also worsened her anxiety. She is part of a support group online and talked to her oncologist about taking a break from medication.   She is followed by Dr. Aldridge.

## 2021-12-01 NOTE — ASSESSMENT & PLAN NOTE
Patient has low iron in the past needed a transfusion.   She has been having similar symptoms of feeling cold  Repeat cbc, iron levels ordered.

## 2021-12-01 NOTE — PROGRESS NOTES
Subjective:   Lori Mendes is a 51 y.o. female here today for evaluation and management of:     Malignant neoplasm of left breast in female, estrogen receptor positive (HCC)  Patient is in remission, she is currently on Tamoxifen started Jan 2021. She had chemotherapy, a partial left mastectomy and reconstructive breast surgery, radiation.   She is taking a break of 4 weeks from it as she had suicidal ideation on it.   It also worsened her anxiety. She is part of a support group online and talked to her oncologist about taking a break from medication.   She is followed by Dr. Aldridge.     Drug-induced constipation  Patient had a normal colonoscopy in 2019  She has blood in the stool and blood when wiping with toilet paper. Also has some constipation. She is taking a stool softener.   She has an appointment with GI for colonoscopy to evaluate the bleeding.     Anemia  Patient has low iron in the past needed a transfusion.   She has been having similar symptoms of feeling cold  Repeat cbc, iron levels ordered.          Current medicines (including changes today)  Current Outpatient Medications   Medication Sig Dispense Refill   • citalopram (CELEXA) 20 MG Tab Take 1.5 Tablets by mouth every day. 45 Tablet 11   • gabapentin (NEURONTIN) 600 MG tablet TAKE 1 TABLET BY MOUTH TWICE DAILY 180 Tablet 1   • traZODone (DESYREL) 50 MG Tab Take 1 Tablet by mouth at bedtime as needed for Sleep. 90 Tablet 2   • sulfamethoxazole-trimethoprim (BACTRIM DS) 800-160 MG tablet Take 1 Tablet by mouth every day. TAKE 1 TABLET BY MOUTH TWICE DAILY FOR 10 DAYS     • doxycycline (VIBRAMYCIN) 100 MG Tab Take 100 mg by mouth every day. TAKE 1 TABLET BY MOUTH TWICE DAILY FOR 10 DAYS     • folic acid (FOLVITE) 1 MG Tab Take 1 mg by mouth every day.     • venlafaxine XR (EFFEXOR XR) 37.5 MG CAPSULE SR 24 HR Take 37.5 mg by mouth every day.     • tamoxifen (NOLVADEX) 20 MG tablet Take 20 mg by mouth 2 times a day.     • ondansetron  "(ZOFRAN) 4 MG Tab tablet Take 4 mg by mouth every 8 hours as needed for Nausea/Vomiting.     • CALCIUM PO Take 1 Dose by mouth every morning. Unknown OTC Strength.     • Magnesium 500 MG Cap Take 500 mg by mouth every morning.     • VITAMIN D PO Take 1 Dose by mouth every morning. Unknown OTC Strength.     • POTASSIUM PO Take 1 Dose by mouth every morning. Unknown OTC Strength.       No current facility-administered medications for this visit.     She  has a past medical history of Anxiety disorder (1/9/2012), Bowel habit changes, Breast cancer (HCC), Cancer (HCC) (12/20/2019), GERD (gastroesophageal reflux disease), Heart burn, Lumbar radiculitis (2/7/2012), Malig neoplasm of upper-outer quadrant of unsp female breast (HCC) (12/19/2019), Obesity (BMI 30-39.9) (1/4/2013), and S/P laparoscopic cholecystectomy (10/9/14).    ROS  No chest pain, no shortness of breath, no abdominal pain       Objective:     /78   Pulse 83   Temp 36.8 °C (98.2 °F) (Temporal)   Resp 12   Ht 1.651 m (5' 5\")   Wt 103 kg (228 lb)   SpO2 97%  Body mass index is 37.94 kg/m².   Physical Exam:  Constitutional: Alert, no distress.  Skin: Warm, dry, good turgor, no rashes in visible areas.  Eye: Equal, round and reactive, conjunctiva clear, lids normal.  ENMT: Lips without lesions, good dentition, oropharynx clear.  Neck: Trachea midline, no masses, no thyromegaly. No cervical or supraclavicular lymphadenopathy  Respiratory: Unlabored respiratory effort, lungs clear to auscultation, no wheezes, no ronchi.  Cardiovascular: Normal S1, S2, no murmur, no edema.  Abdomen: Soft, non-tender, no masses, no hepatosplenomegaly.  Psych: Alert and oriented x3, normal affect and mood.        Assessment and Plan:   The following treatment plan was discussed    1. Need for vaccination    - Shingrix Vaccine  - INFLUENZA VACCINE QUAD INJ (PF)    2. Malignant neoplasm of central portion of left breast in female, estrogen receptor positive (HCC)    - " Comp Metabolic Panel; Future    3. Drug-induced constipation      4. Anemia, unspecified type    - IRON/TOTAL IRON BIND; Future  - FERRITIN; Future  - CBC WITH DIFFERENTIAL; Future    5. Hypokalemia    - Comp Metabolic Panel; Future    6. Obesity (BMI 30-39.9)    - Patient identified as having weight management issue.  Appropriate orders and counseling given.      Followup: Return in about 3 months (around 3/1/2022) for anemia, hx of breast cancer and horse bite.

## 2021-12-20 ENCOUNTER — HOSPITAL ENCOUNTER (OUTPATIENT)
Dept: LAB | Facility: MEDICAL CENTER | Age: 51
End: 2021-12-20
Attending: FAMILY MEDICINE
Payer: COMMERCIAL

## 2021-12-20 DIAGNOSIS — Z17.0 MALIGNANT NEOPLASM OF CENTRAL PORTION OF LEFT BREAST IN FEMALE, ESTROGEN RECEPTOR POSITIVE (HCC): ICD-10-CM

## 2021-12-20 DIAGNOSIS — C50.112 MALIGNANT NEOPLASM OF CENTRAL PORTION OF LEFT BREAST IN FEMALE, ESTROGEN RECEPTOR POSITIVE (HCC): ICD-10-CM

## 2021-12-20 DIAGNOSIS — E87.6 HYPOKALEMIA: ICD-10-CM

## 2021-12-20 DIAGNOSIS — D64.9 ANEMIA, UNSPECIFIED TYPE: ICD-10-CM

## 2021-12-20 LAB
ALBUMIN SERPL BCP-MCNC: 4.7 G/DL (ref 3.2–4.9)
ALBUMIN/GLOB SERPL: 1.7 G/DL
ALP SERPL-CCNC: 61 U/L (ref 30–99)
ALT SERPL-CCNC: 17 U/L (ref 2–50)
ANION GAP SERPL CALC-SCNC: 11 MMOL/L (ref 7–16)
AST SERPL-CCNC: 26 U/L (ref 12–45)
BASOPHILS # BLD AUTO: 0.8 % (ref 0–1.8)
BASOPHILS # BLD: 0.05 K/UL (ref 0–0.12)
BILIRUB SERPL-MCNC: 0.9 MG/DL (ref 0.1–1.5)
BUN SERPL-MCNC: 17 MG/DL (ref 8–22)
CALCIUM SERPL-MCNC: 9.8 MG/DL (ref 8.5–10.5)
CHLORIDE SERPL-SCNC: 107 MMOL/L (ref 96–112)
CO2 SERPL-SCNC: 25 MMOL/L (ref 20–33)
CREAT SERPL-MCNC: 0.65 MG/DL (ref 0.5–1.4)
EOSINOPHIL # BLD AUTO: 0.13 K/UL (ref 0–0.51)
EOSINOPHIL NFR BLD: 2.1 % (ref 0–6.9)
ERYTHROCYTE [DISTWIDTH] IN BLOOD BY AUTOMATED COUNT: 53.6 FL (ref 35.9–50)
FASTING STATUS PATIENT QL REPORTED: NORMAL
FERRITIN SERPL-MCNC: 108 NG/ML (ref 10–291)
GLOBULIN SER CALC-MCNC: 2.7 G/DL (ref 1.9–3.5)
GLUCOSE SERPL-MCNC: 94 MG/DL (ref 65–99)
HCT VFR BLD AUTO: 41 % (ref 37–47)
HGB BLD-MCNC: 13.4 G/DL (ref 12–16)
IMM GRANULOCYTES # BLD AUTO: 0.02 K/UL (ref 0–0.11)
IMM GRANULOCYTES NFR BLD AUTO: 0.3 % (ref 0–0.9)
IRON SATN MFR SERPL: 27 % (ref 15–55)
IRON SERPL-MCNC: 84 UG/DL (ref 40–170)
LYMPHOCYTES # BLD AUTO: 2.08 K/UL (ref 1–4.8)
LYMPHOCYTES NFR BLD: 34.3 % (ref 22–41)
MCH RBC QN AUTO: 32 PG (ref 27–33)
MCHC RBC AUTO-ENTMCNC: 32.7 G/DL (ref 33.6–35)
MCV RBC AUTO: 97.9 FL (ref 81.4–97.8)
MONOCYTES # BLD AUTO: 0.4 K/UL (ref 0–0.85)
MONOCYTES NFR BLD AUTO: 6.6 % (ref 0–13.4)
NEUTROPHILS # BLD AUTO: 3.38 K/UL (ref 2–7.15)
NEUTROPHILS NFR BLD: 55.9 % (ref 44–72)
NRBC # BLD AUTO: 0 K/UL
NRBC BLD-RTO: 0 /100 WBC
PLATELET # BLD AUTO: 123 K/UL (ref 164–446)
PMV BLD AUTO: 11.3 FL (ref 9–12.9)
POTASSIUM SERPL-SCNC: 3.7 MMOL/L (ref 3.6–5.5)
PROT SERPL-MCNC: 7.4 G/DL (ref 6–8.2)
RBC # BLD AUTO: 4.19 M/UL (ref 4.2–5.4)
SODIUM SERPL-SCNC: 143 MMOL/L (ref 135–145)
TIBC SERPL-MCNC: 313 UG/DL (ref 250–450)
UIBC SERPL-MCNC: 229 UG/DL (ref 110–370)
WBC # BLD AUTO: 6.1 K/UL (ref 4.8–10.8)

## 2021-12-20 PROCEDURE — 36415 COLL VENOUS BLD VENIPUNCTURE: CPT

## 2021-12-20 PROCEDURE — 80053 COMPREHEN METABOLIC PANEL: CPT

## 2021-12-20 PROCEDURE — 85025 COMPLETE CBC W/AUTO DIFF WBC: CPT

## 2021-12-20 PROCEDURE — 83550 IRON BINDING TEST: CPT

## 2021-12-20 PROCEDURE — 82728 ASSAY OF FERRITIN: CPT

## 2021-12-20 PROCEDURE — 83540 ASSAY OF IRON: CPT

## 2021-12-21 NOTE — RESULT ENCOUNTER NOTE
Released to [facility],  Your labs show normal iron levels, potassium and wbcs are back to normal and platelets have improved!  Lisa Cardoso M.D.

## 2022-02-15 ENCOUNTER — HOSPITAL ENCOUNTER (OUTPATIENT)
Dept: LAB | Facility: MEDICAL CENTER | Age: 52
End: 2022-02-15
Attending: FAMILY MEDICINE
Payer: COMMERCIAL

## 2022-02-15 ENCOUNTER — OFFICE VISIT (OUTPATIENT)
Dept: MEDICAL GROUP | Facility: PHYSICIAN GROUP | Age: 52
End: 2022-02-15
Payer: COMMERCIAL

## 2022-02-15 VITALS
HEART RATE: 115 BPM | RESPIRATION RATE: 12 BRPM | SYSTOLIC BLOOD PRESSURE: 118 MMHG | DIASTOLIC BLOOD PRESSURE: 72 MMHG | OXYGEN SATURATION: 97 % | WEIGHT: 234 LBS | HEIGHT: 65 IN | BODY MASS INDEX: 38.99 KG/M2 | TEMPERATURE: 98.1 F

## 2022-02-15 DIAGNOSIS — Z23 NEED FOR VACCINATION: ICD-10-CM

## 2022-02-15 DIAGNOSIS — C50.112 MALIGNANT NEOPLASM OF CENTRAL PORTION OF LEFT BREAST IN FEMALE, ESTROGEN RECEPTOR POSITIVE (HCC): ICD-10-CM

## 2022-02-15 DIAGNOSIS — Z17.0 MALIGNANT NEOPLASM OF CENTRAL PORTION OF LEFT BREAST IN FEMALE, ESTROGEN RECEPTOR POSITIVE (HCC): ICD-10-CM

## 2022-02-15 DIAGNOSIS — Z79.810 LONG-TERM CURRENT USE OF TAMOXIFEN: ICD-10-CM

## 2022-02-15 DIAGNOSIS — R10.2 PELVIC PAIN: ICD-10-CM

## 2022-02-15 DIAGNOSIS — R10.32 LLQ PAIN: ICD-10-CM

## 2022-02-15 LAB
APPEARANCE UR: NORMAL
BILIRUB UR STRIP-MCNC: NORMAL MG/DL
CANCER AG125 SERPL-ACNC: 7.7 U/ML (ref 0–35)
COLOR UR AUTO: YELLOW
GLUCOSE UR STRIP.AUTO-MCNC: NORMAL MG/DL
KETONES UR STRIP.AUTO-MCNC: NORMAL MG/DL
LEUKOCYTE ESTERASE UR QL STRIP.AUTO: NORMAL
NITRITE UR QL STRIP.AUTO: NORMAL
PH UR STRIP.AUTO: 5 [PH] (ref 5–8)
PROT UR QL STRIP: NORMAL MG/DL
RBC UR QL AUTO: NORMAL
SP GR UR STRIP.AUTO: >=1.03
UROBILINOGEN UR STRIP-MCNC: 0.2 MG/DL

## 2022-02-15 PROCEDURE — 36415 COLL VENOUS BLD VENIPUNCTURE: CPT

## 2022-02-15 PROCEDURE — 99214 OFFICE O/P EST MOD 30 MIN: CPT | Performed by: FAMILY MEDICINE

## 2022-02-15 PROCEDURE — 81002 URINALYSIS NONAUTO W/O SCOPE: CPT | Performed by: FAMILY MEDICINE

## 2022-02-15 PROCEDURE — 86304 IMMUNOASSAY TUMOR CA 125: CPT

## 2022-02-15 ASSESSMENT — FIBROSIS 4 INDEX: FIB4 SCORE: 2.72

## 2022-02-15 ASSESSMENT — PATIENT HEALTH QUESTIONNAIRE - PHQ9: CLINICAL INTERPRETATION OF PHQ2 SCORE: 0

## 2022-02-15 NOTE — PROGRESS NOTES
Subjective:   Lori Mendes is a 51 y.o. female here today for evaluation and management of:     LLQ pain  Since Friday sharp stabbing intermittent LLQ pain. With some low back pain.   She has no inc ur freq, dysuria or hematuria.   Hx of left breast cancer started tamoxifen in the last year. Tamoxifen is associated with ovarian ca  Will check pelvic US, ordered lab for ca 125  Sexually active, no period since 2019,  had a vasectomy.   poct UA to check for blood to make sure not kidney stone.     Patient also has cough and cold symptoms.   I wore an N95 for the visit.   She checked home covid tests x 3 which were all negative.          Current medicines (including changes today)  Current Outpatient Medications   Medication Sig Dispense Refill   • Zoster Vac Recomb Adjuvanted (SHINGRIX) 50 MCG/0.5ML Recon Susp Inject 0.5 mL into the shoulder, thigh, or buttocks one time for 1 dose. 0.5 mL 0   • citalopram (CELEXA) 20 MG Tab Take 1.5 Tablets by mouth every day. 45 Tablet 11   • gabapentin (NEURONTIN) 600 MG tablet TAKE 1 TABLET BY MOUTH TWICE DAILY 180 Tablet 1   • traZODone (DESYREL) 50 MG Tab Take 1 Tablet by mouth at bedtime as needed for Sleep. 90 Tablet 2   • sulfamethoxazole-trimethoprim (BACTRIM DS) 800-160 MG tablet Take 1 Tablet by mouth every day. TAKE 1 TABLET BY MOUTH TWICE DAILY FOR 10 DAYS     • folic acid (FOLVITE) 1 MG Tab Take 1 mg by mouth every day.     • venlafaxine XR (EFFEXOR XR) 37.5 MG CAPSULE SR 24 HR Take 37.5 mg by mouth every day.     • tamoxifen (NOLVADEX) 20 MG tablet Take 20 mg by mouth 2 times a day.     • ondansetron (ZOFRAN) 4 MG Tab tablet Take 4 mg by mouth every 8 hours as needed for Nausea/Vomiting.     • CALCIUM PO Take 1 Dose by mouth every morning. Unknown OTC Strength.     • Magnesium 500 MG Cap Take 500 mg by mouth every morning.     • VITAMIN D PO Take 1 Dose by mouth every morning. Unknown OTC Strength.     • POTASSIUM PO Take 1 Dose by mouth every morning.  "Unknown OTC Strength.       No current facility-administered medications for this visit.     She  has a past medical history of Anxiety disorder (1/9/2012), Bowel habit changes, Breast cancer (HCC), Cancer (HCC) (12/20/2019), GERD (gastroesophageal reflux disease), Heart burn, Lumbar radiculitis (2/7/2012), Malig neoplasm of upper-outer quadrant of unsp female breast (HCC) (12/19/2019), Obesity (BMI 30-39.9) (1/4/2013), and S/P laparoscopic cholecystectomy (10/9/14).    ROS  No chest pain, no shortness of breath, no abdominal pain       Objective:     /72   Pulse (!) 115   Temp 36.7 °C (98.1 °F) (Temporal)   Resp 12   Ht 1.651 m (5' 5\")   Wt 106 kg (234 lb)   SpO2 97%  Body mass index is 38.94 kg/m².   Physical Exam:  Constitutional: Alert, no distress.  Skin: Warm, dry, good turgor, no rashes in visible areas.  Eye: Equal, round and reactive, conjunctiva clear, lids normal.  ENMT: Lips without lesions, good dentition, oropharynx clear.  Neck: Trachea midline, no masses, no thyromegaly. No cervical or supraclavicular lymphadenopathy  Respiratory: Unlabored respiratory effort, lungs clear to auscultation, no wheezes, no ronchi.  Cardiovascular: Normal S1, S2, no murmur, no edema.  Abdomen: Soft, non-tender, no masses, no hepatosplenomegaly. Mild LLQ pain, no rebound, no guarding. No CVA tenderness.   Psych: Alert and oriented x3, normal affect and mood.        Assessment and Plan:   The following treatment plan was discussed    1. LLQ pain    - POCT Urinalysis  - US-PELVIC COMPLETE (TRANSABDOMINAL/TRANSVAGINAL) (COMBO); Future    2. Long-term current use of tamoxifen    - ; Future    3. Pelvic pain    - ; Future    4. Malignant neoplasm of central portion of left breast in female, estrogen receptor positive (HCC)    - ; Future    5. Need for vaccination    - Zoster Vac Recomb Adjuvanted (SHINGRIX) 50 MCG/0.5ML Recon Susp; Inject 0.5 mL into the shoulder, thigh, or buttocks one time for 1 " dose.  Dispense: 0.5 mL; Refill: 0      Followup: Return in about 3 months (around 5/15/2022) for hx of br ca, on tamoxifene, .

## 2022-02-15 NOTE — ASSESSMENT & PLAN NOTE
Since Friday sharp stabbing intermittent LLQ pain. With some low back pain.   She has no inc ur freq, dysuria or hematuria.   Hx of left breast cancer started tamoxifen in the last year. Tamoxifen is associated with ovarian ca  Will check pelvic US, ordered lab for ca 125  Sexually active, no period since 2019,  had a vasectomy.   poct UA to check for blood to make sure not kidney stone.     Patient also has cough and cold symptoms.   I wore an N95 for the visit.   She checked home covid tests x 3 which were all negative.

## 2022-02-28 ENCOUNTER — HOSPITAL ENCOUNTER (OUTPATIENT)
Dept: RADIOLOGY | Facility: MEDICAL CENTER | Age: 52
End: 2022-02-28
Attending: FAMILY MEDICINE
Payer: COMMERCIAL

## 2022-02-28 DIAGNOSIS — R10.32 LLQ PAIN: ICD-10-CM

## 2022-02-28 PROCEDURE — 76830 TRANSVAGINAL US NON-OB: CPT

## 2022-03-02 NOTE — RESULT ENCOUNTER NOTE
Released to marcellus Sandoval,  Your ultrasound shows no concerning findings. There are some changes related to taking tamoxifen: small cysts in the uterus and also there is a fibroid in the uterus wall. This could be the cause of pain. Ob/gyn could discuss if a hysterectomy would help or other treatment.   Lisa Cardoso M.D.

## 2022-03-11 ENCOUNTER — PATIENT MESSAGE (OUTPATIENT)
Dept: MEDICAL GROUP | Facility: PHYSICIAN GROUP | Age: 52
End: 2022-03-11
Payer: COMMERCIAL

## 2022-03-11 RX ORDER — GABAPENTIN 600 MG/1
600 TABLET ORAL 2 TIMES DAILY
Qty: 180 TABLET | Refills: 3 | Status: SHIPPED | OUTPATIENT
Start: 2022-03-11 | End: 2023-03-27 | Stop reason: SDUPTHER

## 2022-03-11 NOTE — PATIENT COMMUNICATION
Received request via: Patient    Was the patient seen in the last year in this department? Yes  Last OV 2/15/22    Does the patient have an active prescription (recently filled or refills available) for medication(s) requested? No    Requested Prescriptions     Pending Prescriptions Disp Refills   • gabapentin (NEURONTIN) 600 MG tablet 180 Tablet 3     Sig: Take 1 Tablet by mouth 2 times a day.

## 2022-04-01 DIAGNOSIS — R53.82 CHRONIC FATIGUE: ICD-10-CM

## 2022-04-28 ENCOUNTER — HOSPITAL ENCOUNTER (OUTPATIENT)
Dept: LAB | Facility: MEDICAL CENTER | Age: 52
End: 2022-04-28
Attending: FAMILY MEDICINE
Payer: COMMERCIAL

## 2022-04-28 DIAGNOSIS — R53.82 CHRONIC FATIGUE: ICD-10-CM

## 2022-04-28 LAB — TSH SERPL DL<=0.005 MIU/L-ACNC: 2.69 UIU/ML (ref 0.38–5.33)

## 2022-04-28 PROCEDURE — 36415 COLL VENOUS BLD VENIPUNCTURE: CPT

## 2022-04-28 PROCEDURE — 84443 ASSAY THYROID STIM HORMONE: CPT

## 2022-05-04 ENCOUNTER — HOSPITAL ENCOUNTER (OUTPATIENT)
Dept: RADIOLOGY | Facility: MEDICAL CENTER | Age: 52
End: 2022-05-04
Attending: FAMILY MEDICINE
Payer: COMMERCIAL

## 2022-05-04 DIAGNOSIS — R92.8 BI-RADS CATEGORY 3 MAMMOGRAM RESULT: ICD-10-CM

## 2022-05-04 DIAGNOSIS — R92.8 ABNORMAL SCREENING MAMMOGRAM: ICD-10-CM

## 2022-05-04 PROCEDURE — G0279 TOMOSYNTHESIS, MAMMO: HCPCS

## 2022-05-04 NOTE — RESULT ENCOUNTER NOTE
Released to marcellus Sandoval  Your mammogram of right breast looked good! They recommended a 6 month follow up I've ordered the diagnostic bilateral mammogram for 6 months.  Please let me know immediately if you notice any new lumps/rashes/pain/nipple discharge.  Lisa Cardoso M.D.

## 2022-05-05 ENCOUNTER — OFFICE VISIT (OUTPATIENT)
Dept: MEDICAL GROUP | Facility: PHYSICIAN GROUP | Age: 52
End: 2022-05-05
Payer: COMMERCIAL

## 2022-05-05 VITALS
TEMPERATURE: 97.7 F | HEART RATE: 76 BPM | DIASTOLIC BLOOD PRESSURE: 88 MMHG | RESPIRATION RATE: 20 BRPM | WEIGHT: 233 LBS | BODY MASS INDEX: 39.78 KG/M2 | SYSTOLIC BLOOD PRESSURE: 124 MMHG | OXYGEN SATURATION: 95 % | HEIGHT: 64 IN

## 2022-05-05 DIAGNOSIS — K60.2 ANAL FISSURE: ICD-10-CM

## 2022-05-05 DIAGNOSIS — E66.9 OBESITY (BMI 30-39.9): ICD-10-CM

## 2022-05-05 DIAGNOSIS — F33.0 MILD EPISODE OF RECURRENT MAJOR DEPRESSIVE DISORDER (HCC): ICD-10-CM

## 2022-05-05 PROCEDURE — 99214 OFFICE O/P EST MOD 30 MIN: CPT | Performed by: FAMILY MEDICINE

## 2022-05-05 RX ORDER — CITALOPRAM 40 MG/1
40 TABLET ORAL DAILY
Qty: 90 TABLET | Refills: 3 | Status: SHIPPED | OUTPATIENT
Start: 2022-05-05

## 2022-05-05 RX ORDER — NITROGLYCERIN 20 MG/G
0.5 OINTMENT TOPICAL
Qty: 30 G | Refills: 0 | Status: SHIPPED | OUTPATIENT
Start: 2022-05-05 | End: 2022-05-23 | Stop reason: SDUPTHER

## 2022-05-05 ASSESSMENT — FIBROSIS 4 INDEX: FIB4 SCORE: 2.72

## 2022-05-05 NOTE — ASSESSMENT & PLAN NOTE
Worsening depression symptoms, not controlled on trazodine, effexor, unable to go out of the house much.   celexa dose increased to 40 mg

## 2022-05-05 NOTE — PROGRESS NOTES
Subjective:   Lori Mendes is a 51 y.o. female here today for evaluation and management of:     Obesity (BMI 30-39.9)  She is using a diet tracker: does not eat breakfast, lung is salami, string cheese, crackers, dinner is taco/chicken/salad.  She will eat hard boiled egg or greek yogurt with almonds daily.   She drinks only water and coffee  She finds herself sleep eating at night at time but only a 50 dianne popsicle.   She does not buy high calorie snacks.   Lives alone since her son moved out recently.     TSH is normal  Ref to nutrition discussed  She is encouraged   She walks 20 min daily.       Mild episode of recurrent major depressive disorder (HCC)  Worsening depression symptoms, not controlled on trazodine, effexor, unable to go out of the house much.   celexa dose increased to 40 mg    Anal fissure  History of GI surgery, ostomy after cancer treatment in the past.     Currently having significant pain from anal fissures.   rx for nitro ointment provided.     She has follow up with GI         Current medicines (including changes today)  Current Outpatient Medications   Medication Sig Dispense Refill   • Zoster Vac Recomb Adjuvanted (SHINGRIX) 50 MCG/0.5ML Recon Susp Inject 0.5 mL into the shoulder, thigh, or buttocks one time for 1 dose. 0.5 mL 0   • citalopram (CELEXA) 40 MG Tab Take 1 Tablet by mouth every day. 90 Tablet 3   • nitroglycerin (NITRO-BID) 2 % Ointment Place 0.5 Inches on the skin 1 time a day as needed (anal fissure pain). 30 g 0   • gabapentin (NEURONTIN) 600 MG tablet Take 1 Tablet by mouth 2 times a day. 180 Tablet 3   • traZODone (DESYREL) 50 MG Tab Take 1 Tablet by mouth at bedtime as needed for Sleep. 90 Tablet 2   • folic acid (FOLVITE) 1 MG Tab Take 1 mg by mouth every day.     • venlafaxine XR (EFFEXOR XR) 37.5 MG CAPSULE SR 24 HR Take 37.5 mg by mouth every day.     • tamoxifen (NOLVADEX) 20 MG tablet Take 20 mg by mouth 2 times a day.       No current  "facility-administered medications for this visit.     She  has a past medical history of Anxiety disorder (1/9/2012), Bowel habit changes, Breast cancer (HCC), Cancer (HCC) (12/20/2019), GERD (gastroesophageal reflux disease), Heart burn, Lumbar radiculitis (2/7/2012), Malig neoplasm of upper-outer quadrant of unsp female breast (HCC) (12/19/2019), Obesity (BMI 30-39.9) (1/4/2013), and S/P laparoscopic cholecystectomy (10/9/14).    ROS  No chest pain, no shortness of breath, no abdominal pain       Objective:     /88   Pulse 76   Temp 36.5 °C (97.7 °F) (Temporal)   Resp 20   Ht 1.626 m (5' 4\")   Wt 106 kg (233 lb)   SpO2 95%  Body mass index is 39.99 kg/m².   Physical Exam:  Constitutional: Alert, no distress.  Skin: Warm, dry, good turgor, no rashes in visible areas.  Eye: Equal, round and reactive, conjunctiva clear, lids normal.  ENMT: Lips without lesions, good dentition, oropharynx clear.  Neck: Trachea midline, no masses, no thyromegaly. No cervical or supraclavicular lymphadenopathy  Respiratory: Unlabored respiratory effort, lungs clear to auscultation, no wheezes, no ronchi.  Cardiovascular: Normal S1, S2, no murmur, no edema.  Abdomen: Soft, non-tender, no masses, no hepatosplenomegaly.  Psych: Alert and oriented x3, normal affect and mood.        Assessment and Plan:   The following treatment plan was discussed    1. Obesity (BMI 30-39.9)    - Referral to Nutrition Services    2. Mild episode of recurrent major depressive disorder (HCC)    - Referral to Psychology    3. Anal fissure        Followup: Return in about 3 months (around 8/5/2022) for obesity, depression.         "

## 2022-05-05 NOTE — ASSESSMENT & PLAN NOTE
She is using a diet tracker: does not eat breakfast, lung is salami, string cheese, crackers, dinner is taco/chicken/salad.  She will eat hard boiled egg or greek yogurt with almonds daily.   She drinks only water and coffee  She finds herself sleep eating at night at time but only a 50 dianne popsicle.   She does not buy high calorie snacks.   Lives alone since her son moved out recently.     TSH is normal  Ref to nutrition discussed  She is encouraged   She walks 20 min daily.

## 2022-05-05 NOTE — ASSESSMENT & PLAN NOTE
History of GI surgery, ostomy after cancer treatment in the past.     Currently having significant pain from anal fissures.   rx for nitro ointment provided.     She has follow up with GI

## 2022-05-23 NOTE — TELEPHONE ENCOUNTER
Received request via: Pharmacy    Was the patient seen in the last year in this department? Yes    Does the patient have an active prescription (recently filled or refills available) for medication(s) requested? No     Last ov 5/5/22

## 2022-05-24 RX ORDER — NITROGLYCERIN 20 MG/G
0.5 OINTMENT TOPICAL
Qty: 30 G | Refills: 1 | Status: SHIPPED | OUTPATIENT
Start: 2022-05-24

## 2022-06-16 ENCOUNTER — HOSPITAL ENCOUNTER (OUTPATIENT)
Dept: LAB | Facility: MEDICAL CENTER | Age: 52
End: 2022-06-16
Attending: PSYCHIATRY & NEUROLOGY
Payer: COMMERCIAL

## 2022-06-16 LAB
25(OH)D3 SERPL-MCNC: 64 NG/ML (ref 30–100)
ALBUMIN SERPL BCP-MCNC: 4.4 G/DL (ref 3.2–4.9)
ALBUMIN/GLOB SERPL: 1.5 G/DL
ALP SERPL-CCNC: 73 U/L (ref 30–99)
ALT SERPL-CCNC: 21 U/L (ref 2–50)
ANION GAP SERPL CALC-SCNC: 13 MMOL/L (ref 7–16)
AST SERPL-CCNC: 24 U/L (ref 12–45)
BASOPHILS # BLD AUTO: 0.7 % (ref 0–1.8)
BASOPHILS # BLD: 0.04 K/UL (ref 0–0.12)
BILIRUB SERPL-MCNC: 0.7 MG/DL (ref 0.1–1.5)
BUN SERPL-MCNC: 21 MG/DL (ref 8–22)
CALCIUM SERPL-MCNC: 9.8 MG/DL (ref 8.5–10.5)
CHLORIDE SERPL-SCNC: 106 MMOL/L (ref 96–112)
CO2 SERPL-SCNC: 23 MMOL/L (ref 20–33)
CREAT SERPL-MCNC: 0.76 MG/DL (ref 0.5–1.4)
EOSINOPHIL # BLD AUTO: 0.17 K/UL (ref 0–0.51)
EOSINOPHIL NFR BLD: 3 % (ref 0–6.9)
ERYTHROCYTE [DISTWIDTH] IN BLOOD BY AUTOMATED COUNT: 51.2 FL (ref 35.9–50)
FOLATE SERPL-MCNC: 16.8 NG/ML
GFR SERPLBLD CREATININE-BSD FMLA CKD-EPI: 93 ML/MIN/1.73 M 2
GLOBULIN SER CALC-MCNC: 2.9 G/DL (ref 1.9–3.5)
GLUCOSE SERPL-MCNC: 84 MG/DL (ref 65–99)
HCT VFR BLD AUTO: 42.8 % (ref 37–47)
HGB BLD-MCNC: 14 G/DL (ref 12–16)
IMM GRANULOCYTES # BLD AUTO: 0.02 K/UL (ref 0–0.11)
IMM GRANULOCYTES NFR BLD AUTO: 0.4 % (ref 0–0.9)
LYMPHOCYTES # BLD AUTO: 2.01 K/UL (ref 1–4.8)
LYMPHOCYTES NFR BLD: 35.7 % (ref 22–41)
MCH RBC QN AUTO: 31 PG (ref 27–33)
MCHC RBC AUTO-ENTMCNC: 32.7 G/DL (ref 33.6–35)
MCV RBC AUTO: 94.9 FL (ref 81.4–97.8)
MONOCYTES # BLD AUTO: 0.36 K/UL (ref 0–0.85)
MONOCYTES NFR BLD AUTO: 6.4 % (ref 0–13.4)
NEUTROPHILS # BLD AUTO: 3.03 K/UL (ref 2–7.15)
NEUTROPHILS NFR BLD: 53.8 % (ref 44–72)
NRBC # BLD AUTO: 0 K/UL
NRBC BLD-RTO: 0 /100 WBC
PLATELET # BLD AUTO: 118 K/UL (ref 164–446)
PMV BLD AUTO: 11.3 FL (ref 9–12.9)
POTASSIUM SERPL-SCNC: 4.2 MMOL/L (ref 3.6–5.5)
PROT SERPL-MCNC: 7.3 G/DL (ref 6–8.2)
RBC # BLD AUTO: 4.51 M/UL (ref 4.2–5.4)
SODIUM SERPL-SCNC: 142 MMOL/L (ref 135–145)
VIT B12 SERPL-MCNC: 920 PG/ML (ref 211–911)
WBC # BLD AUTO: 5.6 K/UL (ref 4.8–10.8)

## 2022-06-16 PROCEDURE — 84270 ASSAY OF SEX HORMONE GLOBUL: CPT

## 2022-06-16 PROCEDURE — 84402 ASSAY OF FREE TESTOSTERONE: CPT

## 2022-06-16 PROCEDURE — 81291 MTHFR GENE: CPT

## 2022-06-16 PROCEDURE — 36415 COLL VENOUS BLD VENIPUNCTURE: CPT

## 2022-06-16 PROCEDURE — 85025 COMPLETE CBC W/AUTO DIFF WBC: CPT

## 2022-06-16 PROCEDURE — 84403 ASSAY OF TOTAL TESTOSTERONE: CPT

## 2022-06-16 PROCEDURE — 80053 COMPREHEN METABOLIC PANEL: CPT

## 2022-06-16 PROCEDURE — 82306 VITAMIN D 25 HYDROXY: CPT

## 2022-06-16 PROCEDURE — 82746 ASSAY OF FOLIC ACID SERUM: CPT

## 2022-06-16 PROCEDURE — 82607 VITAMIN B-12: CPT

## 2022-06-20 LAB
MTHFR C.1298A>C GENO BLD/T: ABNORMAL
MTHFR C.677C>T GENO BLD/T: NEGATIVE
MTHFR GENE MUT ANL BLD/T: ABNORMAL

## 2022-06-28 LAB
SHBG SERPL-SCNC: 131 NMOL/L (ref 17–125)
TESTOST FREE SERPL-MCNC: 1.4 PG/ML (ref 0.6–3.8)
TESTOST SERPL-MCNC: 22 NG/DL (ref 9–55)

## 2022-11-01 ENCOUNTER — APPOINTMENT (OUTPATIENT)
Dept: RADIOLOGY | Facility: MEDICAL CENTER | Age: 52
End: 2022-11-01
Attending: INTERNAL MEDICINE
Payer: COMMERCIAL

## 2022-12-29 ENCOUNTER — HOSPITAL ENCOUNTER (OUTPATIENT)
Dept: RADIOLOGY | Facility: MEDICAL CENTER | Age: 52
End: 2022-12-29
Attending: INTERNAL MEDICINE
Payer: COMMERCIAL

## 2022-12-29 DIAGNOSIS — C50.812 MALIGNANT NEOPLASM OF OVERLAPPING SITES OF LEFT FEMALE BREAST, UNSPECIFIED ESTROGEN RECEPTOR STATUS (HCC): ICD-10-CM

## 2022-12-29 DIAGNOSIS — D64.9 ANEMIA, UNSPECIFIED TYPE: ICD-10-CM

## 2022-12-29 PROCEDURE — G0279 TOMOSYNTHESIS, MAMMO: HCPCS

## 2023-01-11 ENCOUNTER — HOSPITAL ENCOUNTER (OUTPATIENT)
Facility: MEDICAL CENTER | Age: 53
End: 2023-01-11
Attending: INTERNAL MEDICINE
Payer: COMMERCIAL

## 2023-01-11 LAB
25(OH)D3 SERPL-MCNC: 56 NG/ML (ref 30–100)
ALBUMIN SERPL BCP-MCNC: 4.6 G/DL (ref 3.2–4.9)
ALBUMIN/GLOB SERPL: 1.9 G/DL
ALP SERPL-CCNC: 71 U/L (ref 30–99)
ALT SERPL-CCNC: 16 U/L (ref 2–50)
AMBIGUOUS DTTM AMBI4: NORMAL
ANION GAP SERPL CALC-SCNC: 15 MMOL/L (ref 7–16)
AST SERPL-CCNC: 18 U/L (ref 12–45)
BILIRUB SERPL-MCNC: 0.7 MG/DL (ref 0.1–1.5)
BUN SERPL-MCNC: 19 MG/DL (ref 8–22)
CALCIUM ALBUM COR SERPL-MCNC: 9.5 MG/DL (ref 8.5–10.5)
CALCIUM SERPL-MCNC: 10 MG/DL (ref 8.5–10.5)
CHLORIDE SERPL-SCNC: 103 MMOL/L (ref 96–112)
CO2 SERPL-SCNC: 23 MMOL/L (ref 20–33)
CREAT SERPL-MCNC: 0.83 MG/DL (ref 0.5–1.4)
ESTRADIOL SERPL-MCNC: 16.3 PG/ML
FSH SERPL-ACNC: 32.2 MIU/ML
GFR SERPLBLD CREATININE-BSD FMLA CKD-EPI: 83 ML/MIN/1.73 M 2
GLOBULIN SER CALC-MCNC: 2.4 G/DL (ref 1.9–3.5)
GLUCOSE SERPL-MCNC: 95 MG/DL (ref 65–99)
POTASSIUM SERPL-SCNC: 4.2 MMOL/L (ref 3.6–5.5)
PROT SERPL-MCNC: 7 G/DL (ref 6–8.2)
SODIUM SERPL-SCNC: 141 MMOL/L (ref 135–145)

## 2023-01-11 PROCEDURE — 82306 VITAMIN D 25 HYDROXY: CPT

## 2023-01-11 PROCEDURE — 83001 ASSAY OF GONADOTROPIN (FSH): CPT

## 2023-01-11 PROCEDURE — 80053 COMPREHEN METABOLIC PANEL: CPT

## 2023-01-11 PROCEDURE — 82670 ASSAY OF TOTAL ESTRADIOL: CPT

## 2023-03-27 NOTE — TELEPHONE ENCOUNTER
Received request via: Pharmacy    Was the patient seen in the last year in this department? Yes    Does the patient have an active prescription (recently filled or refills available) for medication(s) requested? No    Does the patient have FDC Plus and need 100 day supply (blood pressure, diabetes and cholesterol meds only)? Patient does not have SCP      Last office Visit:05/05/2022  Last Labs:01/11/2023

## 2023-03-28 RX ORDER — GABAPENTIN 600 MG/1
600 TABLET ORAL 2 TIMES DAILY
Qty: 180 TABLET | Refills: 3 | Status: SHIPPED | OUTPATIENT
Start: 2023-03-28

## 2023-05-26 ENCOUNTER — HOSPITAL ENCOUNTER (OUTPATIENT)
Dept: LAB | Facility: MEDICAL CENTER | Age: 53
End: 2023-05-26
Attending: PSYCHIATRY & NEUROLOGY
Payer: COMMERCIAL

## 2023-05-26 LAB
ALBUMIN SERPL BCP-MCNC: 4.2 G/DL (ref 3.2–4.9)
ALBUMIN/GLOB SERPL: 1.6 G/DL
ALP SERPL-CCNC: 79 U/L (ref 30–99)
ALT SERPL-CCNC: 32 U/L (ref 2–50)
ANION GAP SERPL CALC-SCNC: 12 MMOL/L (ref 7–16)
AST SERPL-CCNC: 26 U/L (ref 12–45)
BASOPHILS # BLD AUTO: 1 % (ref 0–1.8)
BASOPHILS # BLD: 0.05 K/UL (ref 0–0.12)
BILIRUB SERPL-MCNC: 0.5 MG/DL (ref 0.1–1.5)
BUN SERPL-MCNC: 15 MG/DL (ref 8–22)
CALCIUM ALBUM COR SERPL-MCNC: 9.2 MG/DL (ref 8.5–10.5)
CALCIUM SERPL-MCNC: 9.4 MG/DL (ref 8.5–10.5)
CHLORIDE SERPL-SCNC: 104 MMOL/L (ref 96–112)
CHOLEST SERPL-MCNC: 213 MG/DL (ref 100–199)
CO2 SERPL-SCNC: 24 MMOL/L (ref 20–33)
CREAT SERPL-MCNC: 0.83 MG/DL (ref 0.5–1.4)
EOSINOPHIL # BLD AUTO: 0.13 K/UL (ref 0–0.51)
EOSINOPHIL NFR BLD: 2.7 % (ref 0–6.9)
ERYTHROCYTE [DISTWIDTH] IN BLOOD BY AUTOMATED COUNT: 48.6 FL (ref 35.9–50)
FASTING STATUS PATIENT QL REPORTED: NORMAL
GFR SERPLBLD CREATININE-BSD FMLA CKD-EPI: 83 ML/MIN/1.73 M 2
GLOBULIN SER CALC-MCNC: 2.7 G/DL (ref 1.9–3.5)
GLUCOSE SERPL-MCNC: 109 MG/DL (ref 65–99)
HCT VFR BLD AUTO: 43.8 % (ref 37–47)
HDLC SERPL-MCNC: 102 MG/DL
HGB BLD-MCNC: 14 G/DL (ref 12–16)
IMM GRANULOCYTES # BLD AUTO: 0.02 K/UL (ref 0–0.11)
IMM GRANULOCYTES NFR BLD AUTO: 0.4 % (ref 0–0.9)
LDLC SERPL CALC-MCNC: 91 MG/DL
LYMPHOCYTES # BLD AUTO: 1.65 K/UL (ref 1–4.8)
LYMPHOCYTES NFR BLD: 33.7 % (ref 22–41)
MCH RBC QN AUTO: 29.4 PG (ref 27–33)
MCHC RBC AUTO-ENTMCNC: 32 G/DL (ref 32.2–35.5)
MCV RBC AUTO: 91.8 FL (ref 81.4–97.8)
MONOCYTES # BLD AUTO: 0.31 K/UL (ref 0–0.85)
MONOCYTES NFR BLD AUTO: 6.3 % (ref 0–13.4)
NEUTROPHILS # BLD AUTO: 2.74 K/UL (ref 1.82–7.42)
NEUTROPHILS NFR BLD: 55.9 % (ref 44–72)
NRBC # BLD AUTO: 0 K/UL
NRBC BLD-RTO: 0 /100 WBC (ref 0–0.2)
PLATELET # BLD AUTO: 157 K/UL (ref 164–446)
PMV BLD AUTO: 11.9 FL (ref 9–12.9)
POTASSIUM SERPL-SCNC: 4.3 MMOL/L (ref 3.6–5.5)
PROT SERPL-MCNC: 6.9 G/DL (ref 6–8.2)
RBC # BLD AUTO: 4.77 M/UL (ref 4.2–5.4)
SODIUM SERPL-SCNC: 140 MMOL/L (ref 135–145)
TRIGL SERPL-MCNC: 100 MG/DL (ref 0–149)
WBC # BLD AUTO: 4.9 K/UL (ref 4.8–10.8)

## 2023-05-26 PROCEDURE — 84439 ASSAY OF FREE THYROXINE: CPT

## 2023-05-26 PROCEDURE — 84402 ASSAY OF FREE TESTOSTERONE: CPT

## 2023-05-26 PROCEDURE — 82306 VITAMIN D 25 HYDROXY: CPT

## 2023-05-26 PROCEDURE — 80053 COMPREHEN METABOLIC PANEL: CPT

## 2023-05-26 PROCEDURE — 36415 COLL VENOUS BLD VENIPUNCTURE: CPT

## 2023-05-26 PROCEDURE — 84481 FREE ASSAY (FT-3): CPT

## 2023-05-26 PROCEDURE — 82746 ASSAY OF FOLIC ACID SERUM: CPT

## 2023-05-26 PROCEDURE — 80307 DRUG TEST PRSMV CHEM ANLYZR: CPT

## 2023-05-26 PROCEDURE — 86376 MICROSOMAL ANTIBODY EACH: CPT

## 2023-05-26 PROCEDURE — 80061 LIPID PANEL: CPT

## 2023-05-26 PROCEDURE — 84443 ASSAY THYROID STIM HORMONE: CPT

## 2023-05-26 PROCEDURE — G0480 DRUG TEST DEF 1-7 CLASSES: HCPCS

## 2023-05-26 PROCEDURE — 83036 HEMOGLOBIN GLYCOSYLATED A1C: CPT

## 2023-05-26 PROCEDURE — 84270 ASSAY OF SEX HORMONE GLOBUL: CPT

## 2023-05-26 PROCEDURE — 85025 COMPLETE CBC W/AUTO DIFF WBC: CPT

## 2023-05-26 PROCEDURE — 86800 THYROGLOBULIN ANTIBODY: CPT

## 2023-05-26 PROCEDURE — 82607 VITAMIN B-12: CPT

## 2023-05-26 PROCEDURE — 84403 ASSAY OF TOTAL TESTOSTERONE: CPT

## 2023-05-27 LAB
25(OH)D3 SERPL-MCNC: 63 NG/ML (ref 30–100)
EST. AVERAGE GLUCOSE BLD GHB EST-MCNC: 108 MG/DL
FOLATE SERPL-MCNC: 2.6 NG/ML
HBA1C MFR BLD: 5.4 % (ref 4–5.6)
T3FREE SERPL-MCNC: 3.39 PG/ML (ref 2–4.4)
T4 FREE SERPL-MCNC: 1.12 NG/DL (ref 0.93–1.7)
THYROPEROXIDASE AB SERPL-ACNC: <9 IU/ML (ref 0–9)
TSH SERPL DL<=0.005 MIU/L-ACNC: 2.08 UIU/ML (ref 0.38–5.33)
VIT B12 SERPL-MCNC: 1450 PG/ML (ref 211–911)

## 2023-05-30 LAB
AMPHET CTO UR CFM-MCNC: POSITIVE NG/ML
BARBITURATES CTO UR CFM-MCNC: NEGATIVE NG/ML
BENZODIAZ CTO UR CFM-MCNC: NEGATIVE NG/ML
CANNABINOIDS CTO UR CFM-MCNC: POSITIVE NG/ML
COCAINE CTO UR CFM-MCNC: NEGATIVE NG/ML
DRUG COMMENT 753798: NORMAL
METHADONE CTO UR CFM-MCNC: NEGATIVE NG/ML
OPIATES CTO UR CFM-MCNC: NEGATIVE NG/ML
PCP CTO UR CFM-MCNC: NEGATIVE NG/ML
PROPOXYPH CTO UR CFM-MCNC: NEGATIVE NG/ML
THYROGLOB AB SERPL-ACNC: <0.9 IU/ML (ref 0–4)

## 2023-05-31 LAB
AMPHET UR CFM-MCNC: 3906 NG/ML
MDA UR CFM-MCNC: <200 NG/ML
MDEA UR CFM-MCNC: <200 NG/ML
MDMA UR CFM-MCNC: <200 NG/ML
METHAMPHET UR CFM-MCNC: <200 NG/ML
PHENTERMINE UR CFM-MCNC: <200 NG/ML
THC UR CFM-MCNC: >500 NG/ML

## 2023-06-01 LAB
SHBG SERPL-SCNC: 95 NMOL/L (ref 17–125)
TESTOST FREE SERPL-MCNC: 0.8 PG/ML (ref 0.6–3.8)
TESTOST SERPL-MCNC: 10 NG/DL (ref 9–55)

## 2023-08-21 ENCOUNTER — HOSPITAL ENCOUNTER (OUTPATIENT)
Dept: LAB | Facility: MEDICAL CENTER | Age: 53
End: 2023-08-21
Attending: NURSE PRACTITIONER
Payer: COMMERCIAL

## 2023-08-21 LAB
25(OH)D3 SERPL-MCNC: 58 NG/ML (ref 30–100)
ALBUMIN SERPL BCP-MCNC: 4.4 G/DL (ref 3.2–4.9)
ALBUMIN/GLOB SERPL: 1.7 G/DL
ALP SERPL-CCNC: 88 U/L (ref 30–99)
ALT SERPL-CCNC: 44 U/L (ref 2–50)
ANION GAP SERPL CALC-SCNC: 10 MMOL/L (ref 7–16)
AST SERPL-CCNC: 35 U/L (ref 12–45)
BASOPHILS # BLD AUTO: 0.9 % (ref 0–1.8)
BASOPHILS # BLD: 0.07 K/UL (ref 0–0.12)
BILIRUB SERPL-MCNC: 0.3 MG/DL (ref 0.1–1.5)
BUN SERPL-MCNC: 18 MG/DL (ref 8–22)
CALCIUM ALBUM COR SERPL-MCNC: 9.3 MG/DL (ref 8.5–10.5)
CALCIUM SERPL-MCNC: 9.6 MG/DL (ref 8.5–10.5)
CHLORIDE SERPL-SCNC: 106 MMOL/L (ref 96–112)
CHOLEST SERPL-MCNC: 206 MG/DL (ref 100–199)
CO2 SERPL-SCNC: 25 MMOL/L (ref 20–33)
CREAT SERPL-MCNC: 0.74 MG/DL (ref 0.5–1.4)
EOSINOPHIL # BLD AUTO: 0.14 K/UL (ref 0–0.51)
EOSINOPHIL NFR BLD: 1.8 % (ref 0–6.9)
ERYTHROCYTE [DISTWIDTH] IN BLOOD BY AUTOMATED COUNT: 50.4 FL (ref 35.9–50)
EST. AVERAGE GLUCOSE BLD GHB EST-MCNC: 105 MG/DL
FASTING STATUS PATIENT QL REPORTED: NORMAL
FOLATE SERPL-MCNC: 26.8 NG/ML
GFR SERPLBLD CREATININE-BSD FMLA CKD-EPI: 95 ML/MIN/1.73 M 2
GLOBULIN SER CALC-MCNC: 2.6 G/DL (ref 1.9–3.5)
GLUCOSE SERPL-MCNC: 95 MG/DL (ref 65–99)
HBA1C MFR BLD: 5.3 % (ref 4–5.6)
HCT VFR BLD AUTO: 44.5 % (ref 37–47)
HDLC SERPL-MCNC: 139 MG/DL
HGB BLD-MCNC: 14.1 G/DL (ref 12–16)
IMM GRANULOCYTES # BLD AUTO: 0.05 K/UL (ref 0–0.11)
IMM GRANULOCYTES NFR BLD AUTO: 0.7 % (ref 0–0.9)
LDLC SERPL CALC-MCNC: 55 MG/DL
LYMPHOCYTES # BLD AUTO: 2.75 K/UL (ref 1–4.8)
LYMPHOCYTES NFR BLD: 35.9 % (ref 22–41)
MCH RBC QN AUTO: 29 PG (ref 27–33)
MCHC RBC AUTO-ENTMCNC: 31.7 G/DL (ref 32.2–35.5)
MCV RBC AUTO: 91.4 FL (ref 81.4–97.8)
MONOCYTES # BLD AUTO: 0.43 K/UL (ref 0–0.85)
MONOCYTES NFR BLD AUTO: 5.6 % (ref 0–13.4)
NEUTROPHILS # BLD AUTO: 4.21 K/UL (ref 1.82–7.42)
NEUTROPHILS NFR BLD: 55.1 % (ref 44–72)
NRBC # BLD AUTO: 0 K/UL
NRBC BLD-RTO: 0 /100 WBC (ref 0–0.2)
PLATELET # BLD AUTO: 186 K/UL (ref 164–446)
PMV BLD AUTO: 11.8 FL (ref 9–12.9)
POTASSIUM SERPL-SCNC: 4.1 MMOL/L (ref 3.6–5.5)
PROT SERPL-MCNC: 7 G/DL (ref 6–8.2)
RBC # BLD AUTO: 4.87 M/UL (ref 4.2–5.4)
SODIUM SERPL-SCNC: 141 MMOL/L (ref 135–145)
T4 FREE SERPL-MCNC: 0.97 NG/DL (ref 0.93–1.7)
TRIGL SERPL-MCNC: 62 MG/DL (ref 0–149)
TSH SERPL DL<=0.005 MIU/L-ACNC: 1.58 UIU/ML (ref 0.38–5.33)
VIT B12 SERPL-MCNC: 879 PG/ML (ref 211–911)
WBC # BLD AUTO: 7.7 K/UL (ref 4.8–10.8)

## 2023-08-21 PROCEDURE — 84439 ASSAY OF FREE THYROXINE: CPT

## 2023-08-21 PROCEDURE — 36415 COLL VENOUS BLD VENIPUNCTURE: CPT

## 2023-08-21 PROCEDURE — 82306 VITAMIN D 25 HYDROXY: CPT

## 2023-08-21 PROCEDURE — 84443 ASSAY THYROID STIM HORMONE: CPT

## 2023-08-21 PROCEDURE — 82746 ASSAY OF FOLIC ACID SERUM: CPT

## 2023-08-21 PROCEDURE — 82607 VITAMIN B-12: CPT

## 2023-08-21 PROCEDURE — 83013 H PYLORI (C-13) BREATH: CPT

## 2023-08-21 PROCEDURE — 85025 COMPLETE CBC W/AUTO DIFF WBC: CPT

## 2023-08-21 PROCEDURE — 83036 HEMOGLOBIN GLYCOSYLATED A1C: CPT

## 2023-08-21 PROCEDURE — 80053 COMPREHEN METABOLIC PANEL: CPT

## 2023-08-21 PROCEDURE — 80061 LIPID PANEL: CPT

## 2023-08-23 LAB — UREA BREATH TEST QL: NEGATIVE

## 2024-01-02 ENCOUNTER — HOSPITAL ENCOUNTER (OUTPATIENT)
Dept: RADIOLOGY | Facility: MEDICAL CENTER | Age: 54
End: 2024-01-02
Attending: INTERNAL MEDICINE
Payer: COMMERCIAL

## 2024-01-02 DIAGNOSIS — Z12.31 VISIT FOR SCREENING MAMMOGRAM: ICD-10-CM

## 2024-01-02 PROCEDURE — 77067 SCR MAMMO BI INCL CAD: CPT

## 2024-01-23 ENCOUNTER — HOSPITAL ENCOUNTER (OUTPATIENT)
Facility: MEDICAL CENTER | Age: 54
End: 2024-01-23
Attending: INTERNAL MEDICINE
Payer: COMMERCIAL

## 2024-01-23 PROCEDURE — 82306 VITAMIN D 25 HYDROXY: CPT

## 2024-01-24 LAB — 25(OH)D3 SERPL-MCNC: 52 NG/ML (ref 30–100)

## 2024-02-14 ENCOUNTER — HOSPITAL ENCOUNTER (OUTPATIENT)
Dept: RADIOLOGY | Facility: MEDICAL CENTER | Age: 54
End: 2024-02-14
Attending: INTERNAL MEDICINE
Payer: COMMERCIAL

## 2024-04-03 ENCOUNTER — HOSPITAL ENCOUNTER (OUTPATIENT)
Dept: RADIOLOGY | Facility: MEDICAL CENTER | Age: 54
End: 2024-04-03
Attending: INTERNAL MEDICINE
Payer: COMMERCIAL

## 2024-04-03 DIAGNOSIS — M81.0 SENILE OSTEOPOROSIS: ICD-10-CM

## 2024-04-03 PROCEDURE — 77080 DXA BONE DENSITY AXIAL: CPT

## 2025-02-28 NOTE — TELEPHONE ENCOUNTER
Was the patient seen in the last year in this department? Yes     Does patient have an active prescription for medications requested? Yes     Received Request Via: Pharmacy   n/a

## 2025-03-27 ENCOUNTER — APPOINTMENT (OUTPATIENT)
Dept: RADIOLOGY | Facility: MEDICAL CENTER | Age: 55
End: 2025-03-27
Attending: INTERNAL MEDICINE
Payer: COMMERCIAL

## 2025-04-09 ENCOUNTER — HOSPITAL ENCOUNTER (OUTPATIENT)
Dept: RADIOLOGY | Facility: MEDICAL CENTER | Age: 55
End: 2025-04-09
Attending: INTERNAL MEDICINE
Payer: COMMERCIAL

## 2025-04-09 DIAGNOSIS — Z12.31 VISIT FOR SCREENING MAMMOGRAM: ICD-10-CM

## 2025-04-09 PROCEDURE — 77067 SCR MAMMO BI INCL CAD: CPT

## 2025-04-22 ENCOUNTER — HOSPITAL ENCOUNTER (OUTPATIENT)
Dept: LAB | Facility: MEDICAL CENTER | Age: 55
End: 2025-04-22
Attending: PSYCHIATRY & NEUROLOGY
Payer: COMMERCIAL

## 2025-04-22 LAB
BASOPHILS # BLD AUTO: 0.8 % (ref 0–1.8)
BASOPHILS # BLD: 0.06 K/UL (ref 0–0.12)
EOSINOPHIL # BLD AUTO: 0.17 K/UL (ref 0–0.51)
EOSINOPHIL NFR BLD: 2.3 % (ref 0–6.9)
ERYTHROCYTE [DISTWIDTH] IN BLOOD BY AUTOMATED COUNT: 49.1 FL (ref 35.9–50)
EST. AVERAGE GLUCOSE BLD GHB EST-MCNC: 123 MG/DL
FOLATE SERPL-MCNC: 7.2 NG/ML
HBA1C MFR BLD: 5.9 % (ref 4–5.6)
HCT VFR BLD AUTO: 43.3 % (ref 37–47)
HGB BLD-MCNC: 13.8 G/DL (ref 12–16)
IMM GRANULOCYTES # BLD AUTO: 0.04 K/UL (ref 0–0.11)
IMM GRANULOCYTES NFR BLD AUTO: 0.5 % (ref 0–0.9)
LYMPHOCYTES # BLD AUTO: 2.76 K/UL (ref 1–4.8)
LYMPHOCYTES NFR BLD: 37.5 % (ref 22–41)
MCH RBC QN AUTO: 29.6 PG (ref 27–33)
MCHC RBC AUTO-ENTMCNC: 31.9 G/DL (ref 32.2–35.5)
MCV RBC AUTO: 92.7 FL (ref 81.4–97.8)
MONOCYTES # BLD AUTO: 0.35 K/UL (ref 0–0.85)
MONOCYTES NFR BLD AUTO: 4.8 % (ref 0–13.4)
NEUTROPHILS # BLD AUTO: 3.98 K/UL (ref 1.82–7.42)
NEUTROPHILS NFR BLD: 54.1 % (ref 44–72)
NRBC # BLD AUTO: 0 K/UL
NRBC BLD-RTO: 0 /100 WBC (ref 0–0.2)
PLATELET # BLD AUTO: 219 K/UL (ref 164–446)
PMV BLD AUTO: 11.6 FL (ref 9–12.9)
RBC # BLD AUTO: 4.67 M/UL (ref 4.2–5.4)
WBC # BLD AUTO: 7.4 K/UL (ref 4.8–10.8)

## 2025-04-22 PROCEDURE — 86038 ANTINUCLEAR ANTIBODIES: CPT

## 2025-04-22 PROCEDURE — 82607 VITAMIN B-12: CPT

## 2025-04-22 PROCEDURE — 85025 COMPLETE CBC W/AUTO DIFF WBC: CPT

## 2025-04-22 PROCEDURE — 86800 THYROGLOBULIN ANTIBODY: CPT

## 2025-04-22 PROCEDURE — 84439 ASSAY OF FREE THYROXINE: CPT

## 2025-04-22 PROCEDURE — 84403 ASSAY OF TOTAL TESTOSTERONE: CPT

## 2025-04-22 PROCEDURE — 84270 ASSAY OF SEX HORMONE GLOBUL: CPT

## 2025-04-22 PROCEDURE — 82746 ASSAY OF FOLIC ACID SERUM: CPT

## 2025-04-22 PROCEDURE — 83036 HEMOGLOBIN GLYCOSYLATED A1C: CPT

## 2025-04-22 PROCEDURE — 80061 LIPID PANEL: CPT

## 2025-04-22 PROCEDURE — 36415 COLL VENOUS BLD VENIPUNCTURE: CPT

## 2025-04-22 PROCEDURE — 82306 VITAMIN D 25 HYDROXY: CPT

## 2025-04-22 PROCEDURE — 84402 ASSAY OF FREE TESTOSTERONE: CPT

## 2025-04-22 PROCEDURE — 84481 FREE ASSAY (FT-3): CPT

## 2025-04-22 PROCEDURE — 86376 MICROSOMAL ANTIBODY EACH: CPT

## 2025-04-22 PROCEDURE — 84443 ASSAY THYROID STIM HORMONE: CPT

## 2025-04-22 PROCEDURE — 80053 COMPREHEN METABOLIC PANEL: CPT

## 2025-04-23 LAB
25(OH)D3 SERPL-MCNC: 49 NG/ML (ref 30–100)
ALBUMIN SERPL BCP-MCNC: 4.3 G/DL (ref 3.2–4.9)
ALBUMIN/GLOB SERPL: 1.5 G/DL
ALP SERPL-CCNC: 74 U/L (ref 30–99)
ALT SERPL-CCNC: 17 U/L (ref 2–50)
ANION GAP SERPL CALC-SCNC: 13 MMOL/L (ref 7–16)
AST SERPL-CCNC: 21 U/L (ref 12–45)
BILIRUB SERPL-MCNC: 0.5 MG/DL (ref 0.1–1.5)
BUN SERPL-MCNC: 14 MG/DL (ref 8–22)
CALCIUM ALBUM COR SERPL-MCNC: 9.6 MG/DL (ref 8.5–10.5)
CALCIUM SERPL-MCNC: 9.8 MG/DL (ref 8.5–10.5)
CHLORIDE SERPL-SCNC: 105 MMOL/L (ref 96–112)
CHOLEST SERPL-MCNC: 227 MG/DL (ref 100–199)
CO2 SERPL-SCNC: 25 MMOL/L (ref 20–33)
CREAT SERPL-MCNC: 0.92 MG/DL (ref 0.5–1.4)
FASTING STATUS PATIENT QL REPORTED: NORMAL
GFR SERPLBLD CREATININE-BSD FMLA CKD-EPI: 73 ML/MIN/1.73 M 2
GLOBULIN SER CALC-MCNC: 2.9 G/DL (ref 1.9–3.5)
GLUCOSE SERPL-MCNC: 106 MG/DL (ref 65–99)
HDLC SERPL-MCNC: 105 MG/DL
LDLC SERPL CALC-MCNC: 95 MG/DL
POTASSIUM SERPL-SCNC: 3.9 MMOL/L (ref 3.6–5.5)
PROT SERPL-MCNC: 7.2 G/DL (ref 6–8.2)
SODIUM SERPL-SCNC: 143 MMOL/L (ref 135–145)
T3FREE SERPL-MCNC: 3.05 PG/ML (ref 2–4.4)
T4 FREE SERPL-MCNC: 0.87 NG/DL (ref 0.93–1.7)
THYROPEROXIDASE AB SERPL-ACNC: <9 IU/ML (ref 0–9)
TRIGL SERPL-MCNC: 133 MG/DL (ref 0–149)
TSH SERPL-ACNC: 2.4 UIU/ML (ref 0.38–5.33)
VIT B12 SERPL-MCNC: 743 PG/ML (ref 211–911)

## 2025-04-24 LAB
NUCLEAR IGG SER QL IA: NORMAL
THYROGLOB AB SERPL-ACNC: <1.5 IU/ML (ref 0–4)

## 2025-04-27 LAB
SHBG SERPL-SCNC: 83 NMOL/L (ref 17–125)
TESTOST FREE SERPL-MCNC: 1.6 PG/ML (ref 0.6–3.8)
TESTOST SERPL-MCNC: 18 NG/DL (ref 9–55)

## 2025-08-27 ENCOUNTER — HOSPITAL ENCOUNTER (OUTPATIENT)
Dept: LAB | Facility: MEDICAL CENTER | Age: 55
End: 2025-08-27
Attending: PSYCHIATRY & NEUROLOGY
Payer: COMMERCIAL

## 2025-08-27 LAB
EST. AVERAGE GLUCOSE BLD GHB EST-MCNC: 120 MG/DL
HBA1C MFR BLD: 5.8 % (ref 4–5.6)
T3FREE SERPL-MCNC: 2.96 PG/ML (ref 2–4.4)
T4 FREE SERPL-MCNC: 0.98 NG/DL (ref 0.93–1.7)
TSH SERPL-ACNC: 1.89 UIU/ML (ref 0.38–5.33)

## 2025-08-27 PROCEDURE — 84439 ASSAY OF FREE THYROXINE: CPT

## 2025-08-27 PROCEDURE — 83036 HEMOGLOBIN GLYCOSYLATED A1C: CPT

## 2025-08-27 PROCEDURE — 84481 FREE ASSAY (FT-3): CPT

## 2025-08-27 PROCEDURE — 84443 ASSAY THYROID STIM HORMONE: CPT

## 2025-08-27 PROCEDURE — 36415 COLL VENOUS BLD VENIPUNCTURE: CPT

## (undated) DEVICE — GLOVE BIOGEL PI INDICATOR SZ 8.0 SURGICAL PF LF -(50/BX 4BX/CA)

## (undated) DEVICE — CLIP APPLIER OPEN SMALL (6EA/BX)

## (undated) DEVICE — GLOVE BIOGEL SZ 6 PF LATEX - (50EA/BX 4BX/CA)

## (undated) DEVICE — HEAD HOLDER JUNIOR/ADULT

## (undated) DEVICE — SHEAR HS FOCUS 9CM CVD - (6/BX)

## (undated) DEVICE — ELECTRODE 850 FOAM ADHESIVE - HYDROGEL RADIOTRNSPRNT (50/PK)

## (undated) DEVICE — MASK ANESTHESIA ADULT  - (100/CA)

## (undated) DEVICE — BLADE SURGICAL #11 - (50/BX)

## (undated) DEVICE — COVER PROBE STERILE CONE (12EA/CA)

## (undated) DEVICE — SUCTION INSTRUMENT YANKAUER BULBOUS TIP W/O VENT (50EA/CA)

## (undated) DEVICE — GLOVE BIOGEL INDICATOR SZ 6.5 SURGICAL PF LTX - (50PR/BX 4BX/CA)

## (undated) DEVICE — CLOSURE SKIN STRIP 1/2 X 4 IN - (STERI STRIP) (50/BX 4BX/CA)

## (undated) DEVICE — PROTECTOR ULNA NERVE - (36PR/CA)

## (undated) DEVICE — CHLORAPREP 26 ML APPLICATOR - ORANGE TINT(25/CA)

## (undated) DEVICE — COVER PROBE INTRAOPERATIVE KIT (10EA/CA)

## (undated) DEVICE — LACTATED RINGERS INJ 1000 ML - (14EA/CA 60CA/PF)

## (undated) DEVICE — CATHETER IV 20 GA X 1-1/4 ---SURG.& SDS ONLY--- (50EA/BX)

## (undated) DEVICE — RESERVOIR SUCTION 100 CC - SILICONE (20EA/CA)

## (undated) DEVICE — GOWN WARMING STANDARD FLEX - (30/CA)

## (undated) DEVICE — TUBING CLEARLINK DUO-VENT - C-FLO (48EA/CA)

## (undated) DEVICE — SUTURE 2-0 VICRYL PLUS SH - 8 X 18 INCH (12/BX)

## (undated) DEVICE — GLOVE BIOGEL PI INDICATOR SZ 7.0 SURGICAL PF LF - (50/BX 4BX/CA)

## (undated) DEVICE — SENSOR SPO2 NEO LNCS ADHESIVE (20/BX) SEE USER NOTES

## (undated) DEVICE — KIT ANESTHESIA W/CIRCUIT & 3/LT BAG W/FILTER (20EA/CA)

## (undated) DEVICE — GLOVE BIOGEL SZ 8 SURGICAL PF LTX - (50PR/BX 4BX/CA)

## (undated) DEVICE — SLEEVE, VASO, THIGH, MED

## (undated) DEVICE — CANISTER SUCTION 3000ML MECHANICAL FILTER AUTO SHUTOFF MEDI-VAC NONSTERILE LF DISP  (40EA/CA)

## (undated) DEVICE — COVER CIV-FLEX TRANSDUCER - (24/BX)

## (undated) DEVICE — KIT  I.V. START (100EA/CA)

## (undated) DEVICE — DRAPE MAGNETIC (INSTRA-MAG) - (30/CA)

## (undated) DEVICE — SODIUM CHL IRRIGATION 0.9% 1000ML (12EA/CA)

## (undated) DEVICE — TOWELS CLOTH SURGICAL - (4/PK 20PK/CA)

## (undated) DEVICE — SUTURE GENERAL

## (undated) DEVICE — GLOVE SZ 7.5 BIOGEL PI MICRO - PF LF (50PR/BX)

## (undated) DEVICE — ELECTRODE DUAL RETURN W/ CORD - (50/PK)

## (undated) DEVICE — BANDAGE ELASTIC STERILE MATRIX 6 X 10 (20EA/CA)

## (undated) DEVICE — SUTURE 5-0 PLAIN GUT PC-1 - (12/BX)

## (undated) DEVICE — SUTURE 3-0 MONOCRYL PLUS PS-2 - (12/BX)

## (undated) DEVICE — DECANTER FLD BLS - (50/CA)

## (undated) DEVICE — PLUMEPEN ULTRA 3/8 IN X 10 FT HOSE (20EA/CA)

## (undated) DEVICE — SUTURE 3-0 MONOCRYL PLUS PS-1 - 27 INCH (36/BX)

## (undated) DEVICE — SET LEADWIRE 5 LEAD BEDSIDE DISPOSABLE ECG (1SET OF 5/EA)

## (undated) DEVICE — BOVIE BLADE COATED &INSULATED (50EA/PK)

## (undated) DEVICE — BLADE ELECTRODE COATED EDGE (50EA/PK)

## (undated) DEVICE — PAD LAP STERILE 18 X 18 - (5/PK 40PK/CA)

## (undated) DEVICE — PACK MINOR BASIN - (2EA/CA)

## (undated) DEVICE — SUTURE 2-0 ETHIBOND GREEN CT-2 TAPER (36PK/BX)

## (undated) DEVICE — CANISTER SUCTION RIGID RED 1500CC (40EA/CA)

## (undated) DEVICE — SHEET TRANSVERSE LAP - (12EA/CA)

## (undated) DEVICE — SUTURE 3-0 VICRYL PLUS SH - 8X 18 INCH (12/BX)

## (undated) DEVICE — DRAIN J-VAC 7MM FLAT - (10EA/CA)

## (undated) DEVICE — SUTURE 4-0 MONOCRYL PLUS PS-2 - 27 INCH (36/BX)

## (undated) DEVICE — SUTURE 3-0 ETHILON FS-1 - (36/BX) 30 INCH

## (undated) DEVICE — DRAPE LARGE 3 QUARTER - (20/CA)

## (undated) DEVICE — GOWN SURGICAL XX-LARGE - (28EA/CA) SIRUS NON REINFORCED

## (undated) DEVICE — CLIP MED INTNL HRZN TI ESCP - (25/BX)

## (undated) DEVICE — GLOVE SZ 6.5 BIOGEL PI MICRO - PF LF (50PR/BX)

## (undated) DEVICE — SET EXTENSION WITH 2 PORTS (48EA/CA) ***PART #2C8610 IS A SUBSTITUTE*****

## (undated) DEVICE — MANIFOLD NEPTUNE 1 PORT (20/PK)

## (undated) DEVICE — DRESSING TRANSPARENT FILM TEGADERM 2.375 X 2.75"  (100EA/BX)"

## (undated) DEVICE — DRAPE C-ARM LARGE 41IN X 74 IN - (10/BX 2BX/CA)

## (undated) DEVICE — TUBE CONNECTING SUCTION - CLEAR PLASTIC STERILE 72 IN (50EA/CA)

## (undated) DEVICE — SPONGE GAUZESTER. 2X2 4-PL - (2/PK 50PK/BX 30BX/CS)

## (undated) DEVICE — NEPTUNE 4 PORT MANIFOLD - (20/PK)

## (undated) DEVICE — SOD. CHL. INJ. 0.9% 250 ML - (36/CA 50CA/PF)

## (undated) DEVICE — DRAPE SURGICAL U 77X120 - (10/CA)

## (undated) DEVICE — MASK, LARYNGEAL AIRWAY #4